# Patient Record
Sex: MALE | Race: WHITE | Employment: UNEMPLOYED | URBAN - METROPOLITAN AREA
[De-identification: names, ages, dates, MRNs, and addresses within clinical notes are randomized per-mention and may not be internally consistent; named-entity substitution may affect disease eponyms.]

---

## 2017-02-13 ENCOUNTER — HOSPITAL ENCOUNTER (EMERGENCY)
Age: 32
Discharge: STILL ADMITTED OR ADMINISTRATIVE | End: 2017-02-13
Attending: EMERGENCY MEDICINE
Payer: MEDICARE

## 2017-02-14 ENCOUNTER — HOSPITAL ENCOUNTER (EMERGENCY)
Age: 32
Discharge: HOME | End: 2017-02-14
Attending: EMERGENCY MEDICINE
Payer: MEDICARE

## 2017-02-14 ENCOUNTER — APPOINTMENT (OUTPATIENT)
Dept: GENERAL RADIOLOGY | Age: 32
End: 2017-02-14
Attending: PHYSICIAN ASSISTANT
Payer: MEDICARE

## 2017-02-14 VITALS
WEIGHT: 143 LBS | OXYGEN SATURATION: 100 % | BODY MASS INDEX: 17.78 KG/M2 | HEIGHT: 75 IN | HEART RATE: 103 BPM | TEMPERATURE: 96.3 F | SYSTOLIC BLOOD PRESSURE: 107 MMHG | RESPIRATION RATE: 16 BRPM | DIASTOLIC BLOOD PRESSURE: 76 MMHG

## 2017-02-14 DIAGNOSIS — R56.9 SEIZURE (HCC): Primary | ICD-10-CM

## 2017-02-14 LAB
ALBUMIN SERPL BCP-MCNC: 3.3 G/DL (ref 3.5–5)
ALBUMIN/GLOB SERPL: 0.7 {RATIO} (ref 1.1–2.2)
ALP SERPL-CCNC: 70 U/L (ref 45–117)
ALT SERPL-CCNC: 34 U/L (ref 12–78)
ANION GAP BLD CALC-SCNC: 12 MMOL/L (ref 5–15)
APPEARANCE UR: CLEAR
AST SERPL W P-5'-P-CCNC: 47 U/L (ref 15–37)
ATRIAL RATE: 93 BPM
BACTERIA URNS QL MICRO: NEGATIVE /HPF
BASOPHILS # BLD AUTO: 0 K/UL (ref 0–0.1)
BASOPHILS # BLD: 0 % (ref 0–1)
BILIRUB SERPL-MCNC: 0.2 MG/DL (ref 0.2–1)
BILIRUB UR QL: NEGATIVE
BUN SERPL-MCNC: 15 MG/DL (ref 6–20)
BUN/CREAT SERPL: 21 (ref 12–20)
CALCIUM SERPL-MCNC: 8.8 MG/DL (ref 8.5–10.1)
CALCULATED P AXIS, ECG09: 41 DEGREES
CALCULATED R AXIS, ECG10: 79 DEGREES
CALCULATED T AXIS, ECG11: 6 DEGREES
CHLORIDE SERPL-SCNC: 101 MMOL/L (ref 97–108)
CO2 SERPL-SCNC: 24 MMOL/L (ref 21–32)
COLOR UR: NORMAL
CREAT SERPL-MCNC: 0.7 MG/DL (ref 0.7–1.3)
DIAGNOSIS, 93000: NORMAL
EOSINOPHIL # BLD: 0.1 K/UL (ref 0–0.4)
EOSINOPHIL NFR BLD: 2 % (ref 0–7)
EPITH CASTS URNS QL MICRO: NORMAL /LPF
ERYTHROCYTE [DISTWIDTH] IN BLOOD BY AUTOMATED COUNT: 15.7 % (ref 11.5–14.5)
GLOBULIN SER CALC-MCNC: 4.9 G/DL (ref 2–4)
GLUCOSE SERPL-MCNC: 92 MG/DL (ref 65–100)
GLUCOSE UR STRIP.AUTO-MCNC: NEGATIVE MG/DL
HCT VFR BLD AUTO: 38.2 % (ref 36.6–50.3)
HGB BLD-MCNC: 12.1 G/DL (ref 12.1–17)
HGB UR QL STRIP: NEGATIVE
HYALINE CASTS URNS QL MICRO: NORMAL /LPF (ref 0–5)
KETONES UR QL STRIP.AUTO: NEGATIVE MG/DL
LEUKOCYTE ESTERASE UR QL STRIP.AUTO: NEGATIVE
LYMPHOCYTES # BLD AUTO: 25 % (ref 12–49)
LYMPHOCYTES # BLD: 1.5 K/UL (ref 0.8–3.5)
MCH RBC QN AUTO: 26.1 PG (ref 26–34)
MCHC RBC AUTO-ENTMCNC: 31.7 G/DL (ref 30–36.5)
MCV RBC AUTO: 82.5 FL (ref 80–99)
MONOCYTES # BLD: 0.6 K/UL (ref 0–1)
MONOCYTES NFR BLD AUTO: 9 % (ref 5–13)
NEUTS SEG # BLD: 3.8 K/UL (ref 1.8–8)
NEUTS SEG NFR BLD AUTO: 64 % (ref 32–75)
NITRITE UR QL STRIP.AUTO: NEGATIVE
P-R INTERVAL, ECG05: 142 MS
PH UR STRIP: 6.5 [PH] (ref 5–8)
PLATELET # BLD AUTO: 244 K/UL (ref 150–400)
POTASSIUM SERPL-SCNC: 4.4 MMOL/L (ref 3.5–5.1)
PROT SERPL-MCNC: 8.2 G/DL (ref 6.4–8.2)
PROT UR STRIP-MCNC: NEGATIVE MG/DL
Q-T INTERVAL, ECG07: 350 MS
QRS DURATION, ECG06: 102 MS
QTC CALCULATION (BEZET), ECG08: 435 MS
RBC # BLD AUTO: 4.63 M/UL (ref 4.1–5.7)
RBC #/AREA URNS HPF: NORMAL /HPF (ref 0–5)
SODIUM SERPL-SCNC: 137 MMOL/L (ref 136–145)
SP GR UR REFRACTOMETRY: 1.02 (ref 1–1.03)
UA: UC IF INDICATED,UAUC: NORMAL
UROBILINOGEN UR QL STRIP.AUTO: 0.2 EU/DL (ref 0.2–1)
VENTRICULAR RATE, ECG03: 93 BPM
WBC # BLD AUTO: 6 K/UL (ref 4.1–11.1)
WBC URNS QL MICRO: NORMAL /HPF (ref 0–4)

## 2017-02-14 PROCEDURE — 93005 ELECTROCARDIOGRAM TRACING: CPT

## 2017-02-14 PROCEDURE — 36415 COLL VENOUS BLD VENIPUNCTURE: CPT | Performed by: PHYSICIAN ASSISTANT

## 2017-02-14 PROCEDURE — 85025 COMPLETE CBC W/AUTO DIFF WBC: CPT | Performed by: PHYSICIAN ASSISTANT

## 2017-02-14 PROCEDURE — 51701 INSERT BLADDER CATHETER: CPT

## 2017-02-14 PROCEDURE — 77030011943

## 2017-02-14 PROCEDURE — 99285 EMERGENCY DEPT VISIT HI MDM: CPT

## 2017-02-14 PROCEDURE — 71010 XR CHEST PORT: CPT

## 2017-02-14 PROCEDURE — 80053 COMPREHEN METABOLIC PANEL: CPT | Performed by: PHYSICIAN ASSISTANT

## 2017-02-14 PROCEDURE — 81001 URINALYSIS AUTO W/SCOPE: CPT | Performed by: PHYSICIAN ASSISTANT

## 2017-02-14 PROCEDURE — 80339 ANTIEPILEPTICS NOS 1-3: CPT | Performed by: PHYSICIAN ASSISTANT

## 2017-02-14 RX ORDER — LORAZEPAM 2 MG/ML
1 INJECTION INTRAMUSCULAR
Status: DISCONTINUED | OUTPATIENT
Start: 2017-02-14 | End: 2017-02-14 | Stop reason: HOSPADM

## 2017-02-14 NOTE — DISCHARGE INSTRUCTIONS

## 2017-02-14 NOTE — ED NOTES
Mother states her son no longer is having the symptoms as prior to his previous seizure prior to arrival.  Provider notified. Order to not give Ativan at this time.

## 2017-02-14 NOTE — ED TRIAGE NOTES
History of TBI. History of seizures. Tonight had 4 minute seizure that is normal than typical seizure with 15 minute postictal phase. Nonverbal at baseline.

## 2017-02-14 NOTE — ED PROVIDER NOTES
HPI Comments: Patient presents by EMS after witnessed seizure. Patient with history of seizures, history of TBI and is non-verbal at baseline. Patient had normal seizure tonight that lasted approximately 4 minutes however was then post-ictal for 15 minutes which is longer then usual. Patient last seizure was 2 months ago. No change in medications. Patient is a 36 y.o. male presenting with seizures. The history is provided by the patient. Seizure    This is a recurrent problem. The problem has not changed since onset. There was 1 seizure. The most recent episode lasted 2 to 5 minutes. Pertinent negatives include no confusion, no headaches, no speech difficulty, no visual disturbance, no neck stiffness, no sore throat, no chest pain, no cough, no vomiting, no diarrhea and no muscle weakness. Characteristics include rhythmic jerking and loss of consciousness. The episode was witnessed. There was no sensation of an aura present. There was return to baseline postseizure. The seizures did not continue in the ED. The seizure(s) had no focality. Possible causes do not include medication or dosage change, sleep deprivation, missed seizure meds, recent illness, change in alcohol use, stress, head injury or missed seizure meds. There has been no fever. He reports no chest pain, no confusion, no visual disturbance, no diarrhea, no vomiting, no headaches, no sore throat, no muscle weakness, no stiff neck, no speech difficulty, and no cough. There were no medications administered prior to arrival.        Past Medical History:   Diagnosis Date    Neurological disorder      traumatic brain injury    Seizures ([address]        History reviewed. No pertinent past surgical history. History reviewed. No pertinent family history. Social History     Social History    Marital status: N/A     Spouse name: N/A    Number of children: N/A    Years of education: N/A     Occupational History    Not on file.      Social History Main Topics    Smoking status: Never Smoker    Smokeless tobacco: Not on file    Alcohol use Not on file    Drug use: No    Sexual activity: Not on file     Other Topics Concern    Not on file     Social History Narrative    No narrative on file         ALLERGIES: Review of patient's allergies indicates no known allergies. Review of Systems   Constitutional: Negative. HENT: Negative. Negative for sore throat. Eyes: Negative. Negative for visual disturbance. Respiratory: Negative. Negative for cough. Cardiovascular: Negative. Negative for chest pain. Gastrointestinal: Negative. Negative for diarrhea and vomiting. Endocrine: Negative. Genitourinary: Negative. Musculoskeletal: Negative. Skin: Negative. Allergic/Immunologic: Negative. Neurological: Positive for seizures and loss of consciousness. Negative for speech difficulty and headaches. Hematological: Negative. Psychiatric/Behavioral: Negative. Negative for confusion. All other systems reviewed and are negative. Vitals:    02/14/17 0004   BP: 113/75   Pulse: 95   Resp: 13   Temp: 96.3 °F (35.7 °C)   SpO2: 100%   Weight: 64.9 kg (143 lb)   Height: 6' 3\" (1.905 m)            Physical Exam   Constitutional: He appears well-developed and well-nourished. HENT:   Head: Normocephalic and atraumatic. Right Ear: External ear normal.   Left Ear: External ear normal.   Mouth/Throat: Oropharynx is clear and moist. No oropharyngeal exudate. Eyes: Conjunctivae and EOM are normal. Pupils are equal, round, and reactive to light. Right eye exhibits no discharge. Left eye exhibits no discharge. No scleral icterus. Neck: Normal range of motion. Neck supple. No tracheal deviation present. No thyromegaly present. Cardiovascular: Normal rate, regular rhythm, normal heart sounds and intact distal pulses. No murmur heard. Pulmonary/Chest: Effort normal and breath sounds normal. No respiratory distress. He has no wheezes. He has no rales. Abdominal: Soft. Bowel sounds are normal. He exhibits no distension. There is no tenderness. There is no rebound and no guarding. Musculoskeletal: Normal range of motion. He exhibits no edema or tenderness. Lymphadenopathy:     He has no cervical adenopathy. Neurological: He is alert. He displays atrophy. No cranial nerve deficit or sensory deficit. He exhibits abnormal muscle tone. He displays no seizure activity. Coordination normal.   Non-verbal at baseline   Skin: Skin is warm. No rash noted. No erythema. Psychiatric: He has a normal mood and affect. His behavior is normal. Judgment and thought content normal.   Nursing note and vitals reviewed. MDM  Number of Diagnoses or Management Options  Seizure [facility]):   Diagnosis management comments: Assesment/Plan- Patient returns to baseline. No acute findings in ED. Discharge with PCP follow up. Return with worsening symptoms.        Amount and/or Complexity of Data Reviewed  Tests in the radiology section of CPT®: ordered and reviewed      ED Course       Procedures
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
## 2017-02-14 NOTE — ED TRIAGE NOTES
Patient brought in after seizure tonight that was longer than normal seizures. Postictal states 15 minutes per EMS. Nonverbal at baseline but can otherwise communicate. Mother straight caths patient at night; was not incontinent of urine. Accucheck 95.

## 2017-02-15 LAB — LACOSAMIDE SERPL-MCNC: 6.8 UG/ML (ref 5–10)

## 2017-02-23 LAB
GLUCOSE BLD STRIP.AUTO-MCNC: 95 MG/DL (ref 65–100)
SERVICE CMNT-IMP: NORMAL

## 2017-02-24 LAB
GLUCOSE BLD STRIP.AUTO-MCNC: NORMAL MG/DL (ref 65–100)
SERVICE CMNT-IMP: NORMAL

## 2018-05-01 ENCOUNTER — APPOINTMENT (OUTPATIENT)
Dept: GENERAL RADIOLOGY | Age: 33
End: 2018-05-01
Attending: NURSE PRACTITIONER
Payer: MEDICARE

## 2018-05-01 ENCOUNTER — HOSPITAL ENCOUNTER (EMERGENCY)
Age: 33
Discharge: HOME | End: 2018-05-01
Attending: EMERGENCY MEDICINE
Payer: MEDICARE

## 2018-05-01 VITALS
BODY MASS INDEX: 18.03 KG/M2 | HEART RATE: 114 BPM | WEIGHT: 145 LBS | HEIGHT: 75 IN | RESPIRATION RATE: 18 BRPM | TEMPERATURE: 98.3 F | SYSTOLIC BLOOD PRESSURE: 106 MMHG | OXYGEN SATURATION: 95 % | DIASTOLIC BLOOD PRESSURE: 79 MMHG

## 2018-05-01 VITALS
OXYGEN SATURATION: 91 % | HEART RATE: 108 BPM | BODY MASS INDEX: 17.78 KG/M2 | RESPIRATION RATE: 15 BRPM | TEMPERATURE: 98.6 F | WEIGHT: 143 LBS | SYSTOLIC BLOOD PRESSURE: 117 MMHG | DIASTOLIC BLOOD PRESSURE: 66 MMHG | HEIGHT: 75 IN

## 2018-05-01 DIAGNOSIS — R56.9 SEIZURES (HCC): Primary | ICD-10-CM

## 2018-05-01 DIAGNOSIS — R56.9 SEIZURE (HCC): Primary | ICD-10-CM

## 2018-05-01 LAB
ALBUMIN SERPL-MCNC: 3.7 G/DL (ref 3.5–5)
ALBUMIN SERPL-MCNC: 3.8 G/DL (ref 3.5–5)
ALBUMIN/GLOB SERPL: 0.8 {RATIO} (ref 1.1–2.2)
ALBUMIN/GLOB SERPL: 0.8 {RATIO} (ref 1.1–2.2)
ALP SERPL-CCNC: 64 U/L (ref 45–117)
ALP SERPL-CCNC: 67 U/L (ref 45–117)
ALT SERPL-CCNC: 30 U/L (ref 12–78)
ALT SERPL-CCNC: 32 U/L (ref 12–78)
ANION GAP SERPL CALC-SCNC: 10 MMOL/L (ref 5–15)
ANION GAP SERPL CALC-SCNC: 13 MMOL/L (ref 5–15)
APPEARANCE UR: ABNORMAL
AST SERPL-CCNC: 23 U/L (ref 15–37)
AST SERPL-CCNC: 33 U/L (ref 15–37)
ATRIAL RATE: 100 BPM
BACTERIA URNS QL MICRO: NEGATIVE /HPF
BASOPHILS # BLD: 0 K/UL (ref 0–0.1)
BASOPHILS # BLD: 0 K/UL (ref 0–0.1)
BASOPHILS NFR BLD: 0 % (ref 0–1)
BASOPHILS NFR BLD: 0 % (ref 0–1)
BILIRUB SERPL-MCNC: 0.2 MG/DL (ref 0.2–1)
BILIRUB SERPL-MCNC: 0.3 MG/DL (ref 0.2–1)
BILIRUB UR QL: NEGATIVE
BUN SERPL-MCNC: 17 MG/DL (ref 6–20)
BUN SERPL-MCNC: 19 MG/DL (ref 6–20)
BUN/CREAT SERPL: 19 (ref 12–20)
BUN/CREAT SERPL: 21 (ref 12–20)
CALCIUM SERPL-MCNC: 9.2 MG/DL (ref 8.5–10.1)
CALCIUM SERPL-MCNC: 9.4 MG/DL (ref 8.5–10.1)
CALCULATED P AXIS, ECG09: 17 DEGREES
CALCULATED R AXIS, ECG10: 17 DEGREES
CALCULATED T AXIS, ECG11: 9 DEGREES
CHLORIDE SERPL-SCNC: 102 MMOL/L (ref 97–108)
CHLORIDE SERPL-SCNC: 103 MMOL/L (ref 97–108)
CO2 SERPL-SCNC: 25 MMOL/L (ref 21–32)
CO2 SERPL-SCNC: 25 MMOL/L (ref 21–32)
COLOR UR: ABNORMAL
CREAT SERPL-MCNC: 0.89 MG/DL (ref 0.7–1.3)
CREAT SERPL-MCNC: 0.9 MG/DL (ref 0.7–1.3)
DIAGNOSIS, 93000: NORMAL
DIFFERENTIAL METHOD BLD: ABNORMAL
DIFFERENTIAL METHOD BLD: ABNORMAL
EOSINOPHIL # BLD: 0.1 K/UL (ref 0–0.4)
EOSINOPHIL # BLD: 0.1 K/UL (ref 0–0.4)
EOSINOPHIL NFR BLD: 1 % (ref 0–7)
EOSINOPHIL NFR BLD: 1 % (ref 0–7)
EPITH CASTS URNS QL MICRO: ABNORMAL /LPF
ERYTHROCYTE [DISTWIDTH] IN BLOOD BY AUTOMATED COUNT: 16.9 % (ref 11.5–14.5)
ERYTHROCYTE [DISTWIDTH] IN BLOOD BY AUTOMATED COUNT: 16.9 % (ref 11.5–14.5)
GLOBULIN SER CALC-MCNC: 4.6 G/DL (ref 2–4)
GLOBULIN SER CALC-MCNC: 4.7 G/DL (ref 2–4)
GLUCOSE SERPL-MCNC: 85 MG/DL (ref 65–100)
GLUCOSE SERPL-MCNC: 97 MG/DL (ref 65–100)
GLUCOSE UR STRIP.AUTO-MCNC: NEGATIVE MG/DL
HCT VFR BLD AUTO: 42.4 % (ref 36.6–50.3)
HCT VFR BLD AUTO: 42.5 % (ref 36.6–50.3)
HGB BLD-MCNC: 13.1 G/DL (ref 12.1–17)
HGB BLD-MCNC: 13.2 G/DL (ref 12.1–17)
HGB UR QL STRIP: NEGATIVE
HYALINE CASTS URNS QL MICRO: ABNORMAL /LPF (ref 0–5)
IMM GRANULOCYTES # BLD: 0 K/UL (ref 0–0.04)
IMM GRANULOCYTES # BLD: 0 K/UL (ref 0–0.04)
IMM GRANULOCYTES NFR BLD AUTO: 0 % (ref 0–0.5)
IMM GRANULOCYTES NFR BLD AUTO: 0 % (ref 0–0.5)
KETONES UR QL STRIP.AUTO: NEGATIVE MG/DL
LACTATE SERPL-SCNC: 1.7 MMOL/L (ref 0.4–2)
LEUKOCYTE ESTERASE UR QL STRIP.AUTO: NEGATIVE
LYMPHOCYTES # BLD: 0.8 K/UL (ref 0.8–3.5)
LYMPHOCYTES # BLD: 1.3 K/UL (ref 0.8–3.5)
LYMPHOCYTES NFR BLD: 13 % (ref 12–49)
LYMPHOCYTES NFR BLD: 14 % (ref 12–49)
MCH RBC QN AUTO: 23.9 PG (ref 26–34)
MCH RBC QN AUTO: 24.1 PG (ref 26–34)
MCHC RBC AUTO-ENTMCNC: 30.9 G/DL (ref 30–36.5)
MCHC RBC AUTO-ENTMCNC: 31.1 G/DL (ref 30–36.5)
MCV RBC AUTO: 77 FL (ref 80–99)
MCV RBC AUTO: 77.9 FL (ref 80–99)
MONOCYTES # BLD: 0.6 K/UL (ref 0–1)
MONOCYTES # BLD: 1.1 K/UL (ref 0–1)
MONOCYTES NFR BLD: 10 % (ref 5–13)
MONOCYTES NFR BLD: 11 % (ref 5–13)
NEUTS SEG # BLD: 4.7 K/UL (ref 1.8–8)
NEUTS SEG # BLD: 7.1 K/UL (ref 1.8–8)
NEUTS SEG NFR BLD: 74 % (ref 32–75)
NEUTS SEG NFR BLD: 76 % (ref 32–75)
NITRITE UR QL STRIP.AUTO: NEGATIVE
NRBC # BLD: 0 K/UL (ref 0–0.01)
NRBC # BLD: 0 K/UL (ref 0–0.01)
NRBC BLD-RTO: 0 PER 100 WBC
NRBC BLD-RTO: 0 PER 100 WBC
P-R INTERVAL, ECG05: 130 MS
PH UR STRIP: 7 [PH] (ref 5–8)
PLATELET # BLD AUTO: 167 K/UL (ref 150–400)
PLATELET # BLD AUTO: ABNORMAL K/UL (ref 150–400)
PLATELET COMMENTS,PCOM: ABNORMAL
POTASSIUM SERPL-SCNC: 4.1 MMOL/L (ref 3.5–5.1)
POTASSIUM SERPL-SCNC: 4.3 MMOL/L (ref 3.5–5.1)
PROT SERPL-MCNC: 8.4 G/DL (ref 6.4–8.2)
PROT SERPL-MCNC: 8.4 G/DL (ref 6.4–8.2)
PROT UR STRIP-MCNC: NEGATIVE MG/DL
Q-T INTERVAL, ECG07: 364 MS
QRS DURATION, ECG06: 100 MS
QTC CALCULATION (BEZET), ECG08: 469 MS
RBC # BLD AUTO: 5.44 M/UL (ref 4.1–5.7)
RBC # BLD AUTO: 5.52 M/UL (ref 4.1–5.7)
RBC #/AREA URNS HPF: ABNORMAL /HPF (ref 0–5)
RBC MORPH BLD: ABNORMAL
SODIUM SERPL-SCNC: 138 MMOL/L (ref 136–145)
SODIUM SERPL-SCNC: 140 MMOL/L (ref 136–145)
SP GR UR REFRACTOMETRY: 1.02 (ref 1–1.03)
UR CULT HOLD, URHOLD: NORMAL
UROBILINOGEN UR QL STRIP.AUTO: 0.2 EU/DL (ref 0.2–1)
VENTRICULAR RATE, ECG03: 100 BPM
WBC # BLD AUTO: 6.2 K/UL (ref 4.1–11.1)
WBC # BLD AUTO: 9.6 K/UL (ref 4.1–11.1)
WBC URNS QL MICRO: ABNORMAL /HPF (ref 0–4)

## 2018-05-01 PROCEDURE — 99285 EMERGENCY DEPT VISIT HI MDM: CPT

## 2018-05-01 PROCEDURE — 51701 INSERT BLADDER CATHETER: CPT

## 2018-05-01 PROCEDURE — 81001 URINALYSIS AUTO W/SCOPE: CPT | Performed by: NURSE PRACTITIONER

## 2018-05-01 PROCEDURE — 93005 ELECTROCARDIOGRAM TRACING: CPT

## 2018-05-01 PROCEDURE — 80339 ANTIEPILEPTICS NOS 1-3: CPT | Performed by: PHYSICIAN ASSISTANT

## 2018-05-01 PROCEDURE — 36415 COLL VENOUS BLD VENIPUNCTURE: CPT | Performed by: NURSE PRACTITIONER

## 2018-05-01 PROCEDURE — 74011250637 HC RX REV CODE- 250/637: Performed by: PHYSICIAN ASSISTANT

## 2018-05-01 PROCEDURE — 77030005563 HC CATH URETH INT MMGH -A

## 2018-05-01 PROCEDURE — 74011250637 HC RX REV CODE- 250/637: Performed by: NURSE PRACTITIONER

## 2018-05-01 PROCEDURE — 80339 ANTIEPILEPTICS NOS 1-3: CPT | Performed by: NURSE PRACTITIONER

## 2018-05-01 PROCEDURE — 71045 X-RAY EXAM CHEST 1 VIEW: CPT

## 2018-05-01 PROCEDURE — 80053 COMPREHEN METABOLIC PANEL: CPT | Performed by: NURSE PRACTITIONER

## 2018-05-01 PROCEDURE — 80053 COMPREHEN METABOLIC PANEL: CPT | Performed by: PHYSICIAN ASSISTANT

## 2018-05-01 PROCEDURE — 85025 COMPLETE CBC W/AUTO DIFF WBC: CPT | Performed by: NURSE PRACTITIONER

## 2018-05-01 PROCEDURE — 83605 ASSAY OF LACTIC ACID: CPT | Performed by: PHYSICIAN ASSISTANT

## 2018-05-01 RX ORDER — DIAZEPAM 10 MG/2ML
5 GEL RECTAL AS NEEDED
Qty: 1 EACH | Refills: 0 | Status: SHIPPED | OUTPATIENT
Start: 2018-05-01 | End: 2019-07-25

## 2018-05-01 RX ORDER — IBUPROFEN 600 MG/1
600 TABLET ORAL
Status: COMPLETED | OUTPATIENT
Start: 2018-05-01 | End: 2018-05-01

## 2018-05-01 RX ORDER — LACOSAMIDE 100 MG/1
100 TABLET ORAL
Status: COMPLETED | OUTPATIENT
Start: 2018-05-01 | End: 2018-05-01

## 2018-05-01 RX ORDER — LACOSAMIDE 100 MG/1
100 TABLET ORAL 2 TIMES DAILY
Qty: 30 TAB | Refills: 0 | Status: SHIPPED | OUTPATIENT
Start: 2018-05-01 | End: 2019-08-23

## 2018-05-01 RX ORDER — LORAZEPAM 2 MG/ML
1 INJECTION INTRAMUSCULAR
Status: DISCONTINUED | OUTPATIENT
Start: 2018-05-01 | End: 2018-05-01

## 2018-05-01 RX ADMIN — LACOSAMIDE 100 MG: 100 TABLET, FILM COATED ORAL at 11:31

## 2018-05-01 RX ADMIN — IBUPROFEN 600 MG: 600 TABLET ORAL at 11:03

## 2018-05-01 RX ADMIN — LACOSAMIDE 100 MG: 100 TABLET, FILM COATED ORAL at 23:20

## 2018-05-01 NOTE — DISCHARGE INSTRUCTIONS
Seizure: Care Instructions  Your Care Instructions    Seizures are caused by abnormal patterns of electrical signals in the brain. They are different for each person. Seizures can affect movement, speech, vision, or awareness. Some people have only slight shaking of a hand and do not pass out. Other people may pass out and have violent shaking of the whole body. Some people appear to stare into space. They are awake, but they can't respond normally. Later, they may not remember what happened. You may need tests to identify the type and cause of the seizures. A seizure may occur only once, or you may have them more than one time. Taking medicines as directed and following up with your doctor may help keep you from having more seizures. The doctor has checked you carefully, but problems can develop later. If you notice any problems or new symptoms, get medical treatment right away. Follow-up care is a key part of your treatment and safety. Be sure to make and go to all appointments, and call your doctor if you are having problems. It's also a good idea to know your test results and keep a list of the medicines you take. How can you care for yourself at home? · Be safe with medicines. Take your medicines exactly as prescribed. Call your doctor if you think you are having a problem with your medicine. · Do not do any activity that could be dangerous to you or others until your doctor says it is safe to do so. For example, do not drive a car, operate machinery, swim, or climb ladders. · Be sure that anyone treating you for any health problem knows that you have had a seizure and what medicines you are taking for it. · Identify and avoid things that may make you more likely to have a seizure. These may include lack of sleep, alcohol or drug use, stress, or not eating. · Make sure you go to your follow-up appointment. When should you call for help? Call 911 anytime you think you may need emergency care.  For example, call if:  ? · You have another seizure. ? · You have more than one seizure in 24 hours. ? · You have new symptoms, such as trouble walking, speaking, or thinking clearly. ?Call your doctor now or seek immediate medical care if:  ? · You are not acting normally. ? Watch closely for changes in your health, and be sure to contact your doctor if you have any problems. Where can you learn more? Go to http://mohan-steven.info/. Enter A453 in the search box to learn more about \"Seizure: Care Instructions. \"  Current as of: October 14, 2016  Content Version: 11.4  © 4946-3572 [employer]. Care instructions adapted under license by [employer] (which disclaims liability or warranty for this information). If you have questions about a medical condition or this instruction, always ask your healthcare professional. [address] any warranty or liability for your use of this information. We hope that we have addressed all of your medical concerns. The examination and treatment you received in the Emergency Department were for an emergent problem and were not intended as complete care. It is important that you follow up with your healthcare provider(s) for ongoing care. If your symptoms worsen or do not improve as expected, and you are unable to reach your usual health care provider(s), you should return to the Emergency Department. Today's healthcare is undergoing tremendous change, and patient satisfaction surveys are one of the many tools to assess the quality of medical care. You may receive a survey from the [employer] [facility] organization regarding your experience in the Emergency Department. I hope that your experience has been completely positive, particularly the medical care that I provided. As such, please participate in the survey; anything less than excellent does not meet my expectations or intentions.         [facility], [facility] and [employer] participate in nationally recognized quality of care measures. If your blood pressure is greater than 120/80, as reported below, we urge that you seek medical care to address the potential of high blood pressure, commonly known as hypertension. Hypertension can be hereditary or can be caused by certain medical conditions, pain, stress, or \"white coat syndrome. \"       Please make an appointment with your health care provider(s) for follow up of your Emergency Department visit. VITALS:   Patient Vitals for the past 8 hrs:   Temp Pulse Resp BP SpO2   05/01/18 1100 - 91 16 (!) 79/62 97 %   05/01/18 1030 - 94 18 100/81 96 %   05/01/18 1015 - 90 17 106/79 96 %   05/01/18 1000 - 99 15 134/80 96 %   05/01/18 0949 - - - - 94 %   05/01/18 0930 - 100 17 112/86 96 %   05/01/18 0915 - (!) 104 19 113/90 93 %   05/01/18 0904 98.6 °F (37 °C) (!) 110 18 123/90 93 %          Thank you for allowing us to provide you with medical care today. We realize that you have many choices for your emergency care needs. Please choose us in the future for any continued health care needs. Manisha Andres, NP    [address] [facility].   Office: [phone]            Recent Results (from the past 24 hour(s))   CBC WITH AUTOMATED DIFF    Collection Time: 05/01/18  9:20 AM   Result Value Ref Range    WBC 6.2 4.1 - 11.1 K/uL    RBC 5.44 4.10 - 5.70 M/uL    HGB 13.1 12.1 - 17.0 g/dL    HCT 42.4 36.6 - 50.3 %    MCV 77.9 (L) 80.0 - 99.0 FL    MCH 24.1 (L) 26.0 - 34.0 PG    MCHC 30.9 30.0 - 36.5 g/dL    RDW 16.9 (H) 11.5 - 14.5 %    PLATELET  402 - 146 K/uL     UNABLE TO REPORT ACCURATE COUNT DUE TO PLATELET AGGREGATION, HOWEVER, PLATELETS APPEAR NORMAL IN NUMBER ON SMEAR. PLEASE RESUBMIT SODIUM CITRATE (BLUE) AND EDTA (LAVENDER) TUBES FOR HEMATOLOGICAL TESTING.     NRBC 0.0 0  WBC    ABSOLUTE NRBC 0.00 0.00 - 0.01 K/uL    NEUTROPHILS 76 (H) 32 - 75 %    LYMPHOCYTES 13 12 - 49 %    MONOCYTES 10 5 - 13 %    EOSINOPHILS 1 0 - 7 %    BASOPHILS 0 0 - 1 %    IMMATURE GRANULOCYTES 0 0.0 - 0.5 %    ABS. NEUTROPHILS 4.7 1.8 - 8.0 K/UL    ABS. LYMPHOCYTES 0.8 0.8 - 3.5 K/UL    ABS. MONOCYTES 0.6 0.0 - 1.0 K/UL    ABS. EOSINOPHILS 0.1 0.0 - 0.4 K/UL    ABS. BASOPHILS 0.0 0.0 - 0.1 K/UL    ABS. IMM. GRANS. 0.0 0.00 - 0.04 K/UL    DF SMEAR SCANNED      RBC COMMENTS ANISOCYTOSIS  1+        RBC COMMENTS OVALOCYTES  PRESENT        RBC COMMENTS MICROCYTOSIS  1+       METABOLIC PANEL, COMPREHENSIVE    Collection Time: 05/01/18  9:20 AM   Result Value Ref Range    Sodium 138 136 - 145 mmol/L    Potassium 4.3 3.5 - 5.1 mmol/L    Chloride 103 97 - 108 mmol/L    CO2 25 21 - 32 mmol/L    Anion gap 10 5 - 15 mmol/L    Glucose 85 65 - 100 mg/dL    BUN 19 6 - 20 MG/DL    Creatinine 0.89 0.70 - 1.30 MG/DL    BUN/Creatinine ratio 21 (H) 12 - 20      GFR est AA >60 >60 ml/min/1.73m2    GFR est non-AA >60 >60 ml/min/1.73m2    Calcium 9.4 8.5 - 10.1 MG/DL    Bilirubin, total 0.2 0.2 - 1.0 MG/DL    ALT (SGPT) 30 12 - 78 U/L    AST (SGOT) 23 15 - 37 U/L    Alk.  phosphatase 64 45 - 117 U/L    Protein, total 8.4 (H) 6.4 - 8.2 g/dL    Albumin 3.7 3.5 - 5.0 g/dL    Globulin 4.7 (H) 2.0 - 4.0 g/dL    A-G Ratio 0.8 (L) 1.1 - 2.2     URINALYSIS W/MICROSCOPIC    Collection Time: 05/01/18  9:20 AM   Result Value Ref Range    Color YELLOW/STRAW      Appearance CLOUDY (A) CLEAR      Specific gravity 1.021 1.003 - 1.030      pH (UA) 7.0 5.0 - 8.0      Protein NEGATIVE  NEG mg/dL    Glucose NEGATIVE  NEG mg/dL    Ketone NEGATIVE  NEG mg/dL    Bilirubin NEGATIVE  NEG      Blood NEGATIVE  NEG      Urobilinogen 0.2 0.2 - 1.0 EU/dL    Nitrites NEGATIVE  NEG      Leukocyte Esterase NEGATIVE  NEG      WBC 0-4 0 - 4 /hpf    RBC 0-5 0 - 5 /hpf    Epithelial cells FEW FEW /lpf    Bacteria NEGATIVE  NEG /hpf    Hyaline cast 0-2 0 - 5 /lpf   URINE CULTURE HOLD SAMPLE    Collection Time: 05/01/18  9:20 AM Result Value Ref Range    Urine culture hold        URINE ON HOLD IN MICROBIOLOGY DEPT FOR 3 DAYS. IF UNPRESERVED URINE IS SUBMITTED, IT CANNOT BE USED FOR ADDITIONAL TESTING AFTER 24 HRS, RECOLLECTION WILL BE REQUIRED. Xr Chest Port    Result Date: 5/1/2018  Indication: Chest pain, seizures this morning, follow-up pleural effusion Comparison: 2/14/2017 Portable exam of the chest obtained at 926 demonstrates normal heart size. There is no change in the moderate right pleural effusion with underlying consolidation. The osseous structures are unremarkable. Shunt tubing is unchanged in position. Impression: Persistent moderate right pleural effusion with underlying consolidation.

## 2018-05-01 NOTE — ED PROVIDER NOTES
HPI Comments: 37-year-old male patient who presents to the emergency room via [facility] fire and EMS with a chief complaint of seizures. Per the patient's mother, who arrived with him, patient was been taking Vimpat for several years now she believes that the caregivers who have been taking care of her son have been giving her son the Vimpat as needed because they have run out. The mother states that the patient had 2 seizures this morning lasting in total for approximately 18 minutes. The mother also states that last night the vomited and he may have aspirated. The patient is nonverbal at baseline and cannot contribute to his review of systems or his history. Terri Khan MD    Past Medical History:  No date: Neurological disorder      Comment: traumatic brain injury  No date: Seizures [facility])      The history is provided by the patient. No  was used. Past Medical History:   Diagnosis Date    Neurological disorder     traumatic brain injury    Seizures ([address]        History reviewed. No pertinent surgical history. History reviewed. No pertinent family history. Social History     Social History    Marital status: SINGLE     Spouse name: N/A    Number of children: N/A    Years of education: N/A     Occupational History    Not on file. Social History Main Topics    Smoking status: Never Smoker    Smokeless tobacco: Never Used    Alcohol use Not on file    Drug use: No    Sexual activity: Not on file     Other Topics Concern    Not on file     Social History Narrative         ALLERGIES: Review of patient's allergies indicates no known allergies.     Review of Systems   Unable to perform ROS: Patient nonverbal       Vitals:    05/01/18 1000 05/01/18 1015 05/01/18 1030 05/01/18 1100   BP: 134/80 106/79 100/81 (!) 79/62   Pulse: 99 90 94 91   Resp: 15 17 18 16   Temp:       SpO2: 96% 96% 96% 97%   Weight:       Height:                Physical Exam   Constitutional: He is oriented to person, place, and time. Vital signs are normal. He appears well-developed and well-nourished. Non-toxic appearance. He does not have a sickly appearance. He does not appear ill. HENT:   Head: Normocephalic and atraumatic. Eyes: Conjunctivae, EOM and lids are normal. Pupils are equal, round, and reactive to light. Neck: Trachea normal, normal range of motion and full passive range of motion without pain. Neck supple. Cardiovascular: Normal rate, regular rhythm, normal heart sounds and normal pulses. Pulmonary/Chest: Effort normal. He has rhonchi in the right lower field and the left lower field. Old trach noted   Abdominal: Soft. Normal appearance and bowel sounds are normal.   Musculoskeletal: Normal range of motion. Neurological: He is alert and oriented to person, place, and time. He has normal strength. He displays atrophy. He displays no tremor. He exhibits abnormal muscle tone. He displays no seizure activity. GCS eye subscore is 4. GCS verbal subscore is 5. GCS motor subscore is 6. Pt non verbal.    Contracted left hand   Skin: Skin is warm, dry and intact. Psychiatric: He has a normal mood and affect. His speech is normal and behavior is normal. Judgment and thought content normal. Cognition and memory are normal.   Nursing note and vitals reviewed.        MDM  Number of Diagnoses or Management Options  Seizures ([facility] [address]): new and requires workup     Amount and/or Complexity of Data Reviewed  Clinical lab tests: ordered  Tests in the radiology section of CPT®: ordered  Discuss the patient with other providers: yes Dennis Quiles    Risk of Complications, Morbidity, and/or Mortality  Presenting problems: moderate  Diagnostic procedures: moderate  Management options: low    Patient Progress  Patient progress: improved        ED Course       Procedures    LABORATORY TESTS:  Recent Results (from the past 12 hour(s))   CBC WITH AUTOMATED DIFF    Collection Time: 05/01/18  9:20 AM   Result Value Ref Range    WBC 6.2 4.1 - 11.1 K/uL    RBC 5.44 4.10 - 5.70 M/uL    HGB 13.1 12.1 - 17.0 g/dL    HCT 42.4 36.6 - 50.3 %    MCV 77.9 (L) 80.0 - 99.0 FL    MCH 24.1 (L) 26.0 - 34.0 PG    MCHC 30.9 30.0 - 36.5 g/dL    RDW 16.9 (H) 11.5 - 14.5 %    PLATELET  652 - 957 K/uL     UNABLE TO REPORT ACCURATE COUNT DUE TO PLATELET AGGREGATION, HOWEVER, PLATELETS APPEAR NORMAL IN NUMBER ON SMEAR. PLEASE RESUBMIT SODIUM CITRATE (BLUE) AND EDTA (LAVENDER) TUBES FOR HEMATOLOGICAL TESTING. NRBC 0.0 0  WBC    ABSOLUTE NRBC 0.00 0.00 - 0.01 K/uL    NEUTROPHILS 76 (H) 32 - 75 %    LYMPHOCYTES 13 12 - 49 %    MONOCYTES 10 5 - 13 %    EOSINOPHILS 1 0 - 7 %    BASOPHILS 0 0 - 1 %    IMMATURE GRANULOCYTES 0 0.0 - 0.5 %    ABS. NEUTROPHILS 4.7 1.8 - 8.0 K/UL    ABS. LYMPHOCYTES 0.8 0.8 - 3.5 K/UL    ABS. MONOCYTES 0.6 0.0 - 1.0 K/UL    ABS. EOSINOPHILS 0.1 0.0 - 0.4 K/UL    ABS. BASOPHILS 0.0 0.0 - 0.1 K/UL    ABS. IMM. GRANS. 0.0 0.00 - 0.04 K/UL    DF SMEAR SCANNED      RBC COMMENTS ANISOCYTOSIS  1+        RBC COMMENTS OVALOCYTES  PRESENT        RBC COMMENTS MICROCYTOSIS  1+       METABOLIC PANEL, COMPREHENSIVE    Collection Time: 05/01/18  9:20 AM   Result Value Ref Range    Sodium 138 136 - 145 mmol/L    Potassium 4.3 3.5 - 5.1 mmol/L    Chloride 103 97 - 108 mmol/L    CO2 25 21 - 32 mmol/L    Anion gap 10 5 - 15 mmol/L    Glucose 85 65 - 100 mg/dL    BUN 19 6 - 20 MG/DL    Creatinine 0.89 0.70 - 1.30 MG/DL    BUN/Creatinine ratio 21 (H) 12 - 20      GFR est AA >60 >60 ml/min/1.73m2    GFR est non-AA >60 >60 ml/min/1.73m2    Calcium 9.4 8.5 - 10.1 MG/DL    Bilirubin, total 0.2 0.2 - 1.0 MG/DL    ALT (SGPT) 30 12 - 78 U/L    AST (SGOT) 23 15 - 37 U/L    Alk.  phosphatase 64 45 - 117 U/L    Protein, total 8.4 (H) 6.4 - 8.2 g/dL    Albumin 3.7 3.5 - 5.0 g/dL    Globulin 4.7 (H) 2.0 - 4.0 g/dL    A-G Ratio 0.8 (L) 1.1 - 2.2     URINALYSIS W/MICROSCOPIC    Collection Time: 05/01/18  9:20 AM   Result Value Ref Range    Color YELLOW/STRAW      Appearance CLOUDY (A) CLEAR      Specific gravity 1.021 1.003 - 1.030      pH (UA) 7.0 5.0 - 8.0      Protein NEGATIVE  NEG mg/dL    Glucose NEGATIVE  NEG mg/dL    Ketone NEGATIVE  NEG mg/dL    Bilirubin NEGATIVE  NEG      Blood NEGATIVE  NEG      Urobilinogen 0.2 0.2 - 1.0 EU/dL    Nitrites NEGATIVE  NEG      Leukocyte Esterase NEGATIVE  NEG      WBC 0-4 0 - 4 /hpf    RBC 0-5 0 - 5 /hpf    Epithelial cells FEW FEW /lpf    Bacteria NEGATIVE  NEG /hpf    Hyaline cast 0-2 0 - 5 /lpf   URINE CULTURE HOLD SAMPLE    Collection Time: 05/01/18  9:20 AM   Result Value Ref Range    Urine culture hold        URINE ON HOLD IN MICROBIOLOGY DEPT FOR 3 DAYS. IF UNPRESERVED URINE IS SUBMITTED, IT CANNOT BE USED FOR ADDITIONAL TESTING AFTER 24 HRS, RECOLLECTION WILL BE REQUIRED. EKG, 12 LEAD, INITIAL    Collection Time: 05/01/18  9:30 AM   Result Value Ref Range    Ventricular Rate 100 BPM    Atrial Rate 100 BPM    P-R Interval 130 ms    QRS Duration 100 ms    Q-T Interval 364 ms    QTC Calculation (Bezet) 469 ms    Calculated P Axis 17 degrees    Calculated R Axis 17 degrees    Calculated T Axis 9 degrees    Diagnosis       Normal sinus rhythm  Incomplete right bundle branch block  Possible Inferior infarct , age undetermined  T wave abnormality, consider anterior ischemia  Abnormal ECG  When compared with ECG of 13-FEB-2017 23:53,  Questionable change in QRS axis         IMAGING RESULTS:    CT Results  (Last 48 hours)    None        PFT Results  (Last 48 hours)    None        Echo Results  (Last 48 hours)    None        CXR Results  (Last 48 hours)               05/01/18 0928  XR CHEST PORT Final result    Impression:  Impression: Persistent moderate right pleural effusion with underlying   consolidation. Narrative: Indication: Chest pain, seizures this morning, follow-up pleural effusion       Comparison: 2/14/2017       Portable exam of the chest obtained at 926 demonstrates normal heart size. There   is no change in the moderate right pleural effusion with underlying   consolidation. The osseous structures are unremarkable. Shunt tubing is   unchanged in position. VENOUS DOPPLER results  (Last 48 hours)    None            MEDICATIONS GIVEN:  Medications   ibuprofen (MOTRIN) tablet 600 mg (600 mg Oral Given 5/1/18 1103)   lacosamide (VIMPAT) tablet 100 mg (100 mg Oral Given 5/1/18 1131)       IMPRESSION:  1. Seizures ([address]        PLAN:  1. Vimpat  2. F/U with PCP. Call Neurologist re: Merlyn Miles today. Return to ED if worse    Discharge Note  11:19 AM  The patient is ready for discharge. The patient's signs, symptoms, diagnosis, and discharge instructions have been discussed and the patient has conveyed their understanding. The patient is to follow up as recommended or return to the ER should their symptoms worsen. Plan has been discussed and the patient is in agreement. Bunny Fischer Pagé FNP-BC.

## 2018-05-01 NOTE — ED NOTES
The patient is awake and alert moving his hand and verbalizing normally per mother. Tolerated PO medication with applesauce without difficulty.

## 2018-05-01 NOTE — ED NOTES
The patient was abl to minimally assist with transition to the [facility]. The family states the patient is better than baseline referring to cognitive status.

## 2018-05-01 NOTE — ED NOTES
The family was given complete discharge instructions and the patient was medicated as directed. The IV lock was removed and the patient was discharged via [facility] to [facility].

## 2018-05-01 NOTE — ED TRIAGE NOTES
The mother states the patient experienced 2 seizures this morning lasting a total of 18 minutes. The patient is not having seizure activity at this time and can nod his head in response to questions.

## 2018-05-01 NOTE — ED NOTES
ED EKG interpretation:  Rhythm: normal sinus rhythm; and regular . Rate (approx.): 100;  Axis: normal; P wave: normal; QRS interval: normal ; ST/T wave: non-specific changes; incomplete RBBB, prolonged QTc, no significant changes from EKG 2/13/2017  EKG documented by Steven Beasley MD

## 2018-05-02 LAB
ATRIAL RATE: 124 BPM
CALCULATED P AXIS, ECG09: 24 DEGREES
CALCULATED R AXIS, ECG10: 169 DEGREES
CALCULATED T AXIS, ECG11: 18 DEGREES
DIAGNOSIS, 93000: NORMAL
LACOSAMIDE SERPL-MCNC: 3 UG/ML (ref 5–10)
P-R INTERVAL, ECG05: 136 MS
Q-T INTERVAL, ECG07: 324 MS
QRS DURATION, ECG06: 102 MS
QTC CALCULATION (BEZET), ECG08: 465 MS
VENTRICULAR RATE, ECG03: 124 BPM
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
## 2018-05-02 NOTE — ED NOTES
TIFFANIE James gave and reviewed discharge instructions with the patient and caregiver. The patient and caregiver verbalized understanding. The patient and caregiver were given opportunity for questions. Patient discharged in stable condition to the waiting room via wheelchair with RN, ED tech and Mother and Father.

## 2018-05-02 NOTE — ED NOTES
Pt's mother reports pt is at his baseline. Seizure precautions in place. Pt on cardiac monitor x 3. Call bell within reach. Bed in lowest position. Bed locked. Side rails up x 2. Pt's parents at bedside with him.

## 2018-05-02 NOTE — ED PROVIDER NOTES
HPI Comments: Agustin Nelson is a 34 y.o. male  who presents by EMS to ER with c/o Patient presents with:  Seizure  Patient with history of seizures from TBI. Patient has been on vimpat and mother believes patient missed multiple doses this weekend. PAtient had 3 seizures today. Seen in ED this morning for same, tonight patient had 2 more seizures. Patients mother reports his oxygen saturation went down to the low 80s during episode. Patient is non-verbal and returned to baseline. He specifically denies any fevers, chills, nausea, vomiting, chest pain, shortness of breath, headache, rash, diarrhea, abdominal pain, urinary/bowel changes, sweating or weight loss. PCP: Idania Cartwright MD   PMHx significant for: Past Medical History:  No date: Neurological disorder      Comment: traumatic brain injury  No date: Seizures [facility])   PSHx significant for: No past surgical history on file. Social Hx: Tobacco use: Smoking status: Never Smoker                                                              Smokeless status: Never Used                      ; EtOH use: The patient states he drinks 0 per week.; Illicit Drug use: Allergies:  No Known Allergies    There are no other complaints, changes or physical findings at this time. Patient is a 34 y.o. male presenting with seizures. The history is provided by the EMS personnel and a parent. Seizure    This is a new problem. The problem has been resolved. There were 2 - 3 seizures. The most recent episode lasted more than 5 minutes. Pertinent negatives include no confusion, no headaches, no speech difficulty, no visual disturbance, no neck stiffness, no sore throat, no chest pain, no cough, no vomiting, no diarrhea and no muscle weakness. Characteristics include rhythmic jerking, loss of consciousness and apnea. The episode was witnessed. There was return to baseline postseizure. The seizures did not continue in the ED. The seizure(s) had no focality.  Possible causes include missed seizure meds and missed seizure meds. There has been no fever. He reports no chest pain, no confusion, no visual disturbance, no diarrhea, no vomiting, no headaches, no sore throat, no muscle weakness, no stiff neck, no speech difficulty, and no cough. There were no medications administered prior to arrival. Home seizure meds: vimpat. Past Medical History:   Diagnosis Date    Neurological disorder     traumatic brain injury    Seizures ([facility] [address]        No past surgical history on file. No family history on file. Social History     Social History    Marital status: SINGLE     Spouse name: N/A    Number of children: N/A    Years of education: N/A     Occupational History    Not on file. Social History Main Topics    Smoking status: Never Smoker    Smokeless tobacco: Never Used    Alcohol use Not on file    Drug use: No    Sexual activity: Not on file     Other Topics Concern    Not on file     Social History Narrative         ALLERGIES: Review of patient's allergies indicates no known allergies. Review of Systems   Constitutional: Negative. HENT: Negative. Negative for sore throat. Eyes: Negative. Negative for visual disturbance. Respiratory: Positive for apnea. Negative for cough. Cardiovascular: Negative. Negative for chest pain. Gastrointestinal: Negative. Negative for diarrhea and vomiting. Endocrine: Negative. Genitourinary: Negative. Musculoskeletal: Negative. Skin: Negative. Allergic/Immunologic: Negative. Neurological: Positive for seizures and loss of consciousness. Negative for speech difficulty and headaches. Hematological: Negative. Psychiatric/Behavioral: Negative. Negative for confusion. All other systems reviewed and are negative.       Vitals:    05/01/18 2057 05/01/18 2100 05/01/18 2105   BP: 114/84 115/82    Pulse: (!) 126 (!) 123 (!) 121   Resp: 17 19 18   Temp: 98.3 °F (36.8 °C)     SpO2: 93% 93% 92% Weight: 65.8 kg (145 lb)     Height: 6' 3\" (1.905 m)              Physical Exam   Constitutional: He appears well-developed and well-nourished. Non-toxic appearance. He does not have a sickly appearance. He does not appear ill. No distress. HENT:   Head: Normocephalic and atraumatic. Right Ear: External ear normal.   Left Ear: External ear normal.   Mouth/Throat: Oropharynx is clear and moist. No oropharyngeal exudate. Eyes: Conjunctivae and EOM are normal. Pupils are equal, round, and reactive to light. Right eye exhibits no discharge. Left eye exhibits no discharge. No scleral icterus. Neck: Normal range of motion. Neck supple. No tracheal deviation present. No thyromegaly present. Cardiovascular: Regular rhythm, normal heart sounds and intact distal pulses. Tachycardia present. No murmur heard. Pulmonary/Chest: Effort normal and breath sounds normal. No respiratory distress. He has no wheezes. He has no rales. Abdominal: Soft. Bowel sounds are normal. He exhibits no distension. There is no tenderness. There is no rebound and no guarding. Musculoskeletal: Normal range of motion. He exhibits no edema or tenderness. Lymphadenopathy:     He has no cervical adenopathy. Neurological: He is alert. He has normal strength. He displays atrophy. No cranial nerve deficit or sensory deficit. Coordination normal. GCS eye subscore is 4. GCS verbal subscore is 1. GCS motor subscore is 6. Skin: Skin is warm. No rash noted. No erythema. Psychiatric: He has a normal mood and affect. His behavior is normal. Judgment and thought content normal.   Nursing note and vitals reviewed. MDM  Number of Diagnoses or Management Options  Seizure [facility]):   Diagnosis management comments: Assesment/Plan- 34 y.o. Patient presents with:  Seizure  differential includes: seizure, infection, medication problem. Labs and imaging reviewed with no acute findings. vimpat level pending.  Patient well appearing, returned to baseline. Patient monitored in ED with no seizure like activity. Will discharge home with distat. Recommend neurology follow up. Patient educated on reasons to return to the ED.          Amount and/or Complexity of Data Reviewed  Clinical lab tests: reviewed and ordered  Tests in the medicine section of CPT®: ordered and reviewed  Discuss the patient with other providers: yes (Attending- Dr. Gardner Doctor who agrees with plan)          ED Course       Procedures

## 2018-05-02 NOTE — ED NOTES
MD at pt's bedside. Hourly rounds completed. Concerns and questions addressed at this time. Pt in no acute distress at this time. Call bell within reach. Side rails x 2. Cardiac Monitor x 3. Stretcher locked in lowest position.

## 2018-05-02 NOTE — DISCHARGE INSTRUCTIONS
Seizure: Care Instructions  Your Care Instructions    Seizures are caused by abnormal patterns of electrical signals in the brain. They are different for each person. Seizures can affect movement, speech, vision, or awareness. Some people have only slight shaking of a hand and do not pass out. Other people may pass out and have violent shaking of the whole body. Some people appear to stare into space. They are awake, but they can't respond normally. Later, they may not remember what happened. You may need tests to identify the type and cause of the seizures. A seizure may occur only once, or you may have them more than one time. Taking medicines as directed and following up with your doctor may help keep you from having more seizures. The doctor has checked you carefully, but problems can develop later. If you notice any problems or new symptoms, get medical treatment right away. Follow-up care is a key part of your treatment and safety. Be sure to make and go to all appointments, and call your doctor if you are having problems. It's also a good idea to know your test results and keep a list of the medicines you take. How can you care for yourself at home? · Be safe with medicines. Take your medicines exactly as prescribed. Call your doctor if you think you are having a problem with your medicine. · Do not do any activity that could be dangerous to you or others until your doctor says it is safe to do so. For example, do not drive a car, operate machinery, swim, or climb ladders. · Be sure that anyone treating you for any health problem knows that you have had a seizure and what medicines you are taking for it. · Identify and avoid things that may make you more likely to have a seizure. These may include lack of sleep, alcohol or drug use, stress, or not eating. · Make sure you go to your follow-up appointment. When should you call for help? Call 911 anytime you think you may need emergency care. For example, call if:  ? · You have another seizure. ? · You have more than one seizure in 24 hours. ? · You have new symptoms, such as trouble walking, speaking, or thinking clearly. ?Call your doctor now or seek immediate medical care if:  ? · You are not acting normally. ? Watch closely for changes in your health, and be sure to contact your doctor if you have any problems. Where can you learn more? Go to http://mohan-steven.info/. Enter Q597 in the search box to learn more about \"Seizure: Care Instructions. \"  Current as of: October 14, 2016  Content Version: 11.4  © 9652-1134 [id number]. Care instructions adapted under license by [employer] (which disclaims liability or warranty for this information). If you have questions about a medical condition or this instruction, always ask your healthcare professional. [place] [address] any warranty or liability for your use of this information. We hope that we have addressed all of your medical concerns. The examination and treatment you received in the Emergency Department were for an emergent problem and were not intended as complete care. It is important that you follow up with your healthcare provider(s) for ongoing care. If your symptoms worsen or do not improve as expected, and you are unable to reach your usual health care provider(s), you should return to the Emergency Department. Today's healthcare is undergoing tremendous change, and patient satisfaction surveys are one of the many tools to assess the quality of medical care. You may receive a survey from the [employer] organization regarding your experience in the Emergency Department. I hope that your experience has been completely positive, particularly the medical care that I provided. As such, please participate in the survey; anything less than excellent does not meet my expectations or intentions.         [facility] and [facility]orton participate in nationally recognized quality of care measures. If your blood pressure is greater than 120/80, as reported below, we urge that you seek medical care to address the potential of high blood pressure, commonly known as hypertension. Hypertension can be hereditary or can be caused by certain medical conditions, pain, stress, or \"white coat syndrome. \"       Please make an appointment with your health care provider(s) for follow up of your Emergency Department visit. VITALS:   Patient Vitals for the past 8 hrs:   Temp Pulse Resp BP SpO2   05/01/18 2315 - (!) 114 18 106/79 95 %   05/01/18 2300 - (!) 114 17 103/79 94 %   05/01/18 2259 - (!) 115 18 - 95 %   05/01/18 2258 - (!) 118 17 - 94 %   05/01/18 2245 - (!) 115 16 105/76 95 %   05/01/18 2236 - (!) 119 14 107/79 97 %   05/01/18 2215 - (!) 121 19 110/83 98 %   05/01/18 2201 - (!) 118 19 116/84 96 %   05/01/18 2146 - (!) 119 19 - 95 %   05/01/18 2145 - - - 115/79 96 %   05/01/18 2134 - (!) 116 19 - 95 %   05/01/18 2130 - - - 112/85 96 %   05/01/18 2129 - (!) 116 18 - 95 %   05/01/18 2115 - (!) 119 18 115/77 93 %   05/01/18 2105 - (!) 121 18 - 92 %   05/01/18 2100 - (!) 123 19 115/82 93 %   05/01/18 2057 98.3 °F (36.8 °C) (!) 126 17 114/84 93 %          Thank you for allowing us to provide you with medical care today. We realize that you have many choices for your emergency care needs. Please choose us in the future for any continued health care needs. Sahara James, [address] [facility].   Office: [phone]            Recent Results (from the past 24 hour(s))   CBC WITH AUTOMATED DIFF    Collection Time: 05/01/18  9:20 AM   Result Value Ref Range    WBC 6.2 4.1 - 11.1 K/uL    RBC 5.44 4.10 - 5.70 M/uL    HGB 13.1 12.1 - 17.0 g/dL    HCT 42.4 36.6 - 50.3 %    MCV 77.9 (L) 80.0 - 99.0 FL    MCH 24.1 (L) 26.0 - 34.0 PG    MCHC 30.9 30.0 - 36.5 g/dL RDW 16.9 (H) 11.5 - 14.5 %    PLATELET  195 - 236 K/uL     UNABLE TO REPORT ACCURATE COUNT DUE TO PLATELET AGGREGATION, HOWEVER, PLATELETS APPEAR NORMAL IN NUMBER ON SMEAR. PLEASE RESUBMIT SODIUM CITRATE (BLUE) AND EDTA (LAVENDER) TUBES FOR HEMATOLOGICAL TESTING. NRBC 0.0 0  WBC    ABSOLUTE NRBC 0.00 0.00 - 0.01 K/uL    NEUTROPHILS 76 (H) 32 - 75 %    LYMPHOCYTES 13 12 - 49 %    MONOCYTES 10 5 - 13 %    EOSINOPHILS 1 0 - 7 %    BASOPHILS 0 0 - 1 %    IMMATURE GRANULOCYTES 0 0.0 - 0.5 %    ABS. NEUTROPHILS 4.7 1.8 - 8.0 K/UL    ABS. LYMPHOCYTES 0.8 0.8 - 3.5 K/UL    ABS. MONOCYTES 0.6 0.0 - 1.0 K/UL    ABS. EOSINOPHILS 0.1 0.0 - 0.4 K/UL    ABS. BASOPHILS 0.0 0.0 - 0.1 K/UL    ABS. IMM. GRANS. 0.0 0.00 - 0.04 K/UL    DF SMEAR SCANNED      RBC COMMENTS ANISOCYTOSIS  1+        RBC COMMENTS OVALOCYTES  PRESENT        RBC COMMENTS MICROCYTOSIS  1+       METABOLIC PANEL, COMPREHENSIVE    Collection Time: 05/01/18  9:20 AM   Result Value Ref Range    Sodium 138 136 - 145 mmol/L    Potassium 4.3 3.5 - 5.1 mmol/L    Chloride 103 97 - 108 mmol/L    CO2 25 21 - 32 mmol/L    Anion gap 10 5 - 15 mmol/L    Glucose 85 65 - 100 mg/dL    BUN 19 6 - 20 MG/DL    Creatinine 0.89 0.70 - 1.30 MG/DL    BUN/Creatinine ratio 21 (H) 12 - 20      GFR est AA >60 >60 ml/min/1.73m2    GFR est non-AA >60 >60 ml/min/1.73m2    Calcium 9.4 8.5 - 10.1 MG/DL    Bilirubin, total 0.2 0.2 - 1.0 MG/DL    ALT (SGPT) 30 12 - 78 U/L    AST (SGOT) 23 15 - 37 U/L    Alk.  phosphatase 64 45 - 117 U/L    Protein, total 8.4 (H) 6.4 - 8.2 g/dL    Albumin 3.7 3.5 - 5.0 g/dL    Globulin 4.7 (H) 2.0 - 4.0 g/dL    A-G Ratio 0.8 (L) 1.1 - 2.2     URINALYSIS W/MICROSCOPIC    Collection Time: 05/01/18  9:20 AM   Result Value Ref Range    Color YELLOW/STRAW      Appearance CLOUDY (A) CLEAR      Specific gravity 1.021 1.003 - 1.030      pH (UA) 7.0 5.0 - 8.0      Protein NEGATIVE  NEG mg/dL    Glucose NEGATIVE  NEG mg/dL    Ketone NEGATIVE  NEG mg/dL Bilirubin NEGATIVE  NEG      Blood NEGATIVE  NEG      Urobilinogen 0.2 0.2 - 1.0 EU/dL    Nitrites NEGATIVE  NEG      Leukocyte Esterase NEGATIVE  NEG      WBC 0-4 0 - 4 /hpf    RBC 0-5 0 - 5 /hpf    Epithelial cells FEW FEW /lpf    Bacteria NEGATIVE  NEG /hpf    Hyaline cast 0-2 0 - 5 /lpf   URINE CULTURE HOLD SAMPLE    Collection Time: 05/01/18  9:20 AM   Result Value Ref Range    Urine culture hold        URINE ON HOLD IN MICROBIOLOGY DEPT FOR 3 DAYS. IF UNPRESERVED URINE IS SUBMITTED, IT CANNOT BE USED FOR ADDITIONAL TESTING AFTER 24 HRS, RECOLLECTION WILL BE REQUIRED. EKG, 12 LEAD, INITIAL    Collection Time: 05/01/18  9:30 AM   Result Value Ref Range    Ventricular Rate 100 BPM    Atrial Rate 100 BPM    P-R Interval 130 ms    QRS Duration 100 ms    Q-T Interval 364 ms    QTC Calculation (Bezet) 469 ms    Calculated P Axis 17 degrees    Calculated R Axis 17 degrees    Calculated T Axis 9 degrees    Diagnosis       Normal sinus rhythm  Incomplete right bundle branch block  Possible Inferior infarct , age undetermined  T wave abnormality, consider anterior ischemia  Abnormal ECG  When compared with ECG of 13-FEB-2017 23:53,  No significant change    Confirmed by Kristen ORTIZ, [place] ([id number]) on 5/1/2018 8:04:47 PM     CBC WITH AUTOMATED DIFF    Collection Time: 05/01/18  9:16 PM   Result Value Ref Range    WBC 9.6 4.1 - 11.1 K/uL    RBC 5.52 4.10 - 5.70 M/uL    HGB 13.2 12.1 - 17.0 g/dL    HCT 42.5 36.6 - 50.3 %    MCV 77.0 (L) 80.0 - 99.0 FL    MCH 23.9 (L) 26.0 - 34.0 PG    MCHC 31.1 30.0 - 36.5 g/dL    RDW 16.9 (H) 11.5 - 14.5 %    PLATELET 731 938 - 265 K/uL    NRBC 0.0 0  WBC    ABSOLUTE NRBC 0.00 0.00 - 0.01 K/uL    NEUTROPHILS 74 32 - 75 %    LYMPHOCYTES 14 12 - 49 %    MONOCYTES 11 5 - 13 %    EOSINOPHILS 1 0 - 7 %    BASOPHILS 0 0 - 1 %    IMMATURE GRANULOCYTES 0 0.0 - 0.5 %    ABS. NEUTROPHILS 7.1 1.8 - 8.0 K/UL    ABS. LYMPHOCYTES 1.3 0.8 - 3.5 K/UL    ABS. MONOCYTES 1.1 (H) 0.0 - 1.0 K/UL    ABS. EOSINOPHILS 0.1 0.0 - 0.4 K/UL    ABS. BASOPHILS 0.0 0.0 - 0.1 K/UL    ABS. IMM. GRANS. 0.0 0.00 - 0.04 K/UL    DF SMEAR SCANNED      PLATELET COMMENTS CLUMPED PLATELETS      RBC COMMENTS NORMOCYTIC, NORMOCHROMIC     METABOLIC PANEL, COMPREHENSIVE    Collection Time: 05/01/18  9:16 PM   Result Value Ref Range    Sodium 140 136 - 145 mmol/L    Potassium 4.1 3.5 - 5.1 mmol/L    Chloride 102 97 - 108 mmol/L    CO2 25 21 - 32 mmol/L    Anion gap 13 5 - 15 mmol/L    Glucose 97 65 - 100 mg/dL    BUN 17 6 - 20 MG/DL    Creatinine 0.90 0.70 - 1.30 MG/DL    BUN/Creatinine ratio 19 12 - 20      GFR est AA >60 >60 ml/min/1.73m2    GFR est non-AA >60 >60 ml/min/1.73m2    Calcium 9.2 8.5 - 10.1 MG/DL    Bilirubin, total 0.3 0.2 - 1.0 MG/DL    ALT (SGPT) 32 12 - 78 U/L    AST (SGOT) 33 15 - 37 U/L    Alk. phosphatase 67 45 - 117 U/L    Protein, total 8.4 (H) 6.4 - 8.2 g/dL    Albumin 3.8 3.5 - 5.0 g/dL    Globulin 4.6 (H) 2.0 - 4.0 g/dL    A-G Ratio 0.8 (L) 1.1 - 2.2     LACTIC ACID    Collection Time: 05/01/18  9:16 PM   Result Value Ref Range    Lactic acid 1.7 0.4 - 2.0 MMOL/L       Xr Chest Port    Result Date: 5/1/2018  Indication: Chest pain, seizures this morning, follow-up pleural effusion Comparison: 2/14/2017 Portable exam of the chest obtained at 926 demonstrates normal heart size. There is no change in the moderate right pleural effusion with underlying consolidation. The osseous structures are unremarkable. Shunt tubing is unchanged in position. Impression: Persistent moderate right pleural effusion with underlying consolidation.

## 2018-05-03 LAB — LACOSAMIDE SERPL-MCNC: 4.1 UG/ML (ref 5–10)

## 2018-08-07 ENCOUNTER — HOSPITAL ENCOUNTER (EMERGENCY)
Age: 33
Discharge: HOME | End: 2018-08-07
Attending: EMERGENCY MEDICINE
Payer: MEDICARE

## 2018-08-07 VITALS
SYSTOLIC BLOOD PRESSURE: 107 MMHG | OXYGEN SATURATION: 99 % | DIASTOLIC BLOOD PRESSURE: 69 MMHG | RESPIRATION RATE: 26 BRPM | BODY MASS INDEX: 18.75 KG/M2 | WEIGHT: 150 LBS | HEART RATE: 105 BPM

## 2018-08-07 DIAGNOSIS — R56.9 SEIZURE (HCC): Primary | ICD-10-CM

## 2018-08-07 LAB
AMPHET UR QL SCN: NEGATIVE
ANION GAP SERPL CALC-SCNC: 19 MMOL/L (ref 5–15)
APPEARANCE UR: ABNORMAL
BACTERIA URNS QL MICRO: NEGATIVE /HPF
BARBITURATES UR QL SCN: NEGATIVE
BASOPHILS # BLD: 0.1 K/UL (ref 0–0.1)
BASOPHILS NFR BLD: 1 % (ref 0–1)
BENZODIAZ UR QL: NEGATIVE
BILIRUB UR QL: NEGATIVE
BUN SERPL-MCNC: 18 MG/DL (ref 6–20)
BUN/CREAT SERPL: 17 (ref 12–20)
CALCIUM SERPL-MCNC: 8.7 MG/DL (ref 8.5–10.1)
CANNABINOIDS UR QL SCN: NEGATIVE
CHLORIDE SERPL-SCNC: 102 MMOL/L (ref 97–108)
CO2 SERPL-SCNC: 19 MMOL/L (ref 21–32)
COCAINE UR QL SCN: NEGATIVE
COLOR UR: ABNORMAL
CREAT SERPL-MCNC: 1.07 MG/DL (ref 0.7–1.3)
DIFFERENTIAL METHOD BLD: ABNORMAL
DRUG SCRN COMMENT,DRGCM: NORMAL
EOSINOPHIL # BLD: 0.1 K/UL (ref 0–0.4)
EOSINOPHIL NFR BLD: 1 % (ref 0–7)
EPITH CASTS URNS QL MICRO: ABNORMAL /LPF
ERYTHROCYTE [DISTWIDTH] IN BLOOD BY AUTOMATED COUNT: 15.9 % (ref 11.5–14.5)
GLUCOSE SERPL-MCNC: 113 MG/DL (ref 65–100)
GLUCOSE UR STRIP.AUTO-MCNC: NEGATIVE MG/DL
HCT VFR BLD AUTO: 44.9 % (ref 36.6–50.3)
HGB BLD-MCNC: 13.4 G/DL (ref 12.1–17)
HGB UR QL STRIP: NEGATIVE
HYALINE CASTS URNS QL MICRO: ABNORMAL /LPF (ref 0–5)
IMM GRANULOCYTES # BLD: 0 K/UL (ref 0–0.04)
IMM GRANULOCYTES NFR BLD AUTO: 0 % (ref 0–0.5)
KETONES UR QL STRIP.AUTO: NEGATIVE MG/DL
LEUKOCYTE ESTERASE UR QL STRIP.AUTO: NEGATIVE
LYMPHOCYTES # BLD: 3.1 K/UL (ref 0.8–3.5)
LYMPHOCYTES NFR BLD: 36 % (ref 12–49)
MCH RBC QN AUTO: 24 PG (ref 26–34)
MCHC RBC AUTO-ENTMCNC: 29.8 G/DL (ref 30–36.5)
MCV RBC AUTO: 80.5 FL (ref 80–99)
METHADONE UR QL: NEGATIVE
MONOCYTES # BLD: 1 K/UL (ref 0–1)
MONOCYTES NFR BLD: 12 % (ref 5–13)
MUCOUS THREADS URNS QL MICRO: ABNORMAL /LPF
NEUTS SEG # BLD: 4.3 K/UL (ref 1.8–8)
NEUTS SEG NFR BLD: 50 % (ref 32–75)
NITRITE UR QL STRIP.AUTO: NEGATIVE
NRBC # BLD: 0 K/UL (ref 0–0.01)
NRBC BLD-RTO: 0 PER 100 WBC
OPIATES UR QL: NEGATIVE
PCP UR QL: NEGATIVE
PH UR STRIP: 5.5 [PH] (ref 5–8)
PLATELET # BLD AUTO: 163 K/UL (ref 150–400)
PMV BLD AUTO: 10.8 FL (ref 8.9–12.9)
POTASSIUM SERPL-SCNC: 3.6 MMOL/L (ref 3.5–5.1)
PROT UR STRIP-MCNC: 30 MG/DL
RBC # BLD AUTO: 5.58 M/UL (ref 4.1–5.7)
RBC #/AREA URNS HPF: ABNORMAL /HPF (ref 0–5)
SODIUM SERPL-SCNC: 140 MMOL/L (ref 136–145)
SP GR UR REFRACTOMETRY: 1.03 (ref 1–1.03)
UR CULT HOLD, URHOLD: NORMAL
UROBILINOGEN UR QL STRIP.AUTO: 0.2 EU/DL (ref 0.2–1)
WBC # BLD AUTO: 8.5 K/UL (ref 4.1–11.1)
WBC URNS QL MICRO: ABNORMAL /HPF (ref 0–4)

## 2018-08-07 PROCEDURE — 85025 COMPLETE CBC W/AUTO DIFF WBC: CPT | Performed by: EMERGENCY MEDICINE

## 2018-08-07 PROCEDURE — 36415 COLL VENOUS BLD VENIPUNCTURE: CPT | Performed by: EMERGENCY MEDICINE

## 2018-08-07 PROCEDURE — 96360 HYDRATION IV INFUSION INIT: CPT

## 2018-08-07 PROCEDURE — 80048 BASIC METABOLIC PNL TOTAL CA: CPT | Performed by: EMERGENCY MEDICINE

## 2018-08-07 PROCEDURE — 74011250636 HC RX REV CODE- 250/636: Performed by: EMERGENCY MEDICINE

## 2018-08-07 PROCEDURE — 80307 DRUG TEST PRSMV CHEM ANLYZR: CPT | Performed by: EMERGENCY MEDICINE

## 2018-08-07 PROCEDURE — 81001 URINALYSIS AUTO W/SCOPE: CPT | Performed by: EMERGENCY MEDICINE

## 2018-08-07 PROCEDURE — 99283 EMERGENCY DEPT VISIT LOW MDM: CPT

## 2018-08-07 RX ORDER — SODIUM CHLORIDE 0.9 % (FLUSH) 0.9 %
5-10 SYRINGE (ML) INJECTION AS NEEDED
Status: DISCONTINUED | OUTPATIENT
Start: 2018-08-07 | End: 2018-08-07 | Stop reason: HOSPADM

## 2018-08-07 RX ORDER — SODIUM CHLORIDE 0.9 % (FLUSH) 0.9 %
5-10 SYRINGE (ML) INJECTION EVERY 8 HOURS
Status: DISCONTINUED | OUTPATIENT
Start: 2018-08-07 | End: 2018-08-07 | Stop reason: HOSPADM

## 2018-08-07 RX ADMIN — Medication 10 ML: at 14:46

## 2018-08-07 RX ADMIN — SODIUM CHLORIDE 1000 ML: 900 INJECTION, SOLUTION INTRAVENOUS at 14:46

## 2018-08-07 NOTE — DISCHARGE INSTRUCTIONS
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
## 2018-08-07 NOTE — ED PROVIDER NOTES
Patient is a 34 y.o. male presenting with seizures. The history is provided by a relative and a caregiver. Seizure    This is a recurrent problem. The current episode started less than 1 hour ago. The problem has been resolved. There was 1 seizure. The most recent episode lasted 2 to 5 minutes. Pertinent negatives include no cough. Characteristics include rhythmic jerking and loss of consciousness. Characteristics do not include apnea. The episode was witnessed. There was no sensation of an aura present. The seizures did not continue in the ED. The seizure(s) had no focality. Possible causes do not include medication or dosage change, missed seizure meds or missed seizure meds. There has been no fever. He reports no cough. Associated symptoms comments: Family noticed that urine was a darker color and had a strong odor. There were no medications administered prior to arrival. Home seizure meds: Vimpat. Past Medical History:   Diagnosis Date    Neurological disorder     traumatic brain injury    Seizures ([facility] [address]        No past surgical history on file. No family history on file. Social History     Social History    Marital status: SINGLE     Spouse name: N/A    Number of children: N/A    Years of education: N/A     Occupational History    Not on file. Social History Main Topics    Smoking status: Never Smoker    Smokeless tobacco: Never Used    Alcohol use Not on file    Drug use: No    Sexual activity: Not on file     Other Topics Concern    Not on file     Social History Narrative         ALLERGIES: Review of patient's allergies indicates no known allergies. Review of Systems   Respiratory: Negative for apnea and cough. Neurological: Positive for loss of consciousness. All other systems reviewed and are negative.       Vitals:    08/07/18 1400   BP: 126/85   Pulse: (!) 130   Resp: 26   SpO2: 92%   Weight: 68 kg (150 lb)            Physical Exam   Constitutional: He appears well-developed and well-nourished. No distress. HENT:   Shunt in place without evidence of infection, blockage, or trauma   Eyes: Pupils are equal, round, and reactive to light. No scleral icterus. Neck: Normal range of motion. Neck supple. Cardiovascular: Regular rhythm. Tachycardia present. Pulmonary/Chest: Effort normal and breath sounds normal.   Abdominal: Soft. He exhibits no distension. There is no tenderness. There is no rebound and no guarding. Musculoskeletal: Normal range of motion. Neurological:   Eyes open spontaneously, patient making effort to follow commands but not yet making coordinated movements, patient non-verbal at baseline, spasticity secondary to TBI   Skin: Skin is warm and dry. He is not diaphoretic. Psychiatric: His behavior is normal.   Nursing note and vitals reviewed. MDM  Number of Diagnoses or Management Options  Diagnosis management comments: 3:31 PM   Pt w hx of seizure presents with same. Patient returned to baseline; no fevers, UTI, or electrolyte disorders. Patient will follow-up with his PCP for additional management and continue on his Vimpat. ED Course       Procedures    The patient's results have been reviewed with them and/or available family. Patient and/or family verbally conveyed their understanding and agreement of the patient's signs, symptoms, diagnosis, treatment and prognosis and additionally agree to follow up as recommended in the discharge instructions or to return to the Emergency Room should their condition change prior to their follow-up appointment. The patient/family verbally agrees with the care-plan and verbally conveys that all of their questions have been answered.  The discharge instructions have also been provided to the patient and/or family with some educational information regarding the patient's diagnosis as well a list of reasons why the patient would want to return to the ER prior to their follow-up appointment, should their condition change.

## 2018-08-07 NOTE — ED NOTES
Patient discharged by MD. family given the opportunity to ask questions, verbalized understanding of teaching. Lift team in ED to transfer patient with [facility] lift back to wheelchair for discharge.

## 2019-07-23 ENCOUNTER — APPOINTMENT (OUTPATIENT)
Dept: CT IMAGING | Age: 34
DRG: 698 | End: 2019-07-23
Attending: EMERGENCY MEDICINE
Payer: MEDICARE

## 2019-07-23 ENCOUNTER — APPOINTMENT (OUTPATIENT)
Dept: GENERAL RADIOLOGY | Age: 34
DRG: 698 | End: 2019-07-23
Attending: EMERGENCY MEDICINE
Payer: MEDICARE

## 2019-07-23 ENCOUNTER — HOSPITAL ENCOUNTER (EMERGENCY)
Age: 34
Discharge: HOME | DRG: 698 | End: 2019-07-23
Attending: EMERGENCY MEDICINE
Payer: MEDICARE

## 2019-07-23 VITALS
SYSTOLIC BLOOD PRESSURE: 117 MMHG | WEIGHT: 150 LBS | TEMPERATURE: 98.6 F | OXYGEN SATURATION: 100 % | BODY MASS INDEX: 19.25 KG/M2 | HEART RATE: 86 BPM | RESPIRATION RATE: 20 BRPM | DIASTOLIC BLOOD PRESSURE: 92 MMHG | HEIGHT: 74 IN

## 2019-07-23 DIAGNOSIS — R56.9 SEIZURE (HCC): Primary | ICD-10-CM

## 2019-07-23 LAB
ALBUMIN SERPL-MCNC: 3.2 G/DL (ref 3.5–5)
ALBUMIN/GLOB SERPL: 0.7 {RATIO} (ref 1.1–2.2)
ALP SERPL-CCNC: 64 U/L (ref 45–117)
ALT SERPL-CCNC: 20 U/L (ref 12–78)
ANION GAP SERPL CALC-SCNC: 4 MMOL/L (ref 5–15)
APPEARANCE UR: CLEAR
AST SERPL-CCNC: 14 U/L (ref 15–37)
BACTERIA URNS QL MICRO: NEGATIVE /HPF
BASOPHILS # BLD: 0.1 K/UL (ref 0–0.1)
BASOPHILS NFR BLD: 1 % (ref 0–1)
BILIRUB SERPL-MCNC: 0.2 MG/DL (ref 0.2–1)
BILIRUB UR QL: NEGATIVE
BUN SERPL-MCNC: 17 MG/DL (ref 6–20)
BUN/CREAT SERPL: 25 (ref 12–20)
CALCIUM SERPL-MCNC: 8.8 MG/DL (ref 8.5–10.1)
CHLORIDE SERPL-SCNC: 107 MMOL/L (ref 97–108)
CO2 SERPL-SCNC: 29 MMOL/L (ref 21–32)
COLOR UR: ABNORMAL
COMMENT, HOLDF: NORMAL
CREAT SERPL-MCNC: 0.68 MG/DL (ref 0.7–1.3)
DIFFERENTIAL METHOD BLD: ABNORMAL
EOSINOPHIL # BLD: 0.1 K/UL (ref 0–0.4)
EOSINOPHIL NFR BLD: 1 % (ref 0–7)
EPITH CASTS URNS QL MICRO: ABNORMAL /LPF
ERYTHROCYTE [DISTWIDTH] IN BLOOD BY AUTOMATED COUNT: 15.1 % (ref 11.5–14.5)
GLOBULIN SER CALC-MCNC: 4.5 G/DL (ref 2–4)
GLUCOSE BLD STRIP.AUTO-MCNC: 83 MG/DL (ref 65–100)
GLUCOSE SERPL-MCNC: 95 MG/DL (ref 65–100)
GLUCOSE UR STRIP.AUTO-MCNC: NEGATIVE MG/DL
HCT VFR BLD AUTO: 35.2 % (ref 36.6–50.3)
HGB BLD-MCNC: 10.5 G/DL (ref 12.1–17)
HGB UR QL STRIP: NEGATIVE
HYALINE CASTS URNS QL MICRO: ABNORMAL /LPF (ref 0–5)
IMM GRANULOCYTES # BLD AUTO: 0 K/UL (ref 0–0.04)
IMM GRANULOCYTES NFR BLD AUTO: 0 % (ref 0–0.5)
KETONES UR QL STRIP.AUTO: NEGATIVE MG/DL
LEUKOCYTE ESTERASE UR QL STRIP.AUTO: ABNORMAL
LYMPHOCYTES # BLD: 0.9 K/UL (ref 0.8–3.5)
LYMPHOCYTES NFR BLD: 10 % (ref 12–49)
MCH RBC QN AUTO: 23.1 PG (ref 26–34)
MCHC RBC AUTO-ENTMCNC: 29.8 G/DL (ref 30–36.5)
MCV RBC AUTO: 77.5 FL (ref 80–99)
MONOCYTES # BLD: 0.6 K/UL (ref 0–1)
MONOCYTES NFR BLD: 6 % (ref 5–13)
NEUTS SEG # BLD: 8 K/UL (ref 1.8–8)
NEUTS SEG NFR BLD: 82 % (ref 32–75)
NITRITE UR QL STRIP.AUTO: NEGATIVE
NRBC # BLD: 0 K/UL (ref 0–0.01)
NRBC BLD-RTO: 0 PER 100 WBC
PH UR STRIP: 6 [PH] (ref 5–8)
PLATELET # BLD AUTO: 234 K/UL (ref 150–400)
PMV BLD AUTO: 9 FL (ref 8.9–12.9)
POTASSIUM SERPL-SCNC: 4.1 MMOL/L (ref 3.5–5.1)
PROT SERPL-MCNC: 7.7 G/DL (ref 6.4–8.2)
PROT UR STRIP-MCNC: NEGATIVE MG/DL
RBC # BLD AUTO: 4.54 M/UL (ref 4.1–5.7)
RBC #/AREA URNS HPF: ABNORMAL /HPF (ref 0–5)
SAMPLES BEING HELD,HOLD: NORMAL
SERVICE CMNT-IMP: NORMAL
SODIUM SERPL-SCNC: 140 MMOL/L (ref 136–145)
SP GR UR REFRACTOMETRY: 1.02 (ref 1–1.03)
UA: UC IF INDICATED,UAUC: ABNORMAL
UROBILINOGEN UR QL STRIP.AUTO: 0.2 EU/DL (ref 0.2–1)
WBC # BLD AUTO: 9.7 K/UL (ref 4.1–11.1)
WBC URNS QL MICRO: ABNORMAL /HPF (ref 0–4)

## 2019-07-23 PROCEDURE — 70250 X-RAY EXAM OF SKULL: CPT

## 2019-07-23 PROCEDURE — 51701 INSERT BLADDER CATHETER: CPT

## 2019-07-23 PROCEDURE — 80339 ANTIEPILEPTICS NOS 1-3: CPT

## 2019-07-23 PROCEDURE — 82962 GLUCOSE BLOOD TEST: CPT

## 2019-07-23 PROCEDURE — 96360 HYDRATION IV INFUSION INIT: CPT

## 2019-07-23 PROCEDURE — 81001 URINALYSIS AUTO W/SCOPE: CPT

## 2019-07-23 PROCEDURE — 99285 EMERGENCY DEPT VISIT HI MDM: CPT

## 2019-07-23 PROCEDURE — 80053 COMPREHEN METABOLIC PANEL: CPT

## 2019-07-23 PROCEDURE — 74011250636 HC RX REV CODE- 250/636: Performed by: EMERGENCY MEDICINE

## 2019-07-23 PROCEDURE — 77030005563 HC CATH URETH INT MMGH -A

## 2019-07-23 PROCEDURE — 85025 COMPLETE CBC W/AUTO DIFF WBC: CPT

## 2019-07-23 PROCEDURE — 70450 CT HEAD/BRAIN W/O DYE: CPT

## 2019-07-23 PROCEDURE — 87086 URINE CULTURE/COLONY COUNT: CPT

## 2019-07-23 RX ADMIN — SODIUM CHLORIDE 500 ML: 900 INJECTION, SOLUTION INTRAVENOUS at 16:03
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
## 2019-07-23 NOTE — ED NOTES
Bladder scan performed prior to straight cath with approximately 180mL visualized in bladder. Pt straight catheterized by this RN with assistance from [facility]. Yellow/straw urine obtained and sent to lab per orders. Pt tolerated procedure well.
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
## 2019-07-23 NOTE — DISCHARGE INSTRUCTIONS
Patient Education        Seizure: Care Instructions  Your Care Instructions    Seizures are caused by abnormal patterns of electrical signals in the brain. They are different for each person. Seizures can affect movement, speech, vision, or awareness. Some people have only slight shaking of a hand and do not pass out. Other people may pass out and have violent shaking of the whole body. Some people appear to stare into space. They are awake, but they can't respond normally. Later, they may not remember what happened. You may need tests to identify the type and cause of the seizures. A seizure may occur only once, or you may have them more than one time. Taking medicines as directed and following up with your doctor may help keep you from having more seizures. The doctor has checked you carefully, but problems can develop later. If you notice any problems or new symptoms, get medical treatment right away. Follow-up care is a key part of your treatment and safety. Be sure to make and go to all appointments, and call your doctor if you are having problems. It's also a good idea to know your test results and keep a list of the medicines you take. How can you care for yourself at home? · Be safe with medicines. Take your medicines exactly as prescribed. Call your doctor if you think you are having a problem with your medicine. · Do not do any activity that could be dangerous to you or others until your doctor says it is safe to do so. For example, do not drive a car, operate machinery, swim, or climb ladders. · Be sure that anyone treating you for any health problem knows that you have had a seizure and what medicines you are taking for it. · Identify and avoid things that may make you more likely to have a seizure. These may include lack of sleep, alcohol or drug use, stress, or not eating. · Make sure you go to your follow-up appointment. When should you call for help?   Call 911 anytime you think you may need emergency care. For example, call if:    · You have another seizure.     · You have more than one seizure in 24 hours.     · You have new symptoms, such as trouble walking, speaking, or thinking clearly.    Call your doctor now or seek immediate medical care if:    · You are not acting normally.    Watch closely for changes in your health, and be sure to contact your doctor if you have any problems. Where can you learn more? Go to http://mohan-steven.info/. Enter B978 in the search box to learn more about \"Seizure: Care Instructions. \"  Current as of: March 28, 2019  Content Version: 12.1  © 4952-4370 [employer]. Care instructions adapted under license by [employer] (which disclaims liability or warranty for this information). If you have questions about a medical condition or this instruction, always ask your healthcare professional. [address] any warranty or liability for your use of this information. We hope that we have addressed all of your medical concerns. The examination and treatment you received in the Emergency Department were for an emergent problem and were not intended as complete care. It is important that you follow up with your healthcare provider(s) for ongoing care. If your symptoms worsen or do not improve as expected, and you are unable to reach your usual health care provider(s), you should return to the Emergency Department. Today's healthcare is undergoing tremendous change, and patient satisfaction surveys are one of the many tools to assess the quality of medical care. You may receive a survey from the [employer] organization regarding your experience in the Emergency Department. I hope that your experience has been completely positive, particularly the medical care that I provided.   As such, please participate in the survey; anything less than excellent does not meet my expectations or intentions. [facility] and [facility] participate in nationally recognized quality of care measures. If your blood pressure is greater than 120/80, as reported below, we urge that you seek medical care to address the potential of high blood pressure, commonly known as hypertension. Hypertension can be hereditary or can be caused by certain medical conditions, pain, stress, or \"white coat syndrome. \"       Please make an appointment with your health care provider(s) for follow up of your Emergency Department visit. VITALS:   Patient Vitals for the past 8 hrs:   Temp Pulse Resp BP SpO2   07/23/19 1544 98.6 °F (37 °C) -- -- -- --   07/23/19 1542 -- 80 17 97/70 100 %   07/23/19 1517 97.4 °F (36.3 °C) 94 16 90/60 98 %          Thank you for allowing us to provide you with medical care today. We realize that you have many choices for your emergency care needs. Please choose us in the future for any continued health care needs. [address]   Office: [phone]            Recent Results (from the past 24 hour(s))   GLUCOSE, POC    Collection Time: 07/23/19  3:47 PM   Result Value Ref Range    Glucose (POC) 83 65 - 100 mg/dL    Performed by Tez Downey (tech)    CBC WITH AUTOMATED DIFF    Collection Time: 07/23/19  3:54 PM   Result Value Ref Range    WBC 9.7 4.1 - 11.1 K/uL    RBC 4.54 4.10 - 5.70 M/uL    HGB 10.5 (L) 12.1 - 17.0 g/dL    HCT 35.2 (L) 36.6 - 50.3 %    MCV 77.5 (L) 80.0 - 99.0 FL    MCH 23.1 (L) 26.0 - 34.0 PG    MCHC 29.8 (L) 30.0 - 36.5 g/dL    RDW 15.1 (H) 11.5 - 14.5 %    PLATELET 571 465 - 137 K/uL    MPV 9.0 8.9 - 12.9 FL    NRBC 0.0 0  WBC    ABSOLUTE NRBC 0.00 0.00 - 0.01 K/uL    NEUTROPHILS 82 (H) 32 - 75 %    LYMPHOCYTES 10 (L) 12 - 49 %    MONOCYTES 6 5 - 13 %    EOSINOPHILS 1 0 - 7 %    BASOPHILS 1 0 - 1 %    IMMATURE GRANULOCYTES 0 0.0 - 0.5 %    ABS. NEUTROPHILS 8.0 1.8 - 8.0 K/UL    ABS. LYMPHOCYTES 0.9 0.8 - 3.5 K/UL    ABS. MONOCYTES 0.6 0.0 - 1.0 K/UL    ABS. EOSINOPHILS 0.1 0.0 - 0.4 K/UL    ABS. BASOPHILS 0.1 0.0 - 0.1 K/UL    ABS. IMM. GRANS. 0.0 0.00 - 0.04 K/UL    DF AUTOMATED     METABOLIC PANEL, COMPREHENSIVE    Collection Time: 07/23/19  3:54 PM   Result Value Ref Range    Sodium 140 136 - 145 mmol/L    Potassium 4.1 3.5 - 5.1 mmol/L    Chloride 107 97 - 108 mmol/L    CO2 29 21 - 32 mmol/L    Anion gap 4 (L) 5 - 15 mmol/L    Glucose 95 65 - 100 mg/dL    BUN 17 6 - 20 MG/DL    Creatinine 0.68 (L) 0.70 - 1.30 MG/DL    BUN/Creatinine ratio 25 (H) 12 - 20      GFR est AA >60 >60 ml/min/1.73m2    GFR est non-AA >60 >60 ml/min/1.73m2    Calcium 8.8 8.5 - 10.1 MG/DL    Bilirubin, total 0.2 0.2 - 1.0 MG/DL    ALT (SGPT) 20 12 - 78 U/L    AST (SGOT) 14 (L) 15 - 37 U/L    Alk. phosphatase 64 45 - 117 U/L    Protein, total 7.7 6.4 - 8.2 g/dL    Albumin 3.2 (L) 3.5 - 5.0 g/dL    Globulin 4.5 (H) 2.0 - 4.0 g/dL    A-G Ratio 0.7 (L) 1.1 - 2.2     SAMPLES BEING HELD    Collection Time: 07/23/19  3:54 PM   Result Value Ref Range    SAMPLES BEING HELD GOLD     COMMENT        Add-on orders for these samples will be processed based on acceptable specimen integrity and analyte stability, which may vary by analyte.    URINALYSIS W/ REFLEX CULTURE    Collection Time: 07/23/19  5:35 PM   Result Value Ref Range    Color YELLOW/STRAW      Appearance CLEAR CLEAR      Specific gravity 1.022 1.003 - 1.030      pH (UA) 6.0 5.0 - 8.0      Protein NEGATIVE  NEG mg/dL    Glucose NEGATIVE  NEG mg/dL    Ketone NEGATIVE  NEG mg/dL    Bilirubin NEGATIVE  NEG      Blood NEGATIVE  NEG      Urobilinogen 0.2 0.2 - 1.0 EU/dL    Nitrites NEGATIVE  NEG      Leukocyte Esterase TRACE (A) NEG      WBC 5-10 0 - 4 /hpf    RBC 20-50 0 - 5 /hpf    Epithelial cells FEW FEW /lpf    Bacteria NEGATIVE  NEG /hpf    UA:UC IF INDICATED URINE CULTURE ORDERED (A) CNI      Hyaline cast 0-2 0 - 5 /lpf       Xr Shunt Series    Result Date: 7/23/2019  EXAMINATION: XR SHUNT SERIES INDICATION: Seizure. COMPARISON: None. TECHNIQUE:  AP and lateral skull, AP neck, AP chest, and AP abdomen are obtained to evaluate a right ventriculoperitoneal shunt. FINDINGS:  Skull: There is a right ventriculoperitoneal shunt catheter. The catheter is intact. No other bony abnormalities are seen. Chest:  The shunt catheter is intact and passes over the mid right chest. There is a small right pleural effusion with underlying atelectasis. Abdomen: The shunt catheter is intact and terminates in the right lower quadrant. Normal bowel gas pattern. IMPRESSION:  Intact right sided ventriculoperitoneal shunt catheter with the tip projecting over the right lower quadrant. Small right effusion with underlying atelectasis. Ct Head Wo Cont    Result Date: 7/23/2019  EXAM: CT HEAD WO CONT INDICATION: seizure, shunt revision 2 weeks ago COMPARISON: None. CONTRAST: None. TECHNIQUE: Unenhanced CT of the head was performed using 5 mm images. Brain and bone windows were generated. CT dose reduction was achieved through use of a standardized protocol tailored for this examination and automatic exposure control for dose modulation. FINDINGS: There are chronic bland bilateral frontal infarcts, left greater than right. There is craniectomy change in the right parietal lobe. Ventricular shunt is present. There is no acute intracranial hemorrhage. IMPRESSION: No acute abnormality. Chronic bilateral frontal infarcts.

## 2019-07-25 ENCOUNTER — APPOINTMENT (OUTPATIENT)
Dept: CT IMAGING | Age: 34
DRG: 698 | End: 2019-07-25
Attending: EMERGENCY MEDICINE
Payer: MEDICARE

## 2019-07-25 ENCOUNTER — HOSPITAL ENCOUNTER (INPATIENT)
Age: 34
LOS: 6 days | Discharge: HOME HEALTH | DRG: 698 | End: 2019-07-31
Attending: EMERGENCY MEDICINE | Admitting: INTERNAL MEDICINE
Payer: MEDICARE

## 2019-07-25 DIAGNOSIS — R11.10 VOMITING, INTRACTABILITY OF VOMITING NOT SPECIFIED, PRESENCE OF NAUSEA NOT SPECIFIED, UNSPECIFIED VOMITING TYPE: ICD-10-CM

## 2019-07-25 DIAGNOSIS — Z71.89 GOALS OF CARE, COUNSELING/DISCUSSION: ICD-10-CM

## 2019-07-25 DIAGNOSIS — N39.0 COMPLICATED UTI (URINARY TRACT INFECTION): ICD-10-CM

## 2019-07-25 DIAGNOSIS — S06.9X9D TRAUMATIC BRAIN INJURY WITH LOSS OF CONSCIOUSNESS, SUBSEQUENT ENCOUNTER: Chronic | ICD-10-CM

## 2019-07-25 DIAGNOSIS — N12 PYELONEPHRITIS: Primary | ICD-10-CM

## 2019-07-25 DIAGNOSIS — Z71.89 DNR (DO NOT RESUSCITATE) DISCUSSION: ICD-10-CM

## 2019-07-25 DIAGNOSIS — Z71.89 ADVANCED CARE PLANNING/COUNSELING DISCUSSION: ICD-10-CM

## 2019-07-25 PROBLEM — A41.9 SEPSIS (HCC): Status: ACTIVE | Noted: 2019-07-25

## 2019-07-25 PROBLEM — S06.9XAA TBI (TRAUMATIC BRAIN INJURY): Chronic | Status: ACTIVE | Noted: 2019-07-25

## 2019-07-25 LAB
ALBUMIN SERPL-MCNC: 3.4 G/DL (ref 3.5–5)
ALBUMIN/GLOB SERPL: 0.7 {RATIO} (ref 1.1–2.2)
ALP SERPL-CCNC: 72 U/L (ref 45–117)
ALT SERPL-CCNC: 20 U/L (ref 12–78)
ANION GAP SERPL CALC-SCNC: 6 MMOL/L (ref 5–15)
APPEARANCE UR: ABNORMAL
AST SERPL-CCNC: 16 U/L (ref 15–37)
BACTERIA SPEC CULT: NORMAL
BACTERIA URNS QL MICRO: NEGATIVE /HPF
BASOPHILS # BLD: 0.1 K/UL (ref 0–0.1)
BASOPHILS NFR BLD: 0 % (ref 0–1)
BILIRUB SERPL-MCNC: 0.2 MG/DL (ref 0.2–1)
BILIRUB UR QL: NEGATIVE
BUN SERPL-MCNC: 17 MG/DL (ref 6–20)
BUN/CREAT SERPL: 24 (ref 12–20)
CALCIUM SERPL-MCNC: 9.6 MG/DL (ref 8.5–10.1)
CC UR VC: NORMAL
CHLORIDE SERPL-SCNC: 105 MMOL/L (ref 97–108)
CO2 SERPL-SCNC: 27 MMOL/L (ref 21–32)
COLOR UR: ABNORMAL
CREAT SERPL-MCNC: 0.72 MG/DL (ref 0.7–1.3)
DIFFERENTIAL METHOD BLD: ABNORMAL
EOSINOPHIL # BLD: 0 K/UL (ref 0–0.4)
EOSINOPHIL NFR BLD: 0 % (ref 0–7)
EPITH CASTS URNS QL MICRO: ABNORMAL /LPF
ERYTHROCYTE [DISTWIDTH] IN BLOOD BY AUTOMATED COUNT: 15.6 % (ref 11.5–14.5)
GLOBULIN SER CALC-MCNC: 5 G/DL (ref 2–4)
GLUCOSE SERPL-MCNC: 94 MG/DL (ref 65–100)
GLUCOSE UR STRIP.AUTO-MCNC: NEGATIVE MG/DL
HCT VFR BLD AUTO: 36.4 % (ref 36.6–50.3)
HGB BLD-MCNC: 11 G/DL (ref 12.1–17)
HGB UR QL STRIP: ABNORMAL
HYALINE CASTS URNS QL MICRO: ABNORMAL /LPF (ref 0–5)
IMM GRANULOCYTES # BLD AUTO: 0.1 K/UL (ref 0–0.04)
IMM GRANULOCYTES NFR BLD AUTO: 1 % (ref 0–0.5)
KETONES UR QL STRIP.AUTO: NEGATIVE MG/DL
LACOSAMIDE SERPL-MCNC: 9.7 UG/ML (ref 5–10)
LACTATE SERPL-SCNC: 1.3 MMOL/L (ref 0.4–2)
LEUKOCYTE ESTERASE UR QL STRIP.AUTO: ABNORMAL
LIPASE SERPL-CCNC: 183 U/L (ref 73–393)
LYMPHOCYTES # BLD: 1 K/UL (ref 0.8–3.5)
LYMPHOCYTES NFR BLD: 6 % (ref 12–49)
MCH RBC QN AUTO: 23.7 PG (ref 26–34)
MCHC RBC AUTO-ENTMCNC: 30.2 G/DL (ref 30–36.5)
MCV RBC AUTO: 78.4 FL (ref 80–99)
MONOCYTES # BLD: 1.3 K/UL (ref 0–1)
MONOCYTES NFR BLD: 8 % (ref 5–13)
NEUTS SEG # BLD: 14.2 K/UL (ref 1.8–8)
NEUTS SEG NFR BLD: 85 % (ref 32–75)
NITRITE UR QL STRIP.AUTO: NEGATIVE
NRBC # BLD: 0 K/UL (ref 0–0.01)
NRBC BLD-RTO: 0 PER 100 WBC
PH UR STRIP: 8 [PH] (ref 5–8)
PLATELET # BLD AUTO: 192 K/UL (ref 150–400)
PMV BLD AUTO: 10.7 FL (ref 8.9–12.9)
POTASSIUM SERPL-SCNC: 4.2 MMOL/L (ref 3.5–5.1)
PROT SERPL-MCNC: 8.4 G/DL (ref 6.4–8.2)
PROT UR STRIP-MCNC: ABNORMAL MG/DL
RBC # BLD AUTO: 4.64 M/UL (ref 4.1–5.7)
RBC #/AREA URNS HPF: ABNORMAL /HPF (ref 0–5)
SERVICE CMNT-IMP: NORMAL
SODIUM SERPL-SCNC: 138 MMOL/L (ref 136–145)
SP GR UR REFRACTOMETRY: 1.02 (ref 1–1.03)
UR CULT HOLD, URHOLD: NORMAL
UROBILINOGEN UR QL STRIP.AUTO: 0.2 EU/DL (ref 0.2–1)
WBC # BLD AUTO: 16.7 K/UL (ref 4.1–11.1)
WBC URNS QL MICRO: ABNORMAL /HPF (ref 0–4)

## 2019-07-25 PROCEDURE — 74011000258 HC RX REV CODE- 258: Performed by: INTERNAL MEDICINE

## 2019-07-25 PROCEDURE — 87086 URINE CULTURE/COLONY COUNT: CPT

## 2019-07-25 PROCEDURE — 74011250636 HC RX REV CODE- 250/636: Performed by: INTERNAL MEDICINE

## 2019-07-25 PROCEDURE — 99285 EMERGENCY DEPT VISIT HI MDM: CPT

## 2019-07-25 PROCEDURE — 74011250637 HC RX REV CODE- 250/637: Performed by: INTERNAL MEDICINE

## 2019-07-25 PROCEDURE — 87040 BLOOD CULTURE FOR BACTERIA: CPT

## 2019-07-25 PROCEDURE — 96374 THER/PROPH/DIAG INJ IV PUSH: CPT

## 2019-07-25 PROCEDURE — 74011250636 HC RX REV CODE- 250/636: Performed by: EMERGENCY MEDICINE

## 2019-07-25 PROCEDURE — 83690 ASSAY OF LIPASE: CPT

## 2019-07-25 PROCEDURE — 74011250636 HC RX REV CODE- 250/636: Performed by: STUDENT IN AN ORGANIZED HEALTH CARE EDUCATION/TRAINING PROGRAM

## 2019-07-25 PROCEDURE — 74011636320 HC RX REV CODE- 636/320: Performed by: RADIOLOGY

## 2019-07-25 PROCEDURE — 96361 HYDRATE IV INFUSION ADD-ON: CPT

## 2019-07-25 PROCEDURE — 74011000250 HC RX REV CODE- 250: Performed by: INTERNAL MEDICINE

## 2019-07-25 PROCEDURE — 85025 COMPLETE CBC W/AUTO DIFF WBC: CPT

## 2019-07-25 PROCEDURE — 74011250637 HC RX REV CODE- 250/637: Performed by: EMERGENCY MEDICINE

## 2019-07-25 PROCEDURE — 65270000029 HC RM PRIVATE

## 2019-07-25 PROCEDURE — 83605 ASSAY OF LACTIC ACID: CPT

## 2019-07-25 PROCEDURE — 81001 URINALYSIS AUTO W/SCOPE: CPT

## 2019-07-25 PROCEDURE — 74177 CT ABD & PELVIS W/CONTRAST: CPT

## 2019-07-25 PROCEDURE — 87077 CULTURE AEROBIC IDENTIFY: CPT

## 2019-07-25 PROCEDURE — 80053 COMPREHEN METABOLIC PANEL: CPT

## 2019-07-25 PROCEDURE — 36415 COLL VENOUS BLD VENIPUNCTURE: CPT

## 2019-07-25 PROCEDURE — 87186 SC STD MICRODIL/AGAR DIL: CPT

## 2019-07-25 RX ORDER — ACETAMINOPHEN 325 MG/1
TABLET ORAL
Status: DISPENSED
Start: 2019-07-25 | End: 2019-07-26

## 2019-07-25 RX ORDER — DEXTROSE MONOHYDRATE AND SODIUM CHLORIDE 5; .45 G/100ML; G/100ML
125 INJECTION, SOLUTION INTRAVENOUS CONTINUOUS
Status: DISCONTINUED | OUTPATIENT
Start: 2019-07-25 | End: 2019-07-27

## 2019-07-25 RX ORDER — DOCUSATE SODIUM 100 MG/1
100 CAPSULE, LIQUID FILLED ORAL 2 TIMES DAILY
Status: DISCONTINUED | OUTPATIENT
Start: 2019-07-26 | End: 2019-07-26 | Stop reason: SDUPTHER

## 2019-07-25 RX ORDER — LACOSAMIDE 100 MG/1
100 TABLET ORAL 2 TIMES DAILY
Status: DISCONTINUED | OUTPATIENT
Start: 2019-07-25 | End: 2019-07-28

## 2019-07-25 RX ORDER — SODIUM CHLORIDE 0.9 % (FLUSH) 0.9 %
5-40 SYRINGE (ML) INJECTION EVERY 8 HOURS
Status: DISCONTINUED | OUTPATIENT
Start: 2019-07-25 | End: 2019-07-31 | Stop reason: HOSPADM

## 2019-07-25 RX ORDER — ACETAMINOPHEN 325 MG/1
650 TABLET ORAL
Status: COMPLETED | OUTPATIENT
Start: 2019-07-25 | End: 2019-07-25

## 2019-07-25 RX ORDER — IPRATROPIUM BROMIDE AND ALBUTEROL SULFATE 2.5; .5 MG/3ML; MG/3ML
3 SOLUTION RESPIRATORY (INHALATION)
Status: DISCONTINUED | OUTPATIENT
Start: 2019-07-25 | End: 2019-07-31 | Stop reason: HOSPADM

## 2019-07-25 RX ORDER — LORAZEPAM 2 MG/ML
1 INJECTION INTRAMUSCULAR
Status: DISCONTINUED | OUTPATIENT
Start: 2019-07-25 | End: 2019-07-31 | Stop reason: HOSPADM

## 2019-07-25 RX ORDER — ACETAMINOPHEN 325 MG/1
650 TABLET ORAL
Status: DISCONTINUED | OUTPATIENT
Start: 2019-07-25 | End: 2019-07-31 | Stop reason: HOSPADM

## 2019-07-25 RX ORDER — DIAZEPAM 5 MG/1
2.5 TABLET ORAL
Status: ON HOLD | COMMUNITY
End: 2019-12-26 | Stop reason: SDUPTHER

## 2019-07-25 RX ORDER — ACETAMINOPHEN 325 MG/1
325 TABLET ORAL
Status: DISCONTINUED | OUTPATIENT
Start: 2019-07-25 | End: 2019-07-25

## 2019-07-25 RX ORDER — ONDANSETRON 2 MG/ML
4 INJECTION INTRAMUSCULAR; INTRAVENOUS
Status: COMPLETED | OUTPATIENT
Start: 2019-07-25 | End: 2019-07-25

## 2019-07-25 RX ORDER — ONDANSETRON 2 MG/ML
4 INJECTION INTRAMUSCULAR; INTRAVENOUS
Status: DISCONTINUED | OUTPATIENT
Start: 2019-07-25 | End: 2019-07-31 | Stop reason: HOSPADM

## 2019-07-25 RX ORDER — ENOXAPARIN SODIUM 100 MG/ML
40 INJECTION SUBCUTANEOUS EVERY 24 HOURS
Status: DISCONTINUED | OUTPATIENT
Start: 2019-07-25 | End: 2019-07-31 | Stop reason: HOSPADM

## 2019-07-25 RX ORDER — NALOXONE HYDROCHLORIDE 0.4 MG/ML
0.4 INJECTION, SOLUTION INTRAMUSCULAR; INTRAVENOUS; SUBCUTANEOUS AS NEEDED
Status: DISCONTINUED | OUTPATIENT
Start: 2019-07-25 | End: 2019-07-31 | Stop reason: HOSPADM

## 2019-07-25 RX ORDER — DIPHENHYDRAMINE HYDROCHLORIDE 50 MG/ML
12.5 INJECTION, SOLUTION INTRAMUSCULAR; INTRAVENOUS
Status: DISCONTINUED | OUTPATIENT
Start: 2019-07-25 | End: 2019-07-31 | Stop reason: HOSPADM

## 2019-07-25 RX ORDER — DIAZEPAM 10 MG/2ML
5 GEL RECTAL
Status: CANCELLED | OUTPATIENT
Start: 2019-07-25

## 2019-07-25 RX ORDER — SODIUM CHLORIDE 0.9 % (FLUSH) 0.9 %
5-40 SYRINGE (ML) INJECTION AS NEEDED
Status: DISCONTINUED | OUTPATIENT
Start: 2019-07-25 | End: 2019-07-31 | Stop reason: HOSPADM

## 2019-07-25 RX ADMIN — SODIUM CHLORIDE 1000 ML: 900 INJECTION, SOLUTION INTRAVENOUS at 19:40

## 2019-07-25 RX ADMIN — LACOSAMIDE 100 MG: 100 TABLET, FILM COATED ORAL at 23:14

## 2019-07-25 RX ADMIN — ACETAMINOPHEN 650 MG: 325 TABLET ORAL at 20:45

## 2019-07-25 RX ADMIN — ONDANSETRON 4 MG: 2 INJECTION INTRAMUSCULAR; INTRAVENOUS at 19:44

## 2019-07-25 RX ADMIN — ENOXAPARIN SODIUM 40 MG: 40 INJECTION SUBCUTANEOUS at 23:18

## 2019-07-25 RX ADMIN — IOPAMIDOL 100 ML: 755 INJECTION, SOLUTION INTRAVENOUS at 20:36

## 2019-07-25 RX ADMIN — WATER 2 G: 1 INJECTION INTRAMUSCULAR; INTRAVENOUS; SUBCUTANEOUS at 23:14

## 2019-07-25 RX ADMIN — DEXTROSE MONOHYDRATE AND SODIUM CHLORIDE 75 ML/HR: 5; .45 INJECTION, SOLUTION INTRAVENOUS at 23:14

## 2019-07-25 RX ADMIN — Medication 10 ML: at 23:15

## 2019-07-25 NOTE — ED TRIAGE NOTES
Pt's caregiver reported she noticed blood in urine starting today. Also reports vomiting 4 times today.

## 2019-07-25 NOTE — ED PROVIDER NOTES
I have evaluated the patient as the Provider in Triage. I have reviewed His vital signs and the triage nurse assessment. I have talked with the patient and any available family and advised that I am the provider in triage and have ordered the appropriate study to initiate their work up based on the clinical presentation during my assessment. I have advised that the patient will be accommodated in the Main ED as soon as possible. I have also requested to contact the triage nurse or myself immediately if the patient experiences any changes in their condition during this brief waiting period. Social hx: Never Smoker. Denies EtOH use. PCP: Michael Gray MD    Note written by Lars Hogan, as dictated by Chaim Diana MD 6:48 PM  ------------------------------------------------------------------------------------------------------------------------------------------      Sarah Chago Chong is a 30 y.o. male with past medical history significant for TBI, and seizures who presents from home with chief complaint of hematuria. Per caregiver, pt has had three episode of hematuria today with associated dysuria, nausea and vomiting x 4. Caregiver notes he has been eating and drinking normal, but had reflux today. Pt denies fever, or chills. There are no other acute medical concerns at this time. PCP: Michael Gray MD  PMHx significant for: TBI, Seizure Disorder  PSHx significant for: none reported  Social Hx: Tobacco: none EtOH: none Illicit drug use: none    There are no further complaints or symptoms at this time. Historian: Pt's family and caregiver of 5 years    Pt is a 29 yo M w/ Hx of TBI, seizures and recent ventriculoperitoneal shunt catheter to abdomen revision, presenting for hematuria. Per his caregiver, she noticed his urine was cloudy and foul smelling yesterday (7/24/19 @ 7am). Then this afternoon she noticed small red blood clots in his urine. Later this evening, he vomited 4x.  His meals today were oatmeal and [place] food. 2 days ago, he presented to the ED for a seizure episode. His parents endorse kidney stone found on abdominal CT about 4 months ago. He was only given his valium today. No denies fever/chills/ill-contacts/CP/palpitations/SOB. Clari Etienne MD    The history is provided by the patient, a caregiver, a parent and medical records. No  was used. Past Medical History:   Diagnosis Date    Neurological disorder     traumatic brain injury    Seizures ([facility] [address]        No past surgical history on file. No family history on file.     Social History     Socioeconomic History    Marital status: SINGLE     Spouse name: Not on file    Number of children: Not on file    Years of education: Not on file    Highest education level: Not on file   Occupational History    Not on file   Social Needs    Financial resource strain: Not on file    Food insecurity:     Worry: Not on file     Inability: Not on file    Transportation needs:     Medical: Not on file     Non-medical: Not on file   Tobacco Use    Smoking status: Never Smoker    Smokeless tobacco: Never Used   Substance and Sexual Activity    Alcohol use: No    Drug use: No    Sexual activity: Not on file   Lifestyle    Physical activity:     Days per week: Not on file     Minutes per session: Not on file    Stress: Not on file   Relationships    Social connections:     Talks on phone: Not on file     Gets together: Not on file     Attends [employer] service: Not on file     Active member of club or organization: Not on file     Attends meetings of clubs or organizations: Not on file     Relationship status: Not on file    Intimate partner violence:     Fear of current or ex partner: Not on file     Emotionally abused: Not on file     Physically abused: Not on file     Forced sexual activity: Not on file   Other Topics Concern    Not on file   Social History Narrative    Not on file ALLERGIES: Patient has no known allergies. Review of Systems   Constitutional: Negative for chills and fever. Respiratory: Negative for shortness of breath. Cardiovascular: Negative for chest pain. Gastrointestinal: Positive for nausea and vomiting. Negative for abdominal pain and diarrhea. Genitourinary: Positive for dysuria and hematuria. Neurological: Negative for numbness and headaches. All other systems reviewed and are negative. Vitals:    07/25/19 1853 07/25/19 2008   BP: 104/70    Pulse: (!) 125    Resp: 16    Temp: 99.7 °F (37.6 °C) (!) 100.8 °F (38.2 °C)   SpO2: 100%    Weight: 68 kg (150 lb)             Physical Exam   Constitutional:   History of TBI, baseline nonverbal, drooling with R arm and hand contracted to midline. HENT:   R sided defect from TBI. Neck: Neck supple. Cardiovascular: Regular rhythm, normal heart sounds and intact distal pulses. tachycardia   Pulmonary/Chest: Effort normal and breath sounds normal.   Abdominal: Soft. Bowel sounds are normal. He exhibits no distension. There is no tenderness. There is no guarding. Genitourinary: Penis normal.   Genitourinary Comments: Condom cath in place    Neurological: He is alert. Non verbal   Skin:   Well healing scar at posterior head from surgery.       Psychiatric: His behavior is normal.          CBC WITH AUTOMATED DIFF    Collection Time: 07/25/19  7:28 PM   Result Value Ref Range    WBC 16.7 (H) 4.1 - 11.1 K/uL    RBC 4.64 4.10 - 5.70 M/uL    HGB 11.0 (L) 12.1 - 17.0 g/dL    HCT 36.4 (L) 36.6 - 50.3 %    MCV 78.4 (L) 80.0 - 99.0 FL    MCH 23.7 (L) 26.0 - 34.0 PG    MCHC 30.2 30.0 - 36.5 g/dL    RDW 15.6 (H) 11.5 - 14.5 %    PLATELET 927 298 - 056 K/uL    MPV 10.7 8.9 - 12.9 FL    NRBC 0.0 0  WBC    ABSOLUTE NRBC 0.00 0.00 - 0.01 K/uL    NEUTROPHILS 85 (H) 32 - 75 %    LYMPHOCYTES 6 (L) 12 - 49 %    MONOCYTES 8 5 - 13 %    EOSINOPHILS 0 0 - 7 %    BASOPHILS 0 0 - 1 %    IMMATURE GRANULOCYTES 1 (H) 0.0 - 0.5 %    ABS. NEUTROPHILS 14.2 (H) 1.8 - 8.0 K/UL    ABS. LYMPHOCYTES 1.0 0.8 - 3.5 K/UL    ABS. MONOCYTES 1.3 (H) 0.0 - 1.0 K/UL    ABS. EOSINOPHILS 0.0 0.0 - 0.4 K/UL    ABS. BASOPHILS 0.1 0.0 - 0.1 K/UL    ABS. IMM. GRANS. 0.1 (H) 0.00 - 0.04 K/UL    DF AUTOMATED           METABOLIC PANEL, COMPREHENSIVE    Collection Time: 07/25/19  7:28 PM   Result Value Ref Range    Sodium 138 136 - 145 mmol/L    Potassium 4.2 3.5 - 5.1 mmol/L    Chloride 105 97 - 108 mmol/L    CO2 27 21 - 32 mmol/L    Anion gap 6 5 - 15 mmol/L    Glucose 94 65 - 100 mg/dL    BUN 17 6 - 20 MG/DL    Creatinine 0.72 0.70 - 1.30 MG/DL    BUN/Creatinine ratio 24 (H) 12 - 20      GFR est AA >60 >60 ml/min/1.73m2    GFR est non-AA >60 >60 ml/min/1.73m2    Calcium 9.6 8.5 - 10.1 MG/DL    Bilirubin, total 0.2 0.2 - 1.0 MG/DL    ALT (SGPT) 20 12 - 78 U/L    AST (SGOT) 16 15 - 37 U/L    Alk. phosphatase 72 45 - 117 U/L    Protein, total 8.4 (H) 6.4 - 8.2 g/dL    Albumin 3.4 (L) 3.5 - 5.0 g/dL    Globulin 5.0 (H) 2.0 - 4.0 g/dL    A-G Ratio 0.7 (L) 1.1 - 2.2         Lactate: nl 1.3    Lipase: nl 183    UA: cloudy, moderate blood,  WBC, small Leuk est    U cx: no growth    Blood cx: pending     CT abdomen and pelvis:     IMPRESSION:   1. Nonobstructing left renal calculi. 2. 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 Large collaterals  around the rectum, with hemorrhoids. 3.  shunt terminates in the right lower quadrant. No pelvic ascites, however. 4. Bladder wall thickening. 5. Moderate right pleural effusion, likely chronic, with round atelectasis in  the right middle and lower lobes. A/P: 29 yo M w/ Hx of TBI, seizures and recent ventriculoperitoneal shunt catheter to abdomen revision, presenting for hematuria, cloudy and foul smelling urine.        Kidney Stone- non-obstructive L renal calculi     Pyelonephritis vs UTI- UA dirty, WBC 16.7, rectal temp high    - ceftriaxone 1g q24 H        ED course: IVF bolus NS, Tylenol, ceftriaxone 1g q24 H            Dispo- Admitted for tx of UTI for IV Abx (ceftriaxone 1g q24H) and IV hydration. Pt's family understand and agree with plan.     ED Attending: Dr. Zahraa Montelongo MD            MDM  Number of Diagnoses or Management Options  Pyelonephritis:   Vomiting, intractability of vomiting not specified, presence of nausea not specified, unspecified vomiting type:      Amount and/or Complexity of Data Reviewed  Clinical lab tests: ordered and reviewed  Tests in the medicine section of CPT®: ordered and reviewed  Obtain history from someone other than the patient: yes  Discuss the patient with other providers: yes    Risk of Complications, Morbidity, and/or Mortality  Presenting problems: high  Diagnostic procedures: moderate  Management options: moderate           Procedures

## 2019-07-26 ENCOUNTER — APPOINTMENT (OUTPATIENT)
Dept: ULTRASOUND IMAGING | Age: 34
DRG: 698 | End: 2019-07-26
Attending: INTERNAL MEDICINE
Payer: MEDICARE

## 2019-07-26 LAB
ALBUMIN SERPL-MCNC: 2.9 G/DL (ref 3.5–5)
ALBUMIN/GLOB SERPL: 0.7 {RATIO} (ref 1.1–2.2)
ALP SERPL-CCNC: 63 U/L (ref 45–117)
ALT SERPL-CCNC: 17 U/L (ref 12–78)
ANION GAP SERPL CALC-SCNC: 3 MMOL/L (ref 5–15)
AST SERPL-CCNC: 12 U/L (ref 15–37)
BILIRUB DIRECT SERPL-MCNC: <0.1 MG/DL (ref 0–0.2)
BILIRUB SERPL-MCNC: 0.2 MG/DL (ref 0.2–1)
BUN SERPL-MCNC: 15 MG/DL (ref 6–20)
BUN/CREAT SERPL: 20 (ref 12–20)
CALCIUM SERPL-MCNC: 8.5 MG/DL (ref 8.5–10.1)
CHLORIDE SERPL-SCNC: 107 MMOL/L (ref 97–108)
CO2 SERPL-SCNC: 29 MMOL/L (ref 21–32)
CREAT SERPL-MCNC: 0.75 MG/DL (ref 0.7–1.3)
ERYTHROCYTE [DISTWIDTH] IN BLOOD BY AUTOMATED COUNT: 15.5 % (ref 11.5–14.5)
GLOBULIN SER CALC-MCNC: 4.2 G/DL (ref 2–4)
GLUCOSE SERPL-MCNC: 119 MG/DL (ref 65–100)
HCT VFR BLD AUTO: 30.9 % (ref 36.6–50.3)
HGB BLD-MCNC: 9.4 G/DL (ref 12.1–17)
MAGNESIUM SERPL-MCNC: 2 MG/DL (ref 1.6–2.4)
MCH RBC QN AUTO: 23.6 PG (ref 26–34)
MCHC RBC AUTO-ENTMCNC: 30.4 G/DL (ref 30–36.5)
MCV RBC AUTO: 77.6 FL (ref 80–99)
NRBC # BLD: 0 K/UL (ref 0–0.01)
NRBC BLD-RTO: 0 PER 100 WBC
PHOSPHATE SERPL-MCNC: 3.3 MG/DL (ref 2.6–4.7)
PLATELET # BLD AUTO: 148 K/UL (ref 150–400)
PMV BLD AUTO: 10.5 FL (ref 8.9–12.9)
POTASSIUM SERPL-SCNC: 3.6 MMOL/L (ref 3.5–5.1)
PROT SERPL-MCNC: 7.1 G/DL (ref 6.4–8.2)
RBC # BLD AUTO: 3.98 M/UL (ref 4.1–5.7)
SODIUM SERPL-SCNC: 139 MMOL/L (ref 136–145)
WBC # BLD AUTO: 14.1 K/UL (ref 4.1–11.1)

## 2019-07-26 PROCEDURE — 74011000258 HC RX REV CODE- 258: Performed by: INTERNAL MEDICINE

## 2019-07-26 PROCEDURE — 36415 COLL VENOUS BLD VENIPUNCTURE: CPT

## 2019-07-26 PROCEDURE — 74011250637 HC RX REV CODE- 250/637: Performed by: INTERNAL MEDICINE

## 2019-07-26 PROCEDURE — 74011250636 HC RX REV CODE- 250/636: Performed by: INTERNAL MEDICINE

## 2019-07-26 PROCEDURE — 65660000000 HC RM CCU STEPDOWN

## 2019-07-26 PROCEDURE — 97530 THERAPEUTIC ACTIVITIES: CPT

## 2019-07-26 PROCEDURE — 83735 ASSAY OF MAGNESIUM: CPT

## 2019-07-26 PROCEDURE — 85027 COMPLETE CBC AUTOMATED: CPT

## 2019-07-26 PROCEDURE — 80048 BASIC METABOLIC PNL TOTAL CA: CPT

## 2019-07-26 PROCEDURE — 94760 N-INVAS EAR/PLS OXIMETRY 1: CPT

## 2019-07-26 PROCEDURE — 76770 US EXAM ABDO BACK WALL COMP: CPT

## 2019-07-26 PROCEDURE — 97530 THERAPEUTIC ACTIVITIES: CPT | Performed by: OCCUPATIONAL THERAPIST

## 2019-07-26 PROCEDURE — 92610 EVALUATE SWALLOWING FUNCTION: CPT

## 2019-07-26 PROCEDURE — 84100 ASSAY OF PHOSPHORUS: CPT

## 2019-07-26 PROCEDURE — 80076 HEPATIC FUNCTION PANEL: CPT

## 2019-07-26 PROCEDURE — 97162 PT EVAL MOD COMPLEX 30 MIN: CPT

## 2019-07-26 PROCEDURE — 97165 OT EVAL LOW COMPLEX 30 MIN: CPT | Performed by: OCCUPATIONAL THERAPIST

## 2019-07-26 RX ORDER — DOCUSATE SODIUM 50 MG/5ML
100 LIQUID ORAL 2 TIMES DAILY
Status: DISCONTINUED | OUTPATIENT
Start: 2019-07-26 | End: 2019-07-31 | Stop reason: HOSPADM

## 2019-07-26 RX ORDER — IBUPROFEN 200 MG
600 TABLET ORAL
Status: DISCONTINUED | OUTPATIENT
Start: 2019-07-26 | End: 2019-07-31 | Stop reason: HOSPADM

## 2019-07-26 RX ORDER — IBUPROFEN 400 MG/1
800 TABLET ORAL ONCE
Status: COMPLETED | OUTPATIENT
Start: 2019-07-26 | End: 2019-07-26

## 2019-07-26 RX ADMIN — PHENYLEPHRINE HYDROCHLORIDE 30 MCG/MIN: 10 INJECTION INTRAVENOUS at 22:30

## 2019-07-26 RX ADMIN — ACETAMINOPHEN 650 MG: 325 TABLET ORAL at 05:49

## 2019-07-26 RX ADMIN — DOCUSATE SODIUM 100 MG: 100 CAPSULE, LIQUID FILLED ORAL at 09:02

## 2019-07-26 RX ADMIN — SODIUM CHLORIDE 1000 ML: 900 INJECTION, SOLUTION INTRAVENOUS at 22:04

## 2019-07-26 RX ADMIN — IBUPROFEN 800 MG: 400 TABLET ORAL at 15:00

## 2019-07-26 RX ADMIN — ACETAMINOPHEN 650 MG: 325 TABLET ORAL at 13:33

## 2019-07-26 RX ADMIN — DEXTROSE MONOHYDRATE AND SODIUM CHLORIDE 75 ML/HR: 5; .45 INJECTION, SOLUTION INTRAVENOUS at 12:38

## 2019-07-26 RX ADMIN — ENOXAPARIN SODIUM 40 MG: 40 INJECTION SUBCUTANEOUS at 20:19

## 2019-07-26 RX ADMIN — DOCUSATE SODIUM 100 MG: 50 LIQUID ORAL at 18:34

## 2019-07-26 RX ADMIN — SODIUM CHLORIDE 1000 ML: 900 INJECTION, SOLUTION INTRAVENOUS at 20:08

## 2019-07-26 RX ADMIN — Medication 10 ML: at 13:44

## 2019-07-26 RX ADMIN — LACOSAMIDE 100 MG: 100 TABLET, FILM COATED ORAL at 09:02

## 2019-07-26 RX ADMIN — LACOSAMIDE 100 MG: 100 TABLET, FILM COATED ORAL at 20:19

## 2019-07-26 RX ADMIN — CEFEPIME HYDROCHLORIDE 2 G: 2 INJECTION, POWDER, FOR SOLUTION INTRAVENOUS at 23:14

## 2019-07-26 NOTE — PROGRESS NOTES
Caregiver reports to RN that he was on thick liquids at home after some recent surgery. Diet changed to dysphagia 1, nectars.
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
## 2019-07-26 NOTE — ROUTINE PROCESS
TRANSFER - OUT REPORT:    Verbal report given to Inpatient RN(name) on Melvin Arias  being transferred to 5th floor(unit) for routine progression of care       Report consisted of patients Situation, Background, Assessment and   Recommendations(SBAR). Information from the following report(s) SBAR, ED Summary and MAR was reviewed with the receiving nurse. Lines:   Peripheral IV 07/25/19 Right Other(comment) (Active)   Site Assessment Clean, dry, & intact 7/25/2019  7:27 PM   Phlebitis Assessment 0 7/25/2019  7:27 PM   Infiltration Assessment 0 7/25/2019  7:27 PM   Dressing Status Clean, dry, & intact 7/25/2019  7:27 PM   Dressing Type Transparent;Tape 7/25/2019  7:27 PM   Hub Color/Line Status Pink;Flushed;Patent 7/25/2019  7:27 PM   Action Taken Blood drawn 7/25/2019  7:27 PM        Opportunity for questions and clarification was provided.       Patient transported with:   [employer]

## 2019-07-26 NOTE — PROGRESS NOTES
Interdisciplinary team rounds were held 7/26/2019 with the following team members:Care Management, Nursing, Nutrition and Physician.   Plan: awaiting culture results

## 2019-07-26 NOTE — ED NOTES
Hospitalist TigerText for Admission-Do  9:32 PM    ED Room Number: ER08/08  Patient Name and age:  Janelle Bailon 30 y.o.  male  Working Diagnosis:   1. Pyelonephritis    2.  Vomiting, intractability of vomiting not specified, presence of nausea not specified, unspecified vomiting type      Readmission: no  Isolation Requirements:  no  Recommended Level of Care:  med/surg  Code Status:  Full Code  Department:[facility] ED - [phone]

## 2019-07-26 NOTE — PROGRESS NOTES
Report given by Abida RN veified about the blood cx and stat due meds to be given, Rn is to verifiy with MD about the Blood cx, other report include dx and history.

## 2019-07-26 NOTE — PROGRESS NOTES
[facility]  [address] [facility], [place], [address] [place] [address]  [phone]      Medical Progress Note      NAME: Lesa Palencia   :  1985  MRM:  [id number]    Date/Time: 2019  8:11 AM       Assessment and Plan:   1. Sepsis/ RT pyelonephritis/ complicated UTI from catheter: sepsis POA, no severe sepsis. Infection likely due to chronic leonardo, renal stone (seen on CT). Check blood Cx, urine Cx, renal U/S. On IV CTX. Continue IVF     2. Hematuria: likely from renal stone. Consult Urology     3. N/V: Likely from UTI. Cont IVF, IV antiemetics     4. TBI: non-verbal, has  shunt. Lives with parents. Supportive care     5.  Seizure d/o: cont Vimpat     6. R pleural effusion: chronic per father. Will cont incentive spirometry, nebs prn               Subjective:     Chief Complaint:  Follow up of pt who was admitted with sepsis/ UTI. Pt pulled his IV. Nonverbal           Objective:     Last 24hrs VS reviewed since prior progress note.  Most recent are:    Visit Vitals  /58 (BP 1 Location: Right arm, BP Patient Position: At rest)   Pulse 90   Temp 98.2 °F (36.8 °C)   Resp 16   Wt 68 kg (150 lb)   SpO2 91%   BMI 19.26 kg/m²     SpO2 Readings from Last 6 Encounters:   19 91%   19 100%   18 99%   18 95%   18 91%   17 100%            Intake/Output Summary (Last 24 hours) at 2019 0811  Last data filed at 2019 0749  Gross per 24 hour   Intake 1391.25 ml   Output 750 ml   Net 641.25 ml        Physical Exam:    Gen:  Well-developed, well-nourished, in no acute distress  HEENT:  Pink conjunctivae, PERRL, hearing intact to voice, moist mucous membranes  Neck:  Supple, without masses, thyroid non-tender  Resp:  No accessory muscle use, clear breath sounds without wheezes rales or rhonchi  Card:  No murmurs, normal S1, S2 without thrills, bruits or peripheral edema  Abd:  Soft, non-tender, non-distended, normoactive bowel sounds are present, no palpable organomegaly and no detectable hernias  Lymph:  No cervical or inguinal adenopathy  Musc:  No cyanosis or clubbing  Skin:  No rashes or ulcers, skin turgor is good  Neuro:   Doesn't follow commands    Psych:  Nonverbal  __________________________________________________________________  Medications Reviewed: (see below)  Medications:     Current Facility-Administered Medications   Medication Dose Route Frequency    albuterol-ipratropium (DUO-NEB) 2.5 MG-0.5 MG/3 ML  3 mL Nebulization Q4H PRN    cefTRIAXone (ROCEPHIN) 2 g in 0.9% sodium chloride (MBP/ADV) 50 mL  2 g IntraVENous Q24H    lacosamide (VIMPAT) tablet 100 mg  100 mg Oral BID    dextrose 5 % - 0.45% NaCl infusion  75 mL/hr IntraVENous CONTINUOUS    sodium chloride (NS) flush 5-40 mL  5-40 mL IntraVENous Q8H    sodium chloride (NS) flush 5-40 mL  5-40 mL IntraVENous PRN    acetaminophen (TYLENOL) tablet 650 mg  650 mg Oral Q4H PRN    naloxone (NARCAN) injection 0.4 mg  0.4 mg IntraVENous PRN    diphenhydrAMINE (BENADRYL) injection 12.5 mg  12.5 mg IntraVENous Q4H PRN    ondansetron (ZOFRAN) injection 4 mg  4 mg IntraVENous Q6H PRN    docusate sodium (COLACE) capsule 100 mg  100 mg Oral BID    enoxaparin (LOVENOX) injection 40 mg  40 mg SubCUTAneous Q24H    LORazepam (ATIVAN) injection 1 mg  1 mg IntraVENous Q6H PRN        Lab Data Reviewed: (see below)  Lab Review:     Recent Labs     07/26/19 0216 07/25/19 1928 07/23/19  1554   WBC 14.1* 16.7* 9.7   HGB 9.4* 11.0* 10.5*   HCT 30.9* 36.4* 35.2*   * 192 234     Recent Labs     07/26/19 0216 07/25/19 1928 07/23/19  1554    138 140   K 3.6 4.2 4.1    105 107   CO2 29 27 29   * 94 95   BUN 15 17 17   CREA 0.75 0.72 0.68*   CA 8.5 9.6 8.8   MG 2.0  --   --    PHOS 3.3  --   --    ALB 2.9* 3.4* 3.2*   TBILI 0.2 0.2 0.2   SGOT 12* 16 14*   ALT 17 20 20     Lab Results   Component Value Date/Time    Glucose (POC) 83 07/23/2019 03:47 PM    Glucose (POC) PLEASE DISREGARD RESULTS 02/13/2017 11:58 PM    Glucose (POC) 95 02/13/2017 11:58 PM     No results for input(s): PH, PCO2, PO2, HCO3, FIO2 in the last 72 hours. No results for input(s): INR in the last 72 hours. No lab exists for component: INREXT  All Micro Results     Procedure Component Value Units Date/Time    CULTURE, BLOOD [[id number]] Collected:  07/25/19 2103    Order Status:  Completed Specimen:  Blood Updated:  07/26/19 0523     Special Requests: NO SPECIAL REQUESTS        Culture result: NO GROWTH AFTER 6 HOURS       CULTURE, URINE [[id number]] Collected:  07/25/19 1930    Order Status:  Completed Specimen:  Cath Urine Updated:  07/25/19 2158    CULTURE, BLOOD, PAIRED [[id number]]     Order Status:  Canceled Specimen:  Blood     URINE CULTURE HOLD SAMPLE [[id number]] Collected:  07/25/19 1900    Order Status:  Completed Specimen:  Urine from Serum Updated:  07/25/19 1952     Urine culture hold       URINE ON HOLD IN MICROBIOLOGY DEPT FOR 3 DAYS. IF UNPRESERVED URINE IS SUBMITTED, IT CANNOT BE USED FOR ADDITIONAL TESTING AFTER 24 HRS, RECOLLECTION WILL BE REQUIRED. I have reviewed notes of prior 24hr. Other pertinent lab:       Total time spent with patient: [place] [address] discussed with: Nursing Staff and >50% of time spent in counseling and coordination of care    Discussed:  Care Plan    Prophylaxis:  Lovenox    Disposition:  Home w/Family           ___________________________________________________    Attending Physician: Polo Graves MD
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
## 2019-07-26 NOTE — PROGRESS NOTES
Spiritual Care Partner Volunteer visited patient on the [facility]. Surgical  unit on 7/26/19. Documented by:  [employer] Stefano Haywood.      Paging Service: [phone])

## 2019-07-26 NOTE — PROGRESS NOTES
Current temperature of 102.7  Tylenol administered. Will re-assess and check temperature, and continue to monitor patient closely.   [address] recheck: 103. 6  Notified Dr. Lucy Caro. Ordered to administered Motrin 800mg PO ONCE.     1616  Temperature has improved: 99.3  Family and caregiver mentioned that Motrin works better for patient. However, due to his current diagnosis, Tylenol is the preferred anti-pyretic. If Tylenol doesn't work again, then [facility] can be given. Will pass it on to the night RN, and continue to monitor patient.
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
## 2019-07-26 NOTE — H&P
Elfego Jansen [facility] [address] [facility]  [address] [facility], [place], [address] [place] [address]  [phone]    Admission History and Physical      NAME:  Janelle Bailon   :   1985   MRN:  [id number]     PCP:  Jovita Lopez MD     Date/Time:  2019         Subjective:     CHIEF COMPLAINT: blood in urine, fevers     HISTORY OF PRESENT ILLNESS:     The patient is a 31 yo hx of TBI, non-verbal, -shunt, seizure d/o, presented w/ fevers, N/V, complicated UTI. The patient cannot give a history. His father stated that the patient vomited x4 today, associated with small blood in urine (patient uses condom cath), and fevers. Denied chest pain, SOB, syncope, diarrhea, or other neuro deficits. In the ED, WBC was 16.7. Abd CT showed non-obstruction L renal stone. No Known Allergies    Prior to Admission medications    Medication Sig Start Date End Date Taking? Authorizing Provider   lacosamide (VIMPAT) 100 mg tab tablet Take 1 Tab by mouth two (2) times a day. Max Daily Amount: 200 mg. 18   Hayley Andres, ALPHONSO   diazePAM (DIASTAT ACUDIAL) 5-7.5-10 mg kit Insert 5 mg into rectum as needed. Max Daily Amount: 480 mg. 18   Apolinar James PA       Past Medical History:   Diagnosis Date    Neurological disorder     traumatic brain injury    Seizures ([address]     TBI (traumatic brain injury) ([facility] [address]         No past surgical history on file. Social History     Tobacco Use    Smoking status: Never Smoker    Smokeless tobacco: Never Used   Substance Use Topics    Alcohol use: No        Family History   Problem Relation Age of Onset    No Known Problems Other         reviewed.   Patient does not know        Review of Systems: (unable to obtain due to non-verbal)  (bold if positive, if negative)    Gen:  Eyes:  ENT:  CVS:  Pulm:  GI:    :    MS:  Skin:  Psych:  Endo:    Hem:  Renal:    Neuro:          Objective:      VITALS:    Vital signs reviewed; most recent are:    Visit Vitals  /86   Pulse (!) 125 Temp (!) 100.8 °F (38.2 °C)   Resp 16   Wt 68 kg (150 lb)   SpO2 100%   BMI 19.26 kg/m²     SpO2 Readings from Last 6 Encounters:   07/25/19 100%   07/23/19 100%   08/07/18 99%   05/01/18 95%   05/01/18 91%   02/14/17 100%        No intake or output data in the 24 hours ending 07/25/19 2210     Exam:     Physical Exam:    Gen:  Disheveled, ill-appearing, NAD, non-verbal  HEENT:  Pink conjunctivae, PERRL, hearing intact to voice, temporal wasting  Neck:  Supple, without masses, thyroid non-tender  Resp:  No accessory muscle use, clear breath sounds without wheezes rales or rhonchi  Card:  No murmurs, normal S1, S2 without thrills, bruits or peripheral edema  Abd:  Soft, non-tender, non-distended, normoactive bowel sounds are present  Lymph:  No cervical adenopathy  Musc:  No cyanosis or clubbing  Skin:  No rashes   Neuro:  Unable to follow commands  Psych:  No insight    Labs:    Recent Labs     07/25/19 1928   WBC 16.7*   HGB 11.0*   HCT 36.4*        Recent Labs     07/25/19 1928      K 4.2      CO2 27   GLU 94   BUN 17   CREA 0.72   CA 9.6   ALB 3.4*   TBILI 0.2   SGOT 16   ALT 20     Lab Results   Component Value Date/Time    Glucose (POC) 83 07/23/2019 03:47 PM    Glucose (POC) PLEASE DISREGARD RESULTS 02/13/2017 11:58 PM    Glucose (POC) 95 02/13/2017 11:58 PM     No results for input(s): PH, PCO2, PO2, HCO3, FIO2 in the last 72 hours. No results for input(s): INR in the last 72 hours. No lab exists for component: INREXT    Radiology and EKG reviewed:   abd CT reviewed    **Old Records reviewed in [facility]**       Assessment/Plan:       Principal Problem:    29 yo hx of TBI, non-verbal, -shunt, seizure d/o, presented w/ fevers, N/V, complicated UTI    1) Sepsis/Pyelonephritis/complicated UTI from catheter: sepsis POA, no severe sepsis. Infection likely due to chronic leonardo, renal stone (seen on CT). Will check blood Cx, urine Cx, renal U/S.   Start IV CTX    2) Hematuria: likely from renal stone. Will consult Urology    3) N/V: now resolved. Likely from UTI. Cont IVF, IV antiemetics    4) TBI: non-verbal, has  shunt. Lives with parents. Cont diazepam prn    5) Seizure d/o: cont Vimpat    6) R pleural effusion: chronic per father. Will cont incentive spirometry, nebs prn    Code: Full.   Mother is POA    Risk of deterioration: high      Total time spent with patient: [phone] discussed with: Patient, family, nursing    Discussed:  Care Plan    Prophylaxis:  Lovenox    Probable Disposition:   PT, OT, RN           ___________________________________________________    Attending Physician: Tristan Salazar MD

## 2019-07-26 NOTE — ACP (ADVANCE CARE PLANNING)
Advance Care Planning (ACP) Provider Note - Comprehensive     Date of ACP Conversation: 07/25/19   Diagnosis: sepsis  Persons included in Conversation:  patient and family  Length of ACP Conversation in minutes:  16 minutes    Authorized Decision Maker (if patient is incapable of making informed decisions): This person is:  father            General ACP for ALL Patients with Decision Making Capacity:   Exploration of values, goals, and preferences if recovery is not expected, even with continued medical treatment in the event of: Imminent death    Review of Existing Advance Directive:  has advanced directives. Mother is POA. family wants full code. goal is to return home    For Serious or Chronic Illness:  Understanding of CPR, goals and expected outcomes, benefits and burdens discussed.     Interventions Provided:  Recommended communicating the plan and making copies for the healthcare agent, personal physician, and others as appropriate (e.g., health system)

## 2019-07-26 NOTE — PROGRESS NOTES
Problem: Dysphagia (Adult)  Goal: *Acute Goals and Plan of Care (Insert Text)  Description  Swallowing goals initiated 7-26-19:  1) tolerate dysphagia 1, thins without s/s aspiration by 7-29-19  2) re-eval for solids once 24 hour caregivers present   Outcome: Progressing Towards Goal   SPEECH LANGUAGE PATHOLOGY BEDSIDE SWALLOW EVALUATION  Patient: May Alexis (38 y.o. male)  Date: 7/26/2019  Primary Diagnosis: Pyelonephritis [N12]        Precautions:   Fall, Seizure, Skin    ASSESSMENT :  Based on the objective data described below, the patient presents with moderate oral dysphagia and a degree of pharyngeal dysphagia. Moderate aspiration risk factors; see below. Admitted with sepsis UTI    PMH: nonverbal s/p TBI with  shunt, seizures. recent shunt revision. .    Patient will benefit from skilled intervention to address the above impairments. Patients rehabilitation potential is considered to be Fair  Factors which may influence rehabilitation potential include:   ? None noted  ? Mental ability/status  ? Medical condition  ? Home/family situation and support systems  ? Safety awareness  ? Pain tolerance/management  ? Other:      PLAN :  Recommendations and Planned Interventions:  Downgrade to dysphagia 1, thins. Feed only when awake and alert. Oral care after PO  Frequency/Duration: Patient will be followed by speech-language pathology 2 times a week to address goals. Discharge Recommendations: To Be Determined     SUBJECTIVE:   Patient's  present. He is not alllowed to feed pateint . He usually has caregivers or family feed him. Uses built-up fork to feed self occasionally. He tends to eat impulsively per .     OBJECTIVE:     Past Medical History:   Diagnosis Date    Neurological disorder     traumatic brain injury    Seizures ([facility] [address]     TBI (traumatic brain injury) ([facility] [address]    No past surgical history on file.  Prior Level of Function/Home Situation:   Home Situation  Home Environment: Private residence  # Steps to Enter: 0  One/Two Story Residence: Two story, live on 1st floor  Living Alone: No  Support Systems: Family member(s), Parent, Home care staff(has aides 14 hours day, 7 days/week)  Patient Expects to be Discharged to[de-identified] Private residence  Current DME Used/Available at Home: Wheelchair, Hospital bed, Shower chair, Grab bars, Commode, bedside  Tub or Shower Type: Shower  Diet prior to admission: regular, thins  Current Diet:  dysphagia 2, thins-famly chopps up food for him. Cognitive and Communication Status:  Neurologic State: Alert  Orientation Level: Unable to verbalize  Cognition: Follows commands  Perception: Cues to maintain midline in sitting, Cues to maintain midline in standing, Tactile, Verbal, Visual  Perseveration: No perseveration noted  Safety/Judgement: Not assessed(due to impaired communication)  Oral Assessment:     P.O. Trials:  Patient Position: upright in chair  Vocal quality prior to P.O.: (impaired)  Consistency Presented: Solid; Thin liquid  How Presented: SLP-fed/presented;Straw      ORAL PHASE:       HEAD tilted to L from TMI-premorbid  Max oral leakage and residue with solids  Reduced chew with vertical chew instead of rotary      Difficulty using straw consistently, but eventually took large and multiple sips    PHARYNGEAL PHASE:   Fair to good strength of swallow. Timing WFL. No immediate overt s/s, but several minutes after cracker, he had some coughing. Uncertain if he was coughing on residual cracker particles from mouth or throat.  (not caregiver) reports that he has bad reflux at times. This patient has moderate aspiration risks due to   1) position -kyphosis  2)  oral dysphagia with reduced clearance of oral residue and cough  3) feeder status   4) dependence for oral  care   5) reflux.        He may be at baseline function for his swallowing, but it not in his chair, his bed or his eating situation with his special fork and caregivers. No CXR  in this admission. NOMS:   The NOMS functional outcome measure was used to quantify this patient's level of swallowing impairment. Based on the NOMS, the patient was determined to be at level 4 for swallow function       NOMS Swallowing Levels:  Level 1 (CN): NPO  Level 2 (CM): NPO but takes consistency in therapy  Level 3 (CL): Takes less than 50% of nutrition p.o. and continues with nonoral feedings; and/or safe with mod cues; and/or max diet restriction  Level 4 (CK): Safe swallow but needs mod cues; and/or mod diet restriction; and/or still requires some nonoral feeding/supplements  Level 5 (CJ): Safe swallow with min diet restriction; and/or needs min cues  Level 6 (CI): Independent with p.o.; rare cues; usually self cues; may need to avoid some foods or needs extra time  Level 7 ([address] [place] [address]): Independent for all p.o.  CARINA. (2003). National Outcomes Measurement System (NOMS): Adult Speech-Language Pathology User's Guide. Pain:  Pain Scale 1: Numeric (0 - 10)  Pain Intensity 1: 0     After treatment:   ?            Patient left in no apparent distress sitting up in chair  ? Patient left in no apparent distress in bed  ? Call bell left within reach  ? Nursing notified  ? Caregiver present  ? Bed alarm activated    COMMUNICATION/EDUCATION:   The patients plan of care including recommendations, planned interventions, and recommended diet changes were discussed with: Registered Nurse and llife , MD .    Patient was educated regarding His deficit(s) of dysphagia  as this relates to His diagnosis of sepsis. He demonstrated Guarded understanding as evidenced by TBI. [facility] understood. .  ?            Posted safety precautions in patient's room. ?             Patient/family have participated as able in goal setting and plan of care.  ?            Patient/family agree to work toward stated goals and plan of care. ?            Patient understands intent and goals of therapy, but is neutral about his/her participation. ? Patient is unable to participate in goal setting and plan of care.     Thank you for this referral.  CATHLEEN Will  Time Calculation: 15 mins
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
## 2019-07-26 NOTE — PROGRESS NOTES
Bedside and Verbal shift change report given to [address] [place] [address]  (oncoming nurse) by Marylin Brandon RN  (offgoing nurse). Report included the following information SBAR, Kardex, Intake/Output, MAR and Recent Results.

## 2019-07-26 NOTE — PROGRESS NOTES
[facility]: MED RECONCILIATION    Comments/Recommendations:   Medication reconciliation completed by father. Patient only had AM dose of Vimpat this morning. Confirmed NKDA and preferred pharmacy as [facility]. Medications added:     Diazepam     Medications removed:    Diastat rectal diazepam    Medications adjusted:    none    Information obtained from: father, Rx query, [place]     Allergies: Patient has no known allergies. Prior to Admission Medications:     Prior to Admission Medications   Prescriptions Last Dose Informant Patient Reported? Taking?   diazePAM (VALIUM) 5 mg tablet  Parent Yes Yes   Sig: Take 5 mg by mouth two (2) times daily as needed. lacosamide (VIMPAT) 100 mg tab tablet 7/25/2019 at AM Parent No Yes   Sig: Take 1 Tab by mouth two (2) times a day. Max Daily Amount: 200 mg.       Facility-Administered Medications: None             Thank You,   Mavis Maldonado, PharmD, BCPS   Contact: [phone]

## 2019-07-27 ENCOUNTER — APPOINTMENT (OUTPATIENT)
Dept: GENERAL RADIOLOGY | Age: 34
DRG: 698 | End: 2019-07-27
Attending: INTERNAL MEDICINE
Payer: MEDICARE

## 2019-07-27 PROBLEM — A41.9 SEPSIS (HCC): Status: RESOLVED | Noted: 2019-07-25 | Resolved: 2019-07-27

## 2019-07-27 PROBLEM — R65.21 SEPTIC SHOCK (HCC): Status: ACTIVE | Noted: 2019-07-27

## 2019-07-27 PROBLEM — A41.9 SEPTIC SHOCK (HCC): Status: ACTIVE | Noted: 2019-07-27

## 2019-07-27 LAB
ANION GAP SERPL CALC-SCNC: 6 MMOL/L (ref 5–15)
BASOPHILS # BLD: 0 K/UL (ref 0–0.1)
BASOPHILS NFR BLD: 0 % (ref 0–1)
BUN SERPL-MCNC: 15 MG/DL (ref 6–20)
BUN/CREAT SERPL: 26 (ref 12–20)
CALCIUM SERPL-MCNC: 7.4 MG/DL (ref 8.5–10.1)
CHLORIDE SERPL-SCNC: 110 MMOL/L (ref 97–108)
CO2 SERPL-SCNC: 23 MMOL/L (ref 21–32)
CREAT SERPL-MCNC: 0.57 MG/DL (ref 0.7–1.3)
DIFFERENTIAL METHOD BLD: ABNORMAL
EOSINOPHIL # BLD: 0.1 K/UL (ref 0–0.4)
EOSINOPHIL NFR BLD: 0 % (ref 0–7)
ERYTHROCYTE [DISTWIDTH] IN BLOOD BY AUTOMATED COUNT: 15.6 % (ref 11.5–14.5)
GLUCOSE SERPL-MCNC: 139 MG/DL (ref 65–100)
HCT VFR BLD AUTO: 31.6 % (ref 36.6–50.3)
HGB BLD-MCNC: 9.5 G/DL (ref 12.1–17)
IMM GRANULOCYTES # BLD AUTO: 0.2 K/UL (ref 0–0.04)
IMM GRANULOCYTES NFR BLD AUTO: 1 % (ref 0–0.5)
LACTATE SERPL-SCNC: 1.6 MMOL/L (ref 0.4–2)
LYMPHOCYTES # BLD: 1 K/UL (ref 0.8–3.5)
LYMPHOCYTES NFR BLD: 6 % (ref 12–49)
MCH RBC QN AUTO: 24.1 PG (ref 26–34)
MCHC RBC AUTO-ENTMCNC: 30.1 G/DL (ref 30–36.5)
MCV RBC AUTO: 80 FL (ref 80–99)
MONOCYTES # BLD: 1.4 K/UL (ref 0–1)
MONOCYTES NFR BLD: 10 % (ref 5–13)
NEUTS SEG # BLD: 12.1 K/UL (ref 1.8–8)
NEUTS SEG NFR BLD: 82 % (ref 32–75)
NRBC # BLD: 0 K/UL (ref 0–0.01)
NRBC BLD-RTO: 0 PER 100 WBC
PLATELET # BLD AUTO: 123 K/UL (ref 150–400)
PMV BLD AUTO: 9.9 FL (ref 8.9–12.9)
POTASSIUM SERPL-SCNC: 3.6 MMOL/L (ref 3.5–5.1)
RBC # BLD AUTO: 3.95 M/UL (ref 4.1–5.7)
SODIUM SERPL-SCNC: 139 MMOL/L (ref 136–145)
WBC # BLD AUTO: 14.8 K/UL (ref 4.1–11.1)

## 2019-07-27 PROCEDURE — 74011000250 HC RX REV CODE- 250

## 2019-07-27 PROCEDURE — 74011250637 HC RX REV CODE- 250/637: Performed by: INTERNAL MEDICINE

## 2019-07-27 PROCEDURE — 74011250636 HC RX REV CODE- 250/636: Performed by: INTERNAL MEDICINE

## 2019-07-27 PROCEDURE — 74018 RADEX ABDOMEN 1 VIEW: CPT

## 2019-07-27 PROCEDURE — 93005 ELECTROCARDIOGRAM TRACING: CPT

## 2019-07-27 PROCEDURE — 80048 BASIC METABOLIC PNL TOTAL CA: CPT

## 2019-07-27 PROCEDURE — 77030019940 HC BLNKT HYPOTHRM STRY -B

## 2019-07-27 PROCEDURE — 85025 COMPLETE CBC W/AUTO DIFF WBC: CPT

## 2019-07-27 PROCEDURE — 36415 COLL VENOUS BLD VENIPUNCTURE: CPT

## 2019-07-27 PROCEDURE — C9113 INJ PANTOPRAZOLE SODIUM, VIA: HCPCS | Performed by: INTERNAL MEDICINE

## 2019-07-27 PROCEDURE — 74011000258 HC RX REV CODE- 258: Performed by: INTERNAL MEDICINE

## 2019-07-27 PROCEDURE — 65660000000 HC RM CCU STEPDOWN

## 2019-07-27 PROCEDURE — 83605 ASSAY OF LACTIC ACID: CPT

## 2019-07-27 PROCEDURE — 74011000250 HC RX REV CODE- 250: Performed by: INTERNAL MEDICINE

## 2019-07-27 RX ORDER — METOPROLOL TARTRATE 5 MG/5ML
INJECTION INTRAVENOUS
Status: COMPLETED
Start: 2019-07-27 | End: 2019-07-27

## 2019-07-27 RX ORDER — METOPROLOL TARTRATE 5 MG/5ML
5 INJECTION INTRAVENOUS ONCE
Status: COMPLETED | OUTPATIENT
Start: 2019-07-27 | End: 2019-07-27

## 2019-07-27 RX ORDER — FACIAL-BODY WIPES
10 EACH TOPICAL EVERY OTHER DAY
Status: DISCONTINUED | OUTPATIENT
Start: 2019-07-27 | End: 2019-07-31 | Stop reason: HOSPADM

## 2019-07-27 RX ORDER — PROCHLORPERAZINE EDISYLATE 5 MG/ML
10 INJECTION INTRAMUSCULAR; INTRAVENOUS
Status: DISCONTINUED | OUTPATIENT
Start: 2019-07-27 | End: 2019-07-31 | Stop reason: HOSPADM

## 2019-07-27 RX ORDER — SODIUM CHLORIDE 9 MG/ML
150 INJECTION, SOLUTION INTRAVENOUS CONTINUOUS
Status: DISPENSED | OUTPATIENT
Start: 2019-07-27 | End: 2019-07-28

## 2019-07-27 RX ADMIN — METOPROLOL TARTRATE 5 MG: 5 INJECTION INTRAVENOUS at 13:31

## 2019-07-27 RX ADMIN — SODIUM CHLORIDE 125 ML/HR: 900 INJECTION, SOLUTION INTRAVENOUS at 03:13

## 2019-07-27 RX ADMIN — Medication 20 ML: at 22:30

## 2019-07-27 RX ADMIN — SODIUM CHLORIDE 150 ML/HR: 900 INJECTION, SOLUTION INTRAVENOUS at 17:12

## 2019-07-27 RX ADMIN — ONDANSETRON 4 MG: 2 INJECTION INTRAMUSCULAR; INTRAVENOUS at 17:46

## 2019-07-27 RX ADMIN — DOCUSATE SODIUM 100 MG: 50 LIQUID ORAL at 08:09

## 2019-07-27 RX ADMIN — Medication 10 ML: at 14:38

## 2019-07-27 RX ADMIN — PANTOPRAZOLE SODIUM 40 MG: 40 INJECTION, POWDER, FOR SOLUTION INTRAVENOUS at 22:44

## 2019-07-27 RX ADMIN — BISACODYL 10 MG: 10 SUPPOSITORY RECTAL at 12:09

## 2019-07-27 RX ADMIN — LACOSAMIDE 100 MG: 100 TABLET, FILM COATED ORAL at 08:09

## 2019-07-27 RX ADMIN — CEFEPIME HYDROCHLORIDE 2 G: 2 INJECTION, POWDER, FOR SOLUTION INTRAVENOUS at 14:38

## 2019-07-27 RX ADMIN — IBUPROFEN 600 MG: 600 TABLET ORAL at 17:46

## 2019-07-27 RX ADMIN — CEFEPIME HYDROCHLORIDE 2 G: 2 INJECTION, POWDER, FOR SOLUTION INTRAVENOUS at 07:58

## 2019-07-27 RX ADMIN — SODIUM CHLORIDE 125 ML/HR: 900 INJECTION, SOLUTION INTRAVENOUS at 09:57

## 2019-07-27 RX ADMIN — METOPROLOL TARTRATE 5 MG: 5 INJECTION INTRAVENOUS at 19:05

## 2019-07-27 RX ADMIN — ACETAMINOPHEN 650 MG: 325 TABLET ORAL at 12:19

## 2019-07-27 RX ADMIN — LACOSAMIDE 100 MG: 100 TABLET, FILM COATED ORAL at 22:41

## 2019-07-27 RX ADMIN — DOCUSATE SODIUM 100 MG: 50 LIQUID ORAL at 17:46

## 2019-07-27 RX ADMIN — CEFEPIME HYDROCHLORIDE 2 G: 2 INJECTION, POWDER, FOR SOLUTION INTRAVENOUS at 22:28

## 2019-07-27 NOTE — PROGRESS NOTES
Patient requires the use of non-violent medical restraints due to:    -X- A medical device was removed or removal was attempted   -- Is confused, disoriented, agitated, fearful or hallucinating as a result of a medical or post-surgical condition, where alternative methods have failed to protect the patient from injury or harm.    -- Has the inability to comprehend or follow through with safety instructions as a result of a medical or post-surgical condition and where alternatives have failed to protect from injury or harm   -- Is incapacitated due to drugs or alcohol

## 2019-07-27 NOTE — PROGRESS NOTES
1955: Contacted Dr. Tristian Valente regarding patient's BP; order for 1,000 ml bolus received.     Patient Vitals for the past 4 hrs:   Temp Pulse Resp BP SpO2   07/26/19 1949  77  (!) 73/48    07/26/19 1938 97.9 °F (36.6 °C) 99 16 (!) 79/44 93 %

## 2019-07-27 NOTE — PROGRESS NOTES
2222 Patient arrived to SD from 5th floor. Patient transferring to get Felton drip for hypotension. Pt also in the process of getting a 1000 bolus of NS from 5th floor nurse. Report given at bedside. Patient Felton started. Patient is a difficult stick will send labs when able to get draw. Patient incontinent. Placed condom cath on arrival. Patient afebrile. Patient contracted. Per report this is his baseline. 0130 Able to place 2 IV's patient is an extremely hard stick and attempts to pull out IV. Labs sent. Primary Nurse Deana Sunshine, MELVIN and MELVIN Sheehan, RN performed a dual skin assessment on this patient No impairment noted  Bhupinder score is 13

## 2019-07-27 NOTE — PROGRESS NOTES
Elfego Jansen [facility]  [address] [facility], [place], [address] [place] [address]  [phone]      Medical Progress Note      NAME: Yani Rangel   :  1985  MRM:  [id number]    Date/Time: 2019  8:11 AM       Assessment and Plan:   1. Sepsis/ RT pyelonephritis/ complicated UTI from catheter: sepsis POA. Infection likely due to chronic leonardo, renal stone (seen on CT). Check blood Cx, urine Cx, renal U/S. On IV cefepime. Continue IVF    2. Septic shock. Likely due to above. Started on taniya( weaning). ABx changed to cefepime.      3.  Hematuria: likely from renal stone. Consult Urology     4.  N/V: Likely from UTI. Cont IVF, IV antiemetics     5. TBI: non-verbal, has  shunt. Lives with parents. Supportive care     6.  Seizure d/o: cont Vimpat     7. R pleural effusion: chronic per father. nebs prn               Subjective:     Chief Complaint:  Follow up of pt who was admitted with sepsis/ UTI. Nonverbal. Became hypotensive and transferred to ICU          Objective:     Last 24hrs VS reviewed since prior progress note.  Most recent are:    Visit Vitals  /88   Pulse (!) 112   Temp 97.8 °F (36.6 °C)   Resp 23   Wt 68 kg (150 lb)   SpO2 98%   BMI 19.26 kg/m²     SpO2 Readings from Last 6 Encounters:   19 98%   19 100%   18 99%   18 95%   18 91%   17 100%            Intake/Output Summary (Last 24 hours) at 2019 1016  Last data filed at 2019 0900  Gross per 24 hour   Intake 3159.96 ml   Output 900 ml   Net 2259.96 ml        Physical Exam:    Gen:  Well-developed, well-nourished, in no acute distress  HEENT:  Pink conjunctivae, PERRL, hearing intact to voice, moist mucous membranes  Neck:  Supple, without masses, thyroid non-tender  Resp:  No accessory muscle use, clear breath sounds without wheezes rales or rhonchi  Card:  No murmurs, normal S1, S2 without thrills, bruits or peripheral edema  Abd:  Soft, non-tender, non-distended, normoactive bowel sounds are present, no palpable organomegaly and no detectable hernias  Lymph:  No cervical or inguinal adenopathy  Musc:  No cyanosis or clubbing  Skin:  No rashes or ulcers, skin turgor is good  Neuro:   Doesn't follow commands    Psych:  Nonverbal  __________________________________________________________________  Medications Reviewed: (see below)  Medications:     Current Facility-Administered Medications   Medication Dose Route Frequency    0.9% sodium chloride infusion  125 mL/hr IntraVENous CONTINUOUS    docusate (COLACE) 50 mg/5 mL oral liquid 100 mg  100 mg Oral BID    ibuprofen (MOTRIN) tablet 600 mg  600 mg Oral Q6H PRN    PHENYLephrine (JAMIE-SYNEPHRINE) 30 mg in 0.9% sodium chloride 250 mL infusion   mcg/min IntraVENous TITRATE    cefepime (MAXIPIME) 2 g in 0.9% sodium chloride (MBP/ADV) 100 mL  2 g IntraVENous Q8H    albuterol-ipratropium (DUO-NEB) 2.5 MG-0.5 MG/3 ML  3 mL Nebulization Q4H PRN    lacosamide (VIMPAT) tablet 100 mg  100 mg Oral BID    sodium chloride (NS) flush 5-40 mL  5-40 mL IntraVENous Q8H    sodium chloride (NS) flush 5-40 mL  5-40 mL IntraVENous PRN    acetaminophen (TYLENOL) tablet 650 mg  650 mg Oral Q4H PRN    naloxone (NARCAN) injection 0.4 mg  0.4 mg IntraVENous PRN    diphenhydrAMINE (BENADRYL) injection 12.5 mg  12.5 mg IntraVENous Q4H PRN    ondansetron (ZOFRAN) injection 4 mg  4 mg IntraVENous Q6H PRN    enoxaparin (LOVENOX) injection 40 mg  40 mg SubCUTAneous Q24H    LORazepam (ATIVAN) injection 1 mg  1 mg IntraVENous Q6H PRN        Lab Data Reviewed: (see below)  Lab Review:     Recent Labs     07/27/19  0015 07/26/19  0216 07/25/19  1928   WBC 14.8* 14.1* 16.7*   HGB 9.5* 9.4* 11.0*   HCT 31.6* 30.9* 36.4*   * 148* 192     Recent Labs     07/27/19  0015 07/26/19  0216 07/25/19 1928    139 138   K 3.6 3.6 4.2   * 107 105   CO2 23 29 27   * 119* 94   BUN 15 15 17   CREA 0.57* 0.75 0.72   CA 7.4* 8.5 9.6   MG  --  2.0 --    PHOS  --  3.3  --    ALB  --  2.9* 3.4*   TBILI  --  0.2 0.2   SGOT  --  12* 16   ALT  --  17 20     Lab Results   Component Value Date/Time    Glucose (POC) 83 07/23/2019 03:47 PM    Glucose (POC) PLEASE DISREGARD RESULTS 02/13/2017 11:58 PM    Glucose (POC) 95 02/13/2017 11:58 PM     No results for input(s): PH, PCO2, PO2, HCO3, FIO2 in the last 72 hours. No results for input(s): INR in the last 72 hours. No lab exists for component: INREXT, INREXT  All Micro Results     Procedure Component Value Units Date/Time    CULTURE, URINE [[id number]]  (Abnormal) Collected:  07/25/19 1930    Order Status:  Completed Specimen:  Cath Urine Updated:  07/27/19 0942     Special Requests: NO SPECIAL REQUESTS        [place] Count --        [id number]  COLONIES/mL       Culture result: PROBABLE PROTEUS SPECIES       CULTURE, BLOOD [[id number]] Collected:  07/25/19 2103    Order Status:  Completed Specimen:  Blood Updated:  07/27/19 0649     Special Requests: NO SPECIAL REQUESTS        Culture result: NO GROWTH 2 DAYS       CULTURE, BLOOD, PAIRED [[id number]]     Order Status:  Canceled Specimen:  Blood     URINE CULTURE HOLD SAMPLE [[id number]] Collected:  07/25/19 1900    Order Status:  Completed Specimen:  Urine from Serum Updated:  07/25/19 1952     Urine culture hold       URINE ON HOLD IN MICROBIOLOGY DEPT FOR 3 DAYS. IF UNPRESERVED URINE IS SUBMITTED, IT CANNOT BE USED FOR ADDITIONAL TESTING AFTER 24 HRS, RECOLLECTION WILL BE REQUIRED. I have reviewed notes of prior 24hr. Other pertinent lab:       Total time spent with patient: [place] [address] discussed with: Nursing Staff and >50% of time spent in counseling and coordination of care    Discussed:  Care Plan    Prophylaxis:  Lovenox    Disposition:  Home w/Family           ___________________________________________________    Attending Physician: Deneen Yu MD

## 2019-07-27 NOTE — PROGRESS NOTES
Patient pulling at restraints hard enough to bruise wrist.  MD made aware and restraints discontinued.

## 2019-07-27 NOTE — PROGRESS NOTES
1933 Received report. 2244 Patient vomiting brown liquid , informed  ,lovenox held, protonix 40 mg iv started. 0300 Patient resting, no vomiting. 0710 Bedside shift change report given to MELVIN Grayson .  Report included the following information SBAR, Kardex, ED Summary, Intake/Output, MAR, Accordion, Recent Results, Med Rec Status and Cardiac Rhythm ST.

## 2019-07-27 NOTE — PROGRESS NOTES
Assisting primary RN after multiple IV insertion and lab draw attempts, PIVs inserted left thumb 3nd attempt and right index finder 1st attempt and labs drawn.

## 2019-07-27 NOTE — ROUTINE PROCESS
0700 Shift change report received from Luis Campoverde [place]. SBAR, Kardex, Procedure Summary, Intake/Output, MAR, Accordion, Recent Results and Cardiac Rhythm SR reviewed. Primary Nurse Kristel Stauffer and Luis Campoverde RN performed a dual skin assessment on this patient No impairment noted  Bhupinder score is 13    1900  Bedside report given to MELVIN Rhodes. SBAR, Kardex, Procedure Summary, Intake/Output, MAR, Accordion, Recent Results and Cardiac Rhythm SR/ST reviewed. Patient is stable at this time. Call light within reach, bed alarm on, bed in low position.

## 2019-07-27 NOTE — PROGRESS NOTES
Shift Summary    0720: Bedside report done. Patient resting in bed. He has a mitt to his left hand to keep him from pulling out IVs.  No mitt to right hand. Felton at 35. NS at 125. No verbal response from patient. No signs of distress noted. Condom cath in place and draining to leonardo bag.    0745:  Charge nurse at the bedside to get another IV. Patient pulled IV in right finger. 0800:  Patient assessed and medications given. Spoke with his mother on the phone. Update given. 0900:  Assisted to turn and clean patient. Patient bladder scanned by tech. He has an indwelling catheter at home due to neurogenic bladder. Patient scanned for 250. He voided during scan. Post void bladder scan 0.    1000:  Patient in bed, restless. He keeps trying to pull out IVs.  Education done. No evidence of learning. 1100:  Family at the bedside updated. 1200:  Patient assessed. Dulcolax suppository given as ordered. 1227:  Dr. Boris Sahni called. Patient's temp 102.8 rectal.  Per Dr. Treasure Carmona worked better for his fever than tylenol. Tylenol already given. Will wait a few hours and do motrin if needed. Order obtained for restraint due to patient continuing to try and removed IVs.    1229:  Wrist restraint applied to left wrist to keep patient from pulling IVs.    1314:  HR up in the 160s-170s. Dr. Boris Sahni called. Order give for one time IV metoprolol. Increase IV fluids to 150.  12 lead EKG. 1332: Attempted to do 12 lead. Patient will not stay still and will not follow directions. Second nurse at the bedside to assist as patient trying to pull off 12 lead wires    1354:  HR down after IV metoprolol. Cooling blanket applied to assist with fever. Will continue to monitor. 1409: Palliative order noted from Dr. Neoma Habermann. Called Dr. Bhavya Hutchinson to confirm this was on the correct patient as Dr. Boris Sahni is covering today. Per Dr. Neoma Habermann, order is correct.   He will be covering tomorrow. 1445:  Dr. Marc Mauro called for an update. No new orders at this time. Continue with IV fluids and antibiotics and monitor. 1600:  Patient assessed. No changes noted. Patient still in [address]  HR 110s to 120s. Will continue to monitor. 1800:  Bruising and redness noted to wrist under restraint. Patient repositioned and cleaned up after vomiting. 1827:  Patient has vomited multiple times in the past hour. HR back up to 140. New bruising noted to left wrist where restraint is located. Dr. Marc Mauro called and notified. .  Order for sitter. D/C restraint. Compazine PRN ordered. Patient now NPO. 1830:  Restraint discontinued due to redness/bruising at site. Charge nurse notified of sitter need. 1845:  Patient vomited another three times. Dr. Marc Mauro called. NGT requested. MD does not want to place NGT unless needed per xray. KUB ordered. Patient now NPO.

## 2019-07-28 PROBLEM — D64.9 ANEMIA: Chronic | Status: ACTIVE | Noted: 2019-07-28

## 2019-07-28 LAB
ANION GAP SERPL CALC-SCNC: 6 MMOL/L (ref 5–15)
ATRIAL RATE: 160 BPM
BASOPHILS # BLD: 0 K/UL (ref 0–0.1)
BASOPHILS NFR BLD: 0 % (ref 0–1)
BUN SERPL-MCNC: 10 MG/DL (ref 6–20)
BUN/CREAT SERPL: 24 (ref 12–20)
CALCIUM SERPL-MCNC: 7.8 MG/DL (ref 8.5–10.1)
CALCULATED P AXIS, ECG09: 56 DEGREES
CALCULATED R AXIS, ECG10: -154 DEGREES
CALCULATED T AXIS, ECG11: 26 DEGREES
CHLORIDE SERPL-SCNC: 108 MMOL/L (ref 97–108)
CO2 SERPL-SCNC: 25 MMOL/L (ref 21–32)
CREAT SERPL-MCNC: 0.41 MG/DL (ref 0.7–1.3)
DIAGNOSIS, 93000: NORMAL
DIFFERENTIAL METHOD BLD: ABNORMAL
EOSINOPHIL # BLD: 0 K/UL (ref 0–0.4)
EOSINOPHIL NFR BLD: 0 % (ref 0–7)
ERYTHROCYTE [DISTWIDTH] IN BLOOD BY AUTOMATED COUNT: 15 % (ref 11.5–14.5)
GLUCOSE SERPL-MCNC: 95 MG/DL (ref 65–100)
HCT VFR BLD AUTO: 29.3 % (ref 36.6–50.3)
HGB BLD-MCNC: 9 G/DL (ref 12.1–17)
IMM GRANULOCYTES # BLD AUTO: 0 K/UL
IMM GRANULOCYTES NFR BLD AUTO: 0 %
LYMPHOCYTES # BLD: 0.3 K/UL (ref 0.8–3.5)
LYMPHOCYTES NFR BLD: 4 % (ref 12–49)
MCH RBC QN AUTO: 23.5 PG (ref 26–34)
MCHC RBC AUTO-ENTMCNC: 30.7 G/DL (ref 30–36.5)
MCV RBC AUTO: 76.5 FL (ref 80–99)
MONOCYTES # BLD: 0.8 K/UL (ref 0–1)
MONOCYTES NFR BLD: 12 % (ref 5–13)
NEUTS SEG # BLD: 5.4 K/UL (ref 1.8–8)
NEUTS SEG NFR BLD: 84 % (ref 32–75)
NRBC # BLD: 0 K/UL (ref 0–0.01)
NRBC BLD-RTO: 0 PER 100 WBC
P-R INTERVAL, ECG05: 112 MS
PLATELET # BLD AUTO: 143 K/UL (ref 150–400)
PLATELET COMMENTS,PCOM: ABNORMAL
PMV BLD AUTO: 9.8 FL (ref 8.9–12.9)
POTASSIUM SERPL-SCNC: 3.1 MMOL/L (ref 3.5–5.1)
Q-T INTERVAL, ECG07: 280 MS
QRS DURATION, ECG06: 94 MS
QTC CALCULATION (BEZET), ECG08: 456 MS
RBC # BLD AUTO: 3.83 M/UL (ref 4.1–5.7)
RBC MORPH BLD: ABNORMAL
SODIUM SERPL-SCNC: 139 MMOL/L (ref 136–145)
VENTRICULAR RATE, ECG03: 160 BPM
WBC # BLD AUTO: 6.5 K/UL (ref 4.1–11.1)

## 2019-07-28 PROCEDURE — 80048 BASIC METABOLIC PNL TOTAL CA: CPT

## 2019-07-28 PROCEDURE — 74011250636 HC RX REV CODE- 250/636: Performed by: INTERNAL MEDICINE

## 2019-07-28 PROCEDURE — C9254 INJECTION, LACOSAMIDE: HCPCS | Performed by: INTERNAL MEDICINE

## 2019-07-28 PROCEDURE — 65660000000 HC RM CCU STEPDOWN

## 2019-07-28 PROCEDURE — 74011000258 HC RX REV CODE- 258: Performed by: INTERNAL MEDICINE

## 2019-07-28 PROCEDURE — C9113 INJ PANTOPRAZOLE SODIUM, VIA: HCPCS | Performed by: INTERNAL MEDICINE

## 2019-07-28 PROCEDURE — 85025 COMPLETE CBC W/AUTO DIFF WBC: CPT

## 2019-07-28 PROCEDURE — 36415 COLL VENOUS BLD VENIPUNCTURE: CPT

## 2019-07-28 RX ORDER — HYDROMORPHONE HYDROCHLORIDE 2 MG/ML
0.5 INJECTION, SOLUTION INTRAMUSCULAR; INTRAVENOUS; SUBCUTANEOUS
Status: DISCONTINUED | OUTPATIENT
Start: 2019-07-28 | End: 2019-07-31 | Stop reason: HOSPADM

## 2019-07-28 RX ORDER — POTASSIUM CHLORIDE 7.45 MG/ML
10 INJECTION INTRAVENOUS
Status: COMPLETED | OUTPATIENT
Start: 2019-07-28 | End: 2019-07-28

## 2019-07-28 RX ORDER — SODIUM CHLORIDE 9 MG/ML
100 INJECTION, SOLUTION INTRAVENOUS CONTINUOUS
Status: DISCONTINUED | OUTPATIENT
Start: 2019-07-28 | End: 2019-07-29

## 2019-07-28 RX ADMIN — Medication 10 ML: at 20:49

## 2019-07-28 RX ADMIN — CEFEPIME HYDROCHLORIDE 2 G: 2 INJECTION, POWDER, FOR SOLUTION INTRAVENOUS at 23:08

## 2019-07-28 RX ADMIN — POTASSIUM CHLORIDE 10 MEQ: 10 INJECTION, SOLUTION INTRAVENOUS at 10:11

## 2019-07-28 RX ADMIN — Medication 10 ML: at 15:20

## 2019-07-28 RX ADMIN — SODIUM CHLORIDE 100 MG: 900 INJECTION, SOLUTION INTRAVENOUS at 20:48

## 2019-07-28 RX ADMIN — PANTOPRAZOLE SODIUM 40 MG: 40 INJECTION, POWDER, FOR SOLUTION INTRAVENOUS at 20:48

## 2019-07-28 RX ADMIN — SODIUM CHLORIDE 100 ML/HR: 900 INJECTION, SOLUTION INTRAVENOUS at 08:35

## 2019-07-28 RX ADMIN — CEFEPIME HYDROCHLORIDE 2 G: 2 INJECTION, POWDER, FOR SOLUTION INTRAVENOUS at 08:33

## 2019-07-28 RX ADMIN — ENOXAPARIN SODIUM 40 MG: 40 INJECTION SUBCUTANEOUS at 20:48

## 2019-07-28 RX ADMIN — CEFEPIME HYDROCHLORIDE 2 G: 2 INJECTION, POWDER, FOR SOLUTION INTRAVENOUS at 15:10

## 2019-07-28 RX ADMIN — PANTOPRAZOLE SODIUM 40 MG: 40 INJECTION, POWDER, FOR SOLUTION INTRAVENOUS at 08:33

## 2019-07-28 RX ADMIN — SODIUM CHLORIDE 100 ML/HR: 900 INJECTION, SOLUTION INTRAVENOUS at 19:32

## 2019-07-28 RX ADMIN — HYDROMORPHONE HYDROCHLORIDE 0.5 MG: 2 INJECTION INTRAMUSCULAR; INTRAVENOUS; SUBCUTANEOUS at 15:08

## 2019-07-28 RX ADMIN — SODIUM CHLORIDE 100 MG: 900 INJECTION, SOLUTION INTRAVENOUS at 11:08

## 2019-07-28 RX ADMIN — POTASSIUM CHLORIDE 10 MEQ: 10 INJECTION, SOLUTION INTRAVENOUS at 08:33

## 2019-07-28 RX ADMIN — Medication 10 ML: at 05:42

## 2019-07-28 RX ADMIN — HYDROMORPHONE HYDROCHLORIDE 0.5 MG: 2 INJECTION INTRAMUSCULAR; INTRAVENOUS; SUBCUTANEOUS at 19:29

## 2019-07-28 NOTE — PROGRESS NOTES
Elfego Jansen eliza Homer 81  [address] [facility], [address] [place] [address]  [phone]      Medical Progress Note      NAME: Sha Camacho   :  1985  MRM:  [id number]    Date/Time: 2019  8:08 AM         Subjective:     Chief Complaint:  Non-verbal: unable to obtain. Per Nursing, had dark emesis overnight, concerned for GI bleeding    ROS:  (bold if positive, if negative)    Unable to obtain       Objective:       Vitals:          Last 24hrs VS reviewed since prior progress note.  Most recent are:    Visit Vitals  /66   Pulse 96   Temp 98 °F (36.7 °C)   Resp 24   Wt 68 kg (150 lb)   SpO2 95%   BMI 19.26 kg/m²     SpO2 Readings from Last 6 Encounters:   19 95%   19 100%   18 99%   18 95%   18 91%   17 100%            Intake/Output Summary (Last 24 hours) at 2019 4354  Last data filed at 2019 0545  Gross per 24 hour   Intake 2987.68 ml   Output 1675 ml   Net 1312.68 ml          Exam:     Physical Exam:    Gen:  Chronically ill-appearing, contracted, in no acute distress  HEENT:  Pink conjunctivae, PERRL, hearing intact to voice, moist mucous membranes  Neck:  Supple, without masses, thyroid non-tender  Resp:  No accessory muscle use, clear breath sounds without wheezes rales or rhonchi  Card:  No murmurs, normal S1, S2 without thrills, bruits or peripheral edema  Abd:  Soft, non-tender, non-distended, normoactive bowel sounds are present, no palpable organomegaly and no detectable hernias  Lymph:  No cervical or inguinal adenopathy  Musc:  No cyanosis or clubbing  Skin:  No rashes or ulcers, skin turgor is good  Neuro:  Cranial nerves are grossly intact, moves all extremities equally but has severe contractures in all 4 limbs, does not follow commands appropriately  Psych:  No insight, not oriented to person, place and time, alert       Telemetry reviewed:   sinus tach    Medications Reviewed: (see below)    Lab Data Reviewed: (see below)    ______________________________________________________________________    Medications:     Current Facility-Administered Medications   Medication Dose Route Frequency    potassium chloride 10 mEq in 100 ml IVPB  10 mEq IntraVENous Q1H    bisacodyl (DULCOLAX) suppository 10 mg  10 mg Rectal EVERY OTHER DAY    prochlorperazine (COMPAZINE) injection 10 mg  10 mg IntraVENous Q6H PRN    pantoprazole (PROTONIX) 40 mg in sodium chloride 0.9% 10 mL injection  40 mg IntraVENous Q12H    docusate (COLACE) 50 mg/5 mL oral liquid 100 mg  100 mg Oral BID    ibuprofen (MOTRIN) tablet 600 mg  600 mg Oral Q6H PRN    PHENYLephrine (JAMIE-SYNEPHRINE) 30 mg in 0.9% sodium chloride 250 mL infusion   mcg/min IntraVENous TITRATE    cefepime (MAXIPIME) 2 g in 0.9% sodium chloride (MBP/ADV) 100 mL  2 g IntraVENous Q8H    albuterol-ipratropium (DUO-NEB) 2.5 MG-0.5 MG/3 ML  3 mL Nebulization Q4H PRN    lacosamide (VIMPAT) tablet 100 mg  100 mg Oral BID    sodium chloride (NS) flush 5-40 mL  5-40 mL IntraVENous Q8H    sodium chloride (NS) flush 5-40 mL  5-40 mL IntraVENous PRN    acetaminophen (TYLENOL) tablet 650 mg  650 mg Oral Q4H PRN    naloxone (NARCAN) injection 0.4 mg  0.4 mg IntraVENous PRN    diphenhydrAMINE (BENADRYL) injection 12.5 mg  12.5 mg IntraVENous Q4H PRN    ondansetron (ZOFRAN) injection 4 mg  4 mg IntraVENous Q6H PRN    enoxaparin (LOVENOX) injection 40 mg  40 mg SubCUTAneous Q24H    LORazepam (ATIVAN) injection 1 mg  1 mg IntraVENous Q6H PRN            Lab Review:     Recent Labs     07/28/19  0321 07/27/19  0015 07/26/19  0216   WBC 6.5 14.8* 14.1*   HGB 9.0* 9.5* 9.4*   HCT 29.3* 31.6* 30.9*   * 123* 148*     Recent Labs     07/28/19  0321 07/27/19  0015 07/26/19  0216 07/25/19 1928    139 139 138   K 3.1* 3.6 3.6 4.2    110* 107 105   CO2 25 23 29 27   GLU 95 139* 119* 94   BUN 10 15 15 17   CREA 0.41* 0.57* 0.75 0.72   CA 7.8* 7.4* 8.5 9.6   MG  --   --  2.0  -- PHOS  --   --  3.3  --    ALB  --   --  2.9* 3.4*   TBILI  --   --  0.2 0.2   SGOT  --   --  12* 16   ALT  --   --  17 20     Lab Results   Component Value Date/Time    Glucose (POC) 83 07/23/2019 03:47 PM    Glucose (POC) PLEASE DISREGARD RESULTS 02/13/2017 11:58 PM    Glucose (POC) 95 02/13/2017 11:58 PM     No results for input(s): PH, PCO2, PO2, HCO3, FIO2 in the last 72 hours. No results for input(s): INR in the last 72 hours.     No lab exists for component: INREXT, INREXT  No results found for: SDES  Lab Results   Component Value Date/Time    Culture result: NO GROWTH 3 DAYS 07/25/2019 09:03 PM    Culture result: PROBABLE PROTEUS SPECIES (A) 07/25/2019 07:30 PM    Culture result: NO SIGNIFICANT GROWTH 07/23/2019 05:35 PM            Assessment:     Principal Problem:    Pyelonephritis (7/25/2019)    Active Problems:    Complicated UTI (urinary tract infection) (7/25/2019)      TBI (traumatic brain injury) ([facility] (7/25/2019)      Septic shock ([facility] (7/27/2019)      Anemia (7/28/2019)           Plan:     Principal Problem:    Pyelonephritis (7/25/2019)/Complicated UTI (urinary tract infection) (7/25/2019) due to chronic indwelling Grigsby catheter POA   - continue antibiotics as above   - urine culture with 60K Proteus, awaiting final    Active Problems:    TBI (traumatic brain injury) ([facility] (7/25/2019)   - baseline mental status is non-verbal   - continue Vimpat for seizure d/o   - Palliative Care consult tomorrow      Septic shock ([address] (7/27/2019)   - resolved, off pressors      Anemia (7/28/2019)   - Hgb down slightly overnight, NPO now, adding IV fluids   - continue PPI and monitor for evidence of ongoing GI blood loss   - consult GI      Total time spent in patient care: 35 minutes                  Care Plan discussed with: Patient, Nursing Staff and Dr. Nhi Snowden    Discussed:  Code Status, Care Plan and D/C Planning    Prophylaxis:  Lovenox    Disposition:   PT, OT, RN           ___________________________________________________    Attending Physician: Jolene Gutierrez MD

## 2019-07-28 NOTE — CONSULTS
[facility] [place]. Claribel Devi M.D.  [phone]                    GASTROENTEROLOGY CONSULTATION NOTE              NAME:  Dev Zavala   :   1985   MRN:   [id number]       Referring Physician:    Dr. Latoya Rojas Date:   2019 1:43 PM    Chief Complaint:    Coffee ground emesis     History of Present Illness:    Patient is a 30 y.o. who was admitted on  for acute pyelonephritis after he had few episodes of vomiting of undigested food and secretions and had hematuria. He was started on antibiotics and was reported by his father yesterday to have coffee ground on a washcloth next to his mouth. He is non-verbal, per nursing this has not recurred. He has underlying constipation and has not had a bowel movement. His mother reports history of reflux and previous history of vomiting blood where he has been to the ER at the [place] twice. They are unsure if he had an EGD in the past.   He has not had any recurrence of coffee ground from his mouth today. PMH:  Past Medical History:   Diagnosis Date    Anemia 2019    Neurological disorder     traumatic brain injury    Seizures ([facility]     TBI (traumatic brain injury) ([facility]        PSH:  No past surgical history on file. Allergies:  No Known Allergies    Home Medications:  Prior to Admission Medications   Prescriptions Last Dose Informant Patient Reported? Taking?   diazePAM (VALIUM) 5 mg tablet  Parent Yes Yes   Sig: Take 5 mg by mouth two (2) times daily as needed. lacosamide (VIMPAT) 100 mg tab tablet 2019 at AM Parent No Yes   Sig: Take 1 Tab by mouth two (2) times a day. Max Daily Amount: 200 mg.       Facility-Administered Medications: None       Hospital Medications:  Current Facility-Administered Medications   Medication Dose Route Frequency    0.9% sodium chloride infusion  100 mL/hr IntraVENous CONTINUOUS    lacosamide (VIMPAT) 100 mg in 0.9% sodium chloride 100 mL IVPB  100 mg IntraVENous Q12H    bisacodyl (DULCOLAX) suppository 10 mg  10 mg Rectal EVERY OTHER DAY    prochlorperazine (COMPAZINE) injection 10 mg  10 mg IntraVENous Q6H PRN    pantoprazole (PROTONIX) 40 mg in sodium chloride 0.9% 10 mL injection  40 mg IntraVENous Q12H    docusate (COLACE) 50 mg/5 mL oral liquid 100 mg  100 mg Oral BID    ibuprofen (MOTRIN) tablet 600 mg  600 mg Oral Q6H PRN    PHENYLephrine (JAMIE-SYNEPHRINE) 30 mg in 0.9% sodium chloride 250 mL infusion   mcg/min IntraVENous TITRATE    cefepime (MAXIPIME) 2 g in 0.9% sodium chloride (MBP/ADV) 100 mL  2 g IntraVENous Q8H    albuterol-ipratropium (DUO-NEB) 2.5 MG-0.5 MG/3 ML  3 mL Nebulization Q4H PRN    sodium chloride (NS) flush 5-40 mL  5-40 mL IntraVENous Q8H    sodium chloride (NS) flush 5-40 mL  5-40 mL IntraVENous PRN    acetaminophen (TYLENOL) tablet 650 mg  650 mg Oral Q4H PRN    naloxone (NARCAN) injection 0.4 mg  0.4 mg IntraVENous PRN    diphenhydrAMINE (BENADRYL) injection 12.5 mg  12.5 mg IntraVENous Q4H PRN    ondansetron (ZOFRAN) injection 4 mg  4 mg IntraVENous Q6H PRN    enoxaparin (LOVENOX) injection 40 mg  40 mg SubCUTAneous Q24H    LORazepam (ATIVAN) injection 1 mg  1 mg IntraVENous Q6H PRN       Social History:  Social History     Tobacco Use    Smoking status: Never Smoker    Smokeless tobacco: Never Used   Substance Use Topics    Alcohol use: No       Family History:  Family History   Problem Relation Age of Onset    No Known Problems Other         reviewed. Patient does not know       Review of Systems:  Could not obtain    Objective:     Patient Vitals for the past 8 hrs:   BP Temp Pulse Resp SpO2   07/28/19 1215 131/75  (!) 108 26 98 %   07/28/19 0900 134/79       07/28/19 0859   (!) 109 28 98 %   07/28/19 0800 127/81  (!) 114 28 93 %   07/28/19 0700 115/66 98.3 °F (36.8 °C) 96 24 95 %   07/28/19 0646   (!) 114     07/28/19 0600 109/65  95 19 97 %     No intake/output data recorded.   07/26 1901 - 07/28 0700  In: 5764.9 [P.O.:360; I.V.:5404.9]  Out: 5897 [Urine:2575]    EXAM:     Elease Finger, non verbal   HEENT-no evidence of recent bleeding   LUNGS-clear to auscultation bilaterally    COR-regular rate and rhythym     ABD- soft, non-tender. Bowel sounds normal. No masses,  no organomegaly     EXT-no edema    Skin - No rash     Data Review     Recent Labs     07/28/19 0321 07/27/19 0015   WBC 6.5 14.8*   HGB 9.0* 9.5*   HCT 29.3* 31.6*   * 123*     Recent Labs     07/28/19 0321 07/27/19 0015 07/26/19 0216    139 139   K 3.1* 3.6 3.6    110* 107   CO2 25 23 29   BUN 10 15 15   CREA 0.41* 0.57* 0.75   GLU 95 139* 119*   PHOS  --   --  3.3   CA 7.8* 7.4* 8.5     Recent Labs     07/26/19 0216 07/25/19  1928   SGOT 12* 16   AP 63 72   TP 7.1 8.4*   ALB 2.9* 3.4*   GLOB 4.2* 5.0*   LPSE  --  183     No results for input(s): INR, PTP, APTT in the last 72 hours. No lab exists for component: INREXT    Patient Active Problem List   Diagnosis Code    Pyelonephritis E43    Complicated UTI (urinary tract infection) N39.0    [facility] S06. 9X9A    Septic shock (HCC) A41.9, R65.21    Anemia D64.9       Assessment and Plan:  Episode of coffee ground emesis reported by his father yesterday and has not recurred today. Possible underlying esophagitis or gastritis, with recent vomiting prior to his admission. Would keep on PPI and monitor at this point, endoscopy will be indicated if recurrent coffee ground emesis. Will also need to remain on daily bowel regime to avoid constipation, this can include senokot and Miralax if current regimen fails. Will follow. Thanks for allowing me to participate in the care of this patient.   Signed By: Karlos Yusuf MD     7/28/2019  1:43 PM

## 2019-07-28 NOTE — PROGRESS NOTES
Spiritual Care Assessment/Progress Note  [facility]      NAME: Jocelyn Tillman      MRN: [id number]  AGE: 30 y.o. SEX: male  Jain Affiliation: Synagogue   Language: English     7/28/2019     Total Time (in minutes): 5     Spiritual Assessment begun in [facility] through conversation with:         []Patient        [] Family    [] Friend(s)        Reason for Consult: Palliative Care, Initial/Spiritual Assessment     Spiritual beliefs: (Please include comment if needed)     [] Identifies with a alley tradition:         [] Supported by a alley community:            [] Claims no spiritual orientation:           [] Seeking spiritual identity:                [] Adheres to an individual form of spirituality:           [x] Not able to assess:                           Identified resources for coping:      [] Prayer                               [] Music                  [] Guided Imagery     [] Family/friends                 [] Pet visits     [] Devotional reading                         [] Unknown     [] Other:                                           Interventions offered during this visit: (See comments for more details)    Patient Interventions: Initial visit(Attempted)           Plan of Care:     [] Support spiritual and/or cultural needs    [] Support AMD and/or advance care planning process      [] Support grieving process   [] Coordinate Rites and/or Rituals    [] Coordination with community clergy   [] No spiritual needs identified at this time   [] Detailed Plan of Care below (See Comments)  [] Make referral to Music Therapy  [] Make referral to Pet Therapy     [] Make referral to Addiction services  [] Make referral to [facility]  [] Make referral to Spiritual Care Partner  [] No future visits requested        [x] Follow up visits as needed     Comments: Attempted Initial Palliative Care spiritual assessment in [facility].  Staff was working with Mr. Navin Barajas, unable to assess at this time.. Chaplains will continue to follow as able and/or needed.   Visited by: Cee Hernández., MS., [address] ([id number])

## 2019-07-28 NOTE — PROGRESS NOTES
1920 - Bedside and Verbal shift change report given to Janell Duran (oncoming nurse) by Geronimo Palumbo RN (offgoing nurse). Report included the following information SBAR, Kardex, ED Summary, Intake/Output, MAR, Recent Results and Cardiac Rhythm Sinus Tach. Primary Nurse Ochoa Lindquist and Geronimo Palumbo RN performed a dual skin assessment on this patient No impairment noted. Blanchable redness to sacrum. Bhupinder score is 13.  2000 - Shift assessment completed. See flow sheet. Patient nonverbal, eyes open spontaneously. On room air. Follows commands. NS infusing at 100 ml/hr. Condom cath in place and draining. Patient turned and repositioned. Resting quietly in bed with sitter at bedside. 2200 - Patient resting quietly with sitter at bedside. Turned and repositioned. 0000 - Reassessment completed. No changes to previous assessment. Patient resting quietly in no acute distress or discomfort at this time. 0200 - AM labs drawn and sent to lab. Patient turned and repositioned. 0300 - BS with morning labs was 74. Rechecked BS with glucometer and BS was 70. Notified Dr. Giovana Mathur of BS and that patient is NPO. New order given to switch IV fluids from NS to D5NS. Due to patient not being diabetic, no order given for scheduled glucose checks. 0400 - Reassessment completed. No changes to previous assessments. In no acute distress or discomfort at this time. 0600 - Patient turned and repositioned. Resting quietly in bed watching tv.  0700 - Bedside and Verbal shift change report given to Natalya Chiu RN (oncoming nurse) by Padilla Maloney RN (offgoing nurse). Report included the following information SBAR, Kardex, ED Summary, Intake/Output, MAR, Recent Results and Cardiac Rhythm NSR.
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
## 2019-07-29 PROBLEM — N28.9 RENAL INSUFFICIENCY: Status: ACTIVE | Noted: 2019-07-29

## 2019-07-29 PROBLEM — E87.6 HYPOKALEMIA: Status: ACTIVE | Noted: 2019-07-29

## 2019-07-29 PROBLEM — E87.6 HYPOKALEMIA: Status: RESOLVED | Noted: 2019-07-29 | Resolved: 2019-07-29

## 2019-07-29 LAB
ANION GAP SERPL CALC-SCNC: 6 MMOL/L (ref 5–15)
BACTERIA SPEC CULT: ABNORMAL
BACTERIA SPEC CULT: ABNORMAL
BUN SERPL-MCNC: 8 MG/DL (ref 6–20)
BUN/CREAT SERPL: 13 (ref 12–20)
CALCIUM SERPL-MCNC: 7.7 MG/DL (ref 8.5–10.1)
CC UR VC: ABNORMAL
CHLORIDE SERPL-SCNC: 107 MMOL/L (ref 97–108)
CO2 SERPL-SCNC: 25 MMOL/L (ref 21–32)
CREAT SERPL-MCNC: 0.6 MG/DL (ref 0.7–1.3)
ERYTHROCYTE [DISTWIDTH] IN BLOOD BY AUTOMATED COUNT: 15.3 % (ref 11.5–14.5)
GLUCOSE BLD STRIP.AUTO-MCNC: 70 MG/DL (ref 65–100)
GLUCOSE BLD STRIP.AUTO-MCNC: 92 MG/DL (ref 65–100)
GLUCOSE SERPL-MCNC: 74 MG/DL (ref 65–100)
HCT VFR BLD AUTO: 27.2 % (ref 36.6–50.3)
HGB BLD-MCNC: 8.2 G/DL (ref 12.1–17)
MAGNESIUM SERPL-MCNC: 1.8 MG/DL (ref 1.6–2.4)
MCH RBC QN AUTO: 23.2 PG (ref 26–34)
MCHC RBC AUTO-ENTMCNC: 30.1 G/DL (ref 30–36.5)
MCV RBC AUTO: 77.1 FL (ref 80–99)
NRBC # BLD: 0 K/UL (ref 0–0.01)
NRBC BLD-RTO: 0 PER 100 WBC
PHOSPHATE SERPL-MCNC: 2.7 MG/DL (ref 2.6–4.7)
PLATELET # BLD AUTO: 134 K/UL (ref 150–400)
PMV BLD AUTO: 10 FL (ref 8.9–12.9)
POTASSIUM SERPL-SCNC: 3.3 MMOL/L (ref 3.5–5.1)
RBC # BLD AUTO: 3.53 M/UL (ref 4.1–5.7)
SERVICE CMNT-IMP: ABNORMAL
SERVICE CMNT-IMP: NORMAL
SERVICE CMNT-IMP: NORMAL
SODIUM SERPL-SCNC: 138 MMOL/L (ref 136–145)
WBC # BLD AUTO: 3.2 K/UL (ref 4.1–11.1)

## 2019-07-29 PROCEDURE — 97530 THERAPEUTIC ACTIVITIES: CPT

## 2019-07-29 PROCEDURE — C9254 INJECTION, LACOSAMIDE: HCPCS | Performed by: INTERNAL MEDICINE

## 2019-07-29 PROCEDURE — 74011250636 HC RX REV CODE- 250/636: Performed by: INTERNAL MEDICINE

## 2019-07-29 PROCEDURE — 92526 ORAL FUNCTION THERAPY: CPT

## 2019-07-29 PROCEDURE — 74011250637 HC RX REV CODE- 250/637: Performed by: INTERNAL MEDICINE

## 2019-07-29 PROCEDURE — 85027 COMPLETE CBC AUTOMATED: CPT

## 2019-07-29 PROCEDURE — 36415 COLL VENOUS BLD VENIPUNCTURE: CPT

## 2019-07-29 PROCEDURE — 74011000258 HC RX REV CODE- 258: Performed by: INTERNAL MEDICINE

## 2019-07-29 PROCEDURE — 82962 GLUCOSE BLOOD TEST: CPT

## 2019-07-29 PROCEDURE — 65660000000 HC RM CCU STEPDOWN

## 2019-07-29 PROCEDURE — C9113 INJ PANTOPRAZOLE SODIUM, VIA: HCPCS | Performed by: INTERNAL MEDICINE

## 2019-07-29 PROCEDURE — 80048 BASIC METABOLIC PNL TOTAL CA: CPT

## 2019-07-29 PROCEDURE — 76450000000

## 2019-07-29 PROCEDURE — 83735 ASSAY OF MAGNESIUM: CPT

## 2019-07-29 PROCEDURE — 84100 ASSAY OF PHOSPHORUS: CPT

## 2019-07-29 RX ORDER — DEXTROSE MONOHYDRATE AND SODIUM CHLORIDE 5; .9 G/100ML; G/100ML
100 INJECTION, SOLUTION INTRAVENOUS CONTINUOUS
Status: DISCONTINUED | OUTPATIENT
Start: 2019-07-29 | End: 2019-07-31 | Stop reason: HOSPADM

## 2019-07-29 RX ORDER — POTASSIUM CHLORIDE 7.45 MG/ML
10 INJECTION INTRAVENOUS
Status: COMPLETED | OUTPATIENT
Start: 2019-07-29 | End: 2019-07-29

## 2019-07-29 RX ADMIN — SODIUM CHLORIDE 100 MG: 900 INJECTION, SOLUTION INTRAVENOUS at 08:54

## 2019-07-29 RX ADMIN — DEXTROSE MONOHYDRATE AND SODIUM CHLORIDE 100 ML/HR: 5; .9 INJECTION, SOLUTION INTRAVENOUS at 17:31

## 2019-07-29 RX ADMIN — PANTOPRAZOLE SODIUM 40 MG: 40 INJECTION, POWDER, FOR SOLUTION INTRAVENOUS at 08:42

## 2019-07-29 RX ADMIN — CEFEPIME HYDROCHLORIDE 2 G: 2 INJECTION, POWDER, FOR SOLUTION INTRAVENOUS at 06:13

## 2019-07-29 RX ADMIN — ENOXAPARIN SODIUM 40 MG: 40 INJECTION SUBCUTANEOUS at 21:18

## 2019-07-29 RX ADMIN — CEFTRIAXONE SODIUM 1 G: 1 INJECTION, POWDER, FOR SOLUTION INTRAMUSCULAR; INTRAVENOUS at 11:29

## 2019-07-29 RX ADMIN — DOCUSATE SODIUM 100 MG: 50 LIQUID ORAL at 08:41

## 2019-07-29 RX ADMIN — Medication 10 ML: at 21:17

## 2019-07-29 RX ADMIN — BISACODYL 10 MG: 10 SUPPOSITORY RECTAL at 11:29

## 2019-07-29 RX ADMIN — PANTOPRAZOLE SODIUM 40 MG: 40 INJECTION, POWDER, FOR SOLUTION INTRAVENOUS at 21:17

## 2019-07-29 RX ADMIN — Medication 10 ML: at 06:13

## 2019-07-29 RX ADMIN — POTASSIUM CHLORIDE 10 MEQ: 10 INJECTION, SOLUTION INTRAVENOUS at 08:41

## 2019-07-29 RX ADMIN — POTASSIUM CHLORIDE 10 MEQ: 10 INJECTION, SOLUTION INTRAVENOUS at 10:21

## 2019-07-29 RX ADMIN — Medication 10 ML: at 16:05

## 2019-07-29 RX ADMIN — DEXTROSE MONOHYDRATE AND SODIUM CHLORIDE 100 ML/HR: 5; .9 INJECTION, SOLUTION INTRAVENOUS at 03:17

## 2019-07-29 RX ADMIN — SODIUM CHLORIDE 100 MG: 900 INJECTION, SOLUTION INTRAVENOUS at 21:17

## 2019-07-29 RX ADMIN — DOCUSATE SODIUM 100 MG: 50 LIQUID ORAL at 17:26
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
## 2019-07-29 NOTE — PROGRESS NOTES
Spiritual Care Assessment/Progress Note  [address]      NAME: [place]      MRN: [id number]  AGE: 30 y.o. SEX: male  Latter day Affiliation: Taoism   Language: English     7/29/2019     Total Time (in minutes): 9     Spiritual Assessment begun in [facility] through conversation with:         [x]Patient        [] Family    [x] Friend(s)        Reason for Consult: Palliative Care, Initial/Spiritual Assessment     Spiritual beliefs Per Aid: (Please include comment if needed)     [] Identifies with a alley tradition:    Mirella Smoker      [] Supported by a alley community:            [] Claims no spiritual orientation:           [] Seeking spiritual identity:                [] Adheres to an individual form of spirituality:           [] Not able to assess:                           Identified resources for coping:      [] Prayer                               [] Music                  [] Guided Imagery     [] Family/friends                 [] Pet visits     [] Devotional reading                         [] Unknown     [] Other:                                         Interventions offered during this visit: (See comments for more details)    Patient Interventions: Iconic (affirming the presence of God/Higher Power), Affirmation of alley, Prayer (assurance of)     Family/Friend(s):  Affirmation of alley, Prayer (assurance of), Catharsis/review of pertinent events in supportive environment     Plan of Care:     [] Support spiritual and/or cultural needs    [] Support AMD and/or advance care planning process      [] Support grieving process   [] Coordinate Rites and/or Rituals    [] Coordination with community clergy   [] No spiritual needs identified at this time   [] Detailed Plan of Care below (See Comments)  [] Make referral to Music Therapy  [] Make referral to Pet Therapy     [] Make referral to Addiction services  [] Make referral to [facility]  [] Make referral to Spiritual Care Partner  [] No future visits requested        [x] Follow up visits as needed     Comments: Initial Palliative Care spiritual assessment in [facility]. Mr. Gurvinder Chao Aid was in the rooms. He appeared to be sleeping. Chart indicates he is non-verbal.  His Aid shared she has been helping him for almost two months. She shared he has in the past attended a [employer]. Master Ye woke during the visit. He made eye contact with me as I introduced myself. He appeared to reach a hand out when I asked him if I could pray. Gently took his hand and provided assurance of prayers. Advised of  Availability.   Visited by: Priyank Shaffer., MS., [address] [employer] [address] ([id number])

## 2019-07-29 NOTE — PROGRESS NOTES
1545: Bedside shift change report given to Los Butler RN (oncoming nurse) by Thomas Carranza RN (offgoing nurse). Report included the following information SBAR, Intake/Output, MAR, Accordion, Recent Results, Med Rec Status and Cardiac Rhythm NSR. Primary Nurse Gisselle Nj and Thomas Carranza RN performed a dual skin assessment on this patient No impairment noted  Bhupinder score is see flowsheet. 1615: Assessment complete see flowsheet. Patient non verbal per baseline. Patient does follow simple commands, patient will lift and move arm up to have arm ban scanned, nods head appropriately. Lungs diminished throughout. NSR on monitor. VSS. Afebrile temp 96.9 axillary. Home care taker at bedside. No acute distress. Patient in bed watching cartoons. Call bell in reach. 1900: Bedside shift change report given to MELVIN Bray (oncoming nurse) by Los Butler RN (offgoing nurse). Report included the following information SBAR, Intake/Output, MAR, Accordion, Recent Results, Med Rec Status and Cardiac Rhythm NSR/ST.

## 2019-07-29 NOTE — PROGRESS NOTES
Shift Summary    Bedside and Verbal shift change report given to Norma Leal RN (oncoming nurse) by Sohail Joseph RN (offgoing nurse). Report included the following information SBAR, Kardex, MAR, Recent Results and Cardiac Rhythm  . Patient resting quietly in bed watching cartoons. Sitter at bedside.

## 2019-07-29 NOTE — PROGRESS NOTES
Problem: Dysphagia (Adult)  Goal: *Acute Goals and Plan of Care (Insert Text)  Description  Swallowing goals initiated 7-26-19:  1) tolerate dysphagia 1, thins without s/s aspiration by 7-29-19-discontinued: tolerate dyspahgia 1, nectars without s/s by 8-2-19  2) re-eval for solids once 24 hour caregivers present -met   Outcome: Progressing Towards Goal   SPE[facility] [address] TREATMENT  Patient: Sofia Vyas (27 y.o. male)  Date: 7/29/2019  Diagnosis: Pyelonephritis [N12] Pyelonephritis       Precautions: aspiration Fall, Seizure, Skin    ASSESSMENT:  Patient had vomiting over the weekend in copious amounts. Aspiration risk due to use of NTL and vomit is usually a thin liquid. MD restarted clears today; RN appropriately adjusted to Nectars. He is taking small amounts without vomiting     PLAN:  Recommendations and Planned Interventions:  Continue nectar thick liquids. Ok to advance to dyspahgi 1, nectars tomorrow if MD approves. Patient continues to benefit from skilled intervention to address the above impairments. Continue treatment per established plan of care. Discharge Recommendations: To Be Determined     SUBJECTIVE:   Patient up in his own wheelchair , caregiver feeding. .    OBJECTIVE:   Cognitive and Communication Status:  Neurologic State: Alert  Orientation Level: Unable to verbalize  Cognition: Unable to assess (comment)  Perception: Cues to maintain midline in sitting, Cues to maintain midline in standing, Tactile, Verbal, Visual  Perseveration: No perseveration noted  Safety/Judgement: Not assessed(due to impaired communication)  Dysphagia Treatment:  Oral Assessment:     P.O. Trials:  Patient Position: up in his chair  Vocal quality prior to P.O.:    Consistency Presented: Nectar thick liquid  How Presented: Straw;Successive swallows(caregiver fed)         ORAL PHASE:   Gulping sips of liquids. PHARYNGEAL PHSE:   Some delayed, weak cough  Needs a break bw sips. Cough weak                                        Exercises:  Laryngeal Exercises:                                                                                                                                   Pain:  Pain Scale 1: Adult Nonverbal Pain Scale  Pain Intensity 1: 0     After treatment:   ?              Patient left in no apparent distress sitting up in chair  ? Patient left in no apparent distress in bed  ? Call bell left within reach  ? Nursing notified  ? Caregiver present  ? Bed alarm activated    COMMUNICATION/EDUCATION:   Patient was educated regarding His deficit(s) of dysphagia as this relates to His diagnosis of pyelonephritis. He demonstrated Guarded understanding as evidenced by TBI. Caregiver understood. .    The patients plan of care including recommendations, planned interventions, and recommended diet changes were discussed with: Registered Nurse. ? Posted safety precautions in patient's room.     CATHLEEN Chen  Time Calculation: 10 mins

## 2019-07-29 NOTE — PROGRESS NOTES
[facility] [address]  [address] [facility], [address], [id number] [place] [address]  [phone]      Medical Progress Note      NAME: Esteban Gee   :  1985  MRM:  [id number]    Date/Time: 2019  8:12 AM          Subjective:     Chief Complaint:  Non-verbal: unable to obtain. Per Nursing, no further emesis  ROS:  (bold if positive, if negative)    Unable to obtain       Objective:       Vitals:          Last 24hrs VS reviewed since prior progress note.  Most recent are:    Visit Vitals  BP 98/56   Pulse 79   Temp 98.4 °F (36.9 °C)   Resp 17   Wt 68 kg (150 lb)   SpO2 97%   BMI 19.26 kg/m²     SpO2 Readings from Last 6 Encounters:   19 97%   19 100%   18 99%   18 95%   18 91%   17 100%            Intake/Output Summary (Last 24 hours) at 2019 8510  Last data filed at 2019 3927  Gross per 24 hour   Intake 2963.34 ml   Output 2095 ml   Net 868.34 ml          Exam:     Physical Exam:    Gen:  Chronically ill-appearing, contracted, in no acute distress  HEENT:  Pink conjunctivae, PERRL, hearing intact to voice, moist mucous membranes  Neck:  Supple, without masses, thyroid non-tender  Resp:  No accessory muscle use, clear breath sounds without wheezes rales or rhonchi  Card:  No murmurs, normal S1, S2 without thrills, bruits or peripheral edema  Abd:  Soft, non-tender, non-distended, normoactive bowel sounds are present, no palpable organomegaly and no detectable hernias  Lymph:  No cervical or inguinal adenopathy  Musc:  No cyanosis or clubbing  Skin:  No rashes or ulcers, skin turgor is good  Neuro:  Cranial nerves are grossly intact, moves all extremities equally but has severe contractures in all 4 limbs, does not follow commands appropriately  Psych:  No insight, not oriented to person, place and time, alert       Telemetry reviewed:   sinus tach    Medications Reviewed: (see below)    Lab Data Reviewed: (see below)    ______________________________________________________________________    Medications:     Current Facility-Administered Medications   Medication Dose Route Frequency    dextrose 5% and 0.9% NaCl infusion  100 mL/hr IntraVENous CONTINUOUS    potassium chloride 10 mEq in 100 ml IVPB  10 mEq IntraVENous Q1H    lacosamide (VIMPAT) 100 mg in 0.9% sodium chloride 100 mL IVPB  100 mg IntraVENous Q12H    HYDROmorphone (PF) (DILAUDID) injection 0.5 mg  0.5 mg IntraVENous Q4H PRN    bisacodyl (DULCOLAX) suppository 10 mg  10 mg Rectal EVERY OTHER DAY    prochlorperazine (COMPAZINE) injection 10 mg  10 mg IntraVENous Q6H PRN    pantoprazole (PROTONIX) 40 mg in sodium chloride 0.9% 10 mL injection  40 mg IntraVENous Q12H    docusate (COLACE) 50 mg/5 mL oral liquid 100 mg  100 mg Oral BID    ibuprofen (MOTRIN) tablet 600 mg  600 mg Oral Q6H PRN    PHENYLephrine (JAMIE-SYNEPHRINE) 30 mg in 0.9% sodium chloride 250 mL infusion   mcg/min IntraVENous TITRATE    cefepime (MAXIPIME) 2 g in 0.9% sodium chloride (MBP/ADV) 100 mL  2 g IntraVENous Q8H    albuterol-ipratropium (DUO-NEB) 2.5 MG-0.5 MG/3 ML  3 mL Nebulization Q4H PRN    sodium chloride (NS) flush 5-40 mL  5-40 mL IntraVENous Q8H    sodium chloride (NS) flush 5-40 mL  5-40 mL IntraVENous PRN    acetaminophen (TYLENOL) tablet 650 mg  650 mg Oral Q4H PRN    naloxone (NARCAN) injection 0.4 mg  0.4 mg IntraVENous PRN    diphenhydrAMINE (BENADRYL) injection 12.5 mg  12.5 mg IntraVENous Q4H PRN    ondansetron (ZOFRAN) injection 4 mg  4 mg IntraVENous Q6H PRN    enoxaparin (LOVENOX) injection 40 mg  40 mg SubCUTAneous Q24H    LORazepam (ATIVAN) injection 1 mg  1 mg IntraVENous Q6H PRN            Lab Review:     Recent Labs     07/29/19  0208 07/28/19  0321 07/27/19  0015   WBC 3.2* 6.5 14.8*   HGB 8.2* 9.0* 9.5*   HCT 27.2* 29.3* 31.6*   * 143* 123*     Recent Labs     07/29/19  0208 07/28/19  0321 07/27/19  0015    139 139   K 3.3* 3. 1* 3.6    108 110*   CO2 25 25 23   GLU 74 95 139*   BUN 8 10 15   CREA 0.60* 0.41* 0.57*   CA 7.7* 7.8* 7.4*   MG 1.8  --   --    PHOS 2.7  --   --      Lab Results   Component Value Date/Time    Glucose (POC) 92 07/29/2019 06:17 AM    Glucose (POC) 70 07/29/2019 03:09 AM    Glucose (POC) 83 07/23/2019 03:47 PM    Glucose (POC) PLEASE DISREGARD RESULTS 02/13/2017 11:58 PM    Glucose (POC) 95 02/13/2017 11:58 PM     No results for input(s): PH, PCO2, PO2, HCO3, FIO2 in the last 72 hours. No results for input(s): INR in the last 72 hours. No lab exists for component: INREXT, INREXT  No results found for: SDES  Lab Results   Component Value Date/Time    Culture result: NO GROWTH 4 DAYS 07/25/2019 09:03 PM    Culture result: PROTEUS MIRABILIS (A) 07/25/2019 07:30 PM    Culture result: (A) 07/25/2019 07:30 PM     CHECKING FOR POSSIBLE 2ND GRAM NEGATIVE AMRIK . ..(1,000 COL/mL)            Assessment:     Principal Problem:    Pyelonephritis (7/25/2019)    Active Problems:    Complicated UTI (urinary tract infection) (7/25/2019)      TBI (traumatic brain injury) ([facility] (7/25/2019)      Septic shock ([facility] (7/27/2019)      Anemia (7/28/2019)           Plan:     Principal Problem:    Pyelonephritis (7/25/2019)/Complicated UTI (urinary tract infection) (7/25/2019) due to chronic indwelling Grigsby catheter POA   - continue antibiotics as above   - urine culture with 60K Proteus, pan-sensistive, awaiting final, possible 2nd GNR    Active Problems:    TBI (traumatic brain injury) ([facility] (7/25/2019)   - baseline mental status is non-verbal   - continue Vimpat for seizure d/o   - Palliative Care consult tomorrow      Septic shock ([facility] [facility] (7/27/2019)   - resolved, off pressors      Anemia (7/28/2019)   - Hgb down further overnight but no evidence of ongoing GI blood loss   - no plans for GI for endoscopy since no evidence of recurrent bleeding   - will start clears today   - continue PPI      Total time spent in patient care: 35 minutes                  Care Plan discussed with: Patient, Care Manager, Nursing Staff and Dr. Luisa Carranza    Discussed:  Code Status, Care Plan and D/C Planning    Prophylaxis:  Lovenox    Disposition:  HH PT, OT, RN           ___________________________________________________    Attending Physician: Thomas Calderon MD

## 2019-07-29 NOTE — PROGRESS NOTES
[address] NP  [phone]           GI PROGRESS NOTE        NAME: Sofia Vyas   :  1985   MRN:  [id number]       Subjective:   Non verbal unable to obtain. Objective:   Nursing reports he has had a bowel movement and has had no further vomiting. VITALS:   Last 24hrs VS reviewed since prior progress note. Most recent are:  Visit Vitals  BP 94/69   Pulse 83   Temp 97.6 °F (36.4 °C)   Resp 18   Wt 68 kg (150 lb)   SpO2 100%   BMI 19.26 kg/m²       Intake/Output Summary (Last 24 hours) at 2019 1359  Last data filed at 2019 1125  Gross per 24 hour   Intake 3203.34 ml   Output 2925 ml   Net 278.34 ml       PHYSICAL EXAM:  General: Alert, in no acute distress    HEENT: Anicteric sclerae. Lungs:            CTA Bilaterally. Heart:  Regular  rhythm,    Abdomen: Soft, Non distended, Non tender.  (+)Bowel sounds, no HSM  Extremities: No c/c/e  Neurologic:. No acute neurological distress   Psych:   Not anxious nor agitated. Lab Data Reviewed:   Recent Labs     19  0208 19  0321   WBC 3.2* 6.5   HGB 8.2* 9.0*   HCT 27.2* 29.3*   * 143*     Recent Labs     19  0208 19  0321    139   K 3.3* 3.1*    108   CO2 25 25   BUN 8 10   CREA 0.60* 0.41*   GLU 74 95   PHOS 2.7  --    CA 7.7* 7.8*     No results for input(s): SGOT, GPT, AP, TBIL, TP, ALB, GLOB, GGT, AML, LPSE in the last 72 hours. No lab exists for component: Glory Iverson    ________________________________________________________________________  Patient Active Problem List   Diagnosis Code    Pyelonephritis V16    Complicated UTI (urinary tract infection) N39.0    TBI (traumatic brain injury) ([facility] S06. 9X9A    Septic shock (HCC) A41.9, R65.21    Anemia D64.9    Renal insufficiency N28.9         Assessment and Plan:  Coffee Ground Emesis;   None further since hospitalization  - Continue Clear liquid diet - ok to advance as tolerated tomorrow if no further vomiting  - Continue IV PPI BID  - Monitor Hemoglobin   - No NSAIDs    Constipation:  Nursing reports he has had a recent bowel movement.   - Continue daily suppository  - Consider Miralax and Senna if above is ineffective    Following       Signed By: Sonia Lindsay NP     7/29/2019  1:59 PM

## 2019-07-29 NOTE — PROGRESS NOTES
Problem: Mobility Impaired (Adult and Pediatric)  Goal: *Acute Goals and Plan of Care (Insert Text)  Description  Physical Therapy Goals  Initiated 7/26/2019  1. Patient will move from supine to sit and sit to supine  in bed with moderate assistance  within 7 day(s). 2.  Patient will transfer from bed to chair and chair to bed with moderate assistance  using the least restrictive device within 7 day(s). 3.  Patient will perform sit to stand with moderate assistance  within 7 day(s). 4.  Patient will ambulate with moderate assistance  for 5 feet with the least restrictive device within 7 day(s). Outcome: Progressing Towards Goal   PHYSICAL THERAPY TREATMENT  Patient: Jocelyn Tillman (89 y.o. male)  Date: 7/29/2019  Diagnosis: Pyelonephritis [N12] Pyelonephritis       Precautions: Fall, Seizure, Skin  Chart, physical therapy assessment, plan of care and goals were reviewed. ASSESSMENT:  Pt Fuentes Monsivais presents with good participation and continued limited functional mobility. Pt agreeable to bed to chair transfer and requires maximum assistance x 2. Home caregiver reports pt typically transfers with \"not a lot\" of assistance x 1 person, exiting bed to R and transferring to L once seated edge of bed. Pt remains out of bed in personal wheelchair with life- present. Progression toward goals:  ?    Improving appropriately and progressing toward goals  ? Improving slowly and progressing toward goals  ? Not making progress toward goals and plan of care will be adjusted     PLAN:  Patient continues to benefit from skilled intervention to address the above impairments. Continue treatment per established plan of care. Discharge Recommendations:  Home Health  Further Equipment Recommendations for Discharge:  none      SUBJECTIVE:   Patient nonverbal, agreeable to PT; communicates via nodding or R thumb up or down. Pt received supine, agreeable to PT and cleared by RN.       OBJECTIVE DATA SUMMARY: Critical Behavior:  Neurologic State: Eyes open spontaneously, Restless, Alert  Orientation Level: Unable to verbalize  Cognition: Unable to assess (comment)  Safety/Judgement: Not assessed(due to impaired communication)  Functional Mobility Training:  Bed Mobility:     Supine to Sit: Additional time;Maximum assistance;Assist x2     Scooting: Total assistance        Transfers:  Sit to Stand: Assist x2; Additional time;Maximum assistance  Stand to Sit: Assist x2; Additional time;Maximum assistance        Bed to Chair: Assist x2; Additional time;Maximum assistance                    Balance:  Sitting: Impaired  Sitting - Static: Poor (constant support)  Sitting - Dynamic: Poor (constant support)  Standing: Impaired  Standing - Static: Poor  Standing - Dynamic : Poor  Ambulation/Gait Training:         Requires total assistance for lateral weight shift, verbal one-step cues for LE repositioning, able to repositioning each foot slightly but not functional for gait. Pain:  Pain Scale 1: Adult Nonverbal Pain Scale  Pain Intensity 1: 0              Activity Tolerance:   No pt complaints. Please refer to the flowsheet for vital signs taken during this treatment. After treatment:   ?    Patient left in no apparent distress sitting up in chair  ? Patient left in no apparent distress in bed  ? Call bell left within reach  ? Nursing notified  ? Caregiver present  ?     Bed alarm activated    COMMUNICATION/COLLABORATION:   The patients plan of care was discussed with: Physical Therapist, Registered Nurse and Rehabilitation Attendant    Mis Chavez PT, DPT   Time Calculation: 26 mins

## 2019-07-30 PROBLEM — R65.21 SEPTIC SHOCK (HCC): Status: RESOLVED | Noted: 2019-07-27 | Resolved: 2019-07-30

## 2019-07-30 PROBLEM — A41.9 SEPTIC SHOCK (HCC): Status: RESOLVED | Noted: 2019-07-27 | Resolved: 2019-07-30

## 2019-07-30 LAB
ANION GAP SERPL CALC-SCNC: 6 MMOL/L (ref 5–15)
BASOPHILS # BLD: 0 K/UL (ref 0–0.1)
BASOPHILS NFR BLD: 0 % (ref 0–1)
BUN SERPL-MCNC: 4 MG/DL (ref 6–20)
BUN/CREAT SERPL: 8 (ref 12–20)
CALCIUM SERPL-MCNC: 8.5 MG/DL (ref 8.5–10.1)
CHLORIDE SERPL-SCNC: 108 MMOL/L (ref 97–108)
CO2 SERPL-SCNC: 27 MMOL/L (ref 21–32)
COMMENT, HOLDF: NORMAL
CREAT SERPL-MCNC: 0.48 MG/DL (ref 0.7–1.3)
DIFFERENTIAL METHOD BLD: ABNORMAL
EOSINOPHIL # BLD: 0 K/UL (ref 0–0.4)
EOSINOPHIL NFR BLD: 0 % (ref 0–7)
ERYTHROCYTE [DISTWIDTH] IN BLOOD BY AUTOMATED COUNT: 15.4 % (ref 11.5–14.5)
GLUCOSE SERPL-MCNC: 90 MG/DL (ref 65–100)
HCT VFR BLD AUTO: 30.1 % (ref 36.6–50.3)
HGB BLD-MCNC: 9.3 G/DL (ref 12.1–17)
IMM GRANULOCYTES # BLD AUTO: 0 K/UL
IMM GRANULOCYTES NFR BLD AUTO: 0 %
LYMPHOCYTES # BLD: 0.5 K/UL (ref 0.8–3.5)
LYMPHOCYTES NFR BLD: 23 % (ref 12–49)
MCH RBC QN AUTO: 23.7 PG (ref 26–34)
MCHC RBC AUTO-ENTMCNC: 30.9 G/DL (ref 30–36.5)
MCV RBC AUTO: 76.8 FL (ref 80–99)
MONOCYTES # BLD: 0.4 K/UL (ref 0–1)
MONOCYTES NFR BLD: 21 % (ref 5–13)
NEUTS SEG # BLD: 1.2 K/UL (ref 1.8–8)
NEUTS SEG NFR BLD: 56 % (ref 32–75)
NRBC # BLD: 0 K/UL (ref 0–0.01)
NRBC BLD-RTO: 0 PER 100 WBC
PLATELET # BLD AUTO: 188 K/UL (ref 150–400)
PMV BLD AUTO: 9.4 FL (ref 8.9–12.9)
POTASSIUM SERPL-SCNC: 3.4 MMOL/L (ref 3.5–5.1)
RBC # BLD AUTO: 3.92 M/UL (ref 4.1–5.7)
RBC MORPH BLD: ABNORMAL
SAMPLES BEING HELD,HOLD: NORMAL
SODIUM SERPL-SCNC: 141 MMOL/L (ref 136–145)
WBC # BLD AUTO: 2.1 K/UL (ref 4.1–11.1)

## 2019-07-30 PROCEDURE — 74011000258 HC RX REV CODE- 258: Performed by: INTERNAL MEDICINE

## 2019-07-30 PROCEDURE — 85025 COMPLETE CBC W/AUTO DIFF WBC: CPT

## 2019-07-30 PROCEDURE — 36415 COLL VENOUS BLD VENIPUNCTURE: CPT

## 2019-07-30 PROCEDURE — 74011250636 HC RX REV CODE- 250/636: Performed by: INTERNAL MEDICINE

## 2019-07-30 PROCEDURE — 80048 BASIC METABOLIC PNL TOTAL CA: CPT

## 2019-07-30 PROCEDURE — C9113 INJ PANTOPRAZOLE SODIUM, VIA: HCPCS | Performed by: INTERNAL MEDICINE

## 2019-07-30 PROCEDURE — 74011250637 HC RX REV CODE- 250/637: Performed by: INTERNAL MEDICINE

## 2019-07-30 PROCEDURE — 77030010545

## 2019-07-30 PROCEDURE — 65660000000 HC RM CCU STEPDOWN

## 2019-07-30 PROCEDURE — C9254 INJECTION, LACOSAMIDE: HCPCS | Performed by: INTERNAL MEDICINE

## 2019-07-30 PROCEDURE — 92526 ORAL FUNCTION THERAPY: CPT

## 2019-07-30 PROCEDURE — 97530 THERAPEUTIC ACTIVITIES: CPT

## 2019-07-30 RX ORDER — PANTOPRAZOLE SODIUM 40 MG/1
40 TABLET, DELAYED RELEASE ORAL
Status: DISCONTINUED | OUTPATIENT
Start: 2019-07-31 | End: 2019-07-31 | Stop reason: HOSPADM

## 2019-07-30 RX ORDER — POTASSIUM CHLORIDE 1.5 G/1.77G
40 POWDER, FOR SOLUTION ORAL
Status: COMPLETED | OUTPATIENT
Start: 2019-07-30 | End: 2019-07-30

## 2019-07-30 RX ADMIN — Medication 10 ML: at 17:59

## 2019-07-30 RX ADMIN — PANTOPRAZOLE SODIUM 40 MG: 40 INJECTION, POWDER, FOR SOLUTION INTRAVENOUS at 08:58

## 2019-07-30 RX ADMIN — DOCUSATE SODIUM 100 MG: 50 LIQUID ORAL at 08:58

## 2019-07-30 RX ADMIN — SODIUM CHLORIDE 100 MG: 900 INJECTION, SOLUTION INTRAVENOUS at 21:34

## 2019-07-30 RX ADMIN — DEXTROSE MONOHYDRATE AND SODIUM CHLORIDE 100 ML/HR: 5; .9 INJECTION, SOLUTION INTRAVENOUS at 17:57

## 2019-07-30 RX ADMIN — DEXTROSE MONOHYDRATE AND SODIUM CHLORIDE 100 ML/HR: 5; .9 INJECTION, SOLUTION INTRAVENOUS at 07:12

## 2019-07-30 RX ADMIN — DOCUSATE SODIUM 100 MG: 50 LIQUID ORAL at 17:59

## 2019-07-30 RX ADMIN — ENOXAPARIN SODIUM 40 MG: 40 INJECTION SUBCUTANEOUS at 21:34

## 2019-07-30 RX ADMIN — Medication 10 ML: at 21:35

## 2019-07-30 RX ADMIN — POTASSIUM CHLORIDE 40 MEQ: 1.5 POWDER, FOR SOLUTION ORAL at 08:57

## 2019-07-30 RX ADMIN — Medication 10 ML: at 05:28

## 2019-07-30 RX ADMIN — CEFTRIAXONE SODIUM 1 G: 1 INJECTION, POWDER, FOR SOLUTION INTRAMUSCULAR; INTRAVENOUS at 11:16

## 2019-07-30 RX ADMIN — SODIUM CHLORIDE 100 MG: 900 INJECTION, SOLUTION INTRAVENOUS at 09:04

## 2019-07-30 NOTE — PROGRESS NOTES
Chart Reviewed  Plan  1. Plan is for pt to return home with his parents and caregivers for assistance. 2.  Pt's parents have a handicapped accessible [place] [place] for transportation home.     Gilford Marinas, MSW

## 2019-07-30 NOTE — PROGRESS NOTES
0715 Pt arrived to the unit. Bedside shift change report given to Patito Brumfield (oncoming nurse) by Stephanie (offgoing nurse). Report included the following information SBAR, Kardex, Intake/Output, MAR, Recent Results and Cardiac Rhythm NSR.     0800 Lab notified of clotted CBC sample. 1020Lab notified of clotted CBC sample. 1800 Pt spit out small amount of emesis. Bedside shift change report given to Андрей Paige (oncoming nurse) by Patito Brumfield (offgoing nurse). Report included the following information SBAR, Kardex, Intake/Output, MAR, Recent Results and Cardiac Rhythm NSR.

## 2019-07-30 NOTE — ACP (ADVANCE CARE PLANNING)
ACP reviewed with his Mom. She states she is his legal guardian and will bring paperwork to be scanned into the chart.      Reviewed code status and patient remains full code

## 2019-07-30 NOTE — PROGRESS NOTES
[address] NP  [phone]           GI PROGRESS NOTE        NAME: Efren Templeton   :  1985   MRN:  [id number]       Subjective:   Non Verbal.  Nursing reports no further vomiting. He ate all of his breakfast this morning    Objective:   Sleeping in bed      VITALS:   Last 24hrs VS reviewed since prior progress note. Most recent are:  Visit Vitals  /67 (BP 1 Location: Left arm, BP Patient Position: At rest)   Pulse 95   Temp 97.6 °F (36.4 °C)   Resp 18   Ht 6' 2\" (1.88 m)   Wt 68 kg (149 lb 14.6 oz)   SpO2 95%   BMI 19.25 kg/m²       Intake/Output Summary (Last 24 hours) at 2019 1324  Last data filed at 2019 5053  Gross per 24 hour   Intake 2260 ml   Output 1300 ml   Net 960 ml       PHYSICAL EXAM:  General: Sleeping, in no acute distress    HEENT: Anicteric sclerae. Lungs:            CTA Bilaterally. Heart:  Regular  rhythm,    Abdomen: Soft, Non distended, Non tender.  (+)Bowel sounds, no HSM  Extremities: No c/c/e  Neurologic:  No acute neurological distress   Psych:   Not anxious nor agitated. Lab Data Reviewed:   Recent Labs     19  1103 19  0208   WBC 2.1* 3.2*   HGB 9.3* 8.2*   HCT 30.1* 27.2*    134*     Recent Labs     19  0636 19  0208    138   K 3.4* 3.3*    107   CO2 27 25   BUN 4* 8   CREA 0.48* 0.60*   GLU 90 74   PHOS  --  2.7   CA 8.5 7.7*     No results for input(s): SGOT, GPT, AP, TBIL, TP, ALB, GLOB, GGT, AML, LPSE in the last 72 hours. No lab exists for component: Julia Camacho    ________________________________________________________________________  Patient Active Problem List   Diagnosis Code    Pyelonephritis M32    Complicated UTI (urinary tract infection) N39.0    [facility] S06. 9X9A    Anemia D64.9    Renal insufficiency N28.9         Assessment and Plan:  Coffee Ground Emesis;   None further since hospitalization  - Continue diet per speech recommendations  - Changed to daily PPI  - Monitor Hemoglobin   - No NSAIDs     Constipation:    - Continue daily suppository  - Consider Miralax and Senna if above is ineffective    Will see again on request.  Please call with any questions or concerns.        Signed By: Rosana Main NP     7/30/2019  1:24 PM

## 2019-07-30 NOTE — PROGRESS NOTES
Problem: Mobility Impaired (Adult and Pediatric)  Goal: *Acute Goals and Plan of Care (Insert Text)  Description  Physical Therapy Goals  Initiated 7/26/2019  1. Patient will move from supine to sit and sit to supine  in bed with moderate assistance  within 7 day(s). 2.  Patient will transfer from bed to chair and chair to bed with moderate assistance  using the least restrictive device within 7 day(s). 3.  Patient will perform sit to stand with moderate assistance  within 7 day(s). 4.  Patient will ambulate with moderate assistance  for 5 feet with the least restrictive device within 7 day(s). Outcome: Progressing Towards Goal  PHYSICAL THERAPY TREATMENT  Patient: Dyan Roe (78 y.o. male)  Date: 7/30/2019  Diagnosis: Pyelonephritis [N12] Pyelonephritis       Precautions: Fall, Seizure, Skin  Chart, physical therapy assessment, plan of care and goals were reviewed. ASSESSMENT:  Pt Reggie Sandhu offers good participation though requires increased assistance for sitting edge of bed and demonstrates decreased activity tolerance. He demonstrates increased cervical posturing and L lean in sitting, and several instances of coughing while seated. Pt initially affirming desire to mobilize to chair however after sitting edge of bed x 5 mins he declines transfer. Discussed with RN. Progression toward goals:  ?    Improving appropriately and progressing toward goals  ? Improving slowly and progressing toward goals  ? Not making progress toward goals and plan of care will be adjusted     PLAN:  Patient continues to benefit from skilled intervention to address the above impairments. Continue treatment per established plan of care. Discharge Recommendations: To Be Determined  Further Equipment Recommendations for Discharge:  none      SUBJECTIVE:   Patient agreeable to PT; non-verbal but follows commands when physically able.     OBJECTIVE DATA SUMMARY:   Critical Behavior:  Neurologic State: Alert  Orientation Level: Unable to verbalize  Cognition: Unable to assess (comment)(TBI)  Safety/Judgement: Not assessed(due to impaired communication)  Functional Mobility Training:  Bed Mobility:     Supine to Sit: Additional time; Total assistance;Assist x2  Sit to Supine: Additional time; Total assistance;Assist x2  Scooting: Additional time; Total assistance                                        Balance:  Sitting: Impaired; requires maximum assistance for unsupported sitting, L lean, increased L cervical rotation and flexion today. Sitting - Static: Poor (constant support)  Sitting - Dynamic: Poor (constant support)  Ambulation/Gait Training:                                                                      Pain:  Pain Scale 1: Adult Nonverbal Pain Scale         0        Activity Tolerance:   Decreased today compared with yesterday. Please refer to the flowsheet for vital signs taken during this treatment. After treatment:   ?    Patient left in no apparent distress sitting up in chair  ? Patient left in no apparent distress in bed  ? Call bell left within reach  ? Nursing notified  ? Caregiver present  ?     Bed alarm activated    COMMUNICATION/COLLABORATION:   The patients plan of care was discussed with: Registered Nurse, Rehab attendant    Jennyfer Orantes PT, DPT   Time Calculation: 15 mins

## 2019-07-30 NOTE — WOUND CARE
Wound care consult:  Initial visit: Consulted for \"redness around the sacrum area. \"    Patient lying in bed, no distress. Caregiver at bedside. Assessment  All skin folds and bony prominences assessed, turned with staff assistance. Sacrum: blanching erythema noted. No open areas noted. Left medial ankle: Blanching erythema. No open areas noted. Heels and elbows intact. Treatment  Noted patient with condom catheter on  Repositioned in bed   Heels floated  Prevalon boots per bedside nurse. Recommendations/Plan  Low air-loss bed- Envision on [facility] ordered    Mobility team on board    Turn, reposition every 2 hours as tolerated, float heels, place in prevalon boots. Incontinent care with comfort shields. Apply Zinc to all open areas, moisture barrier as needed. Reconsult as needed.

## 2019-07-30 NOTE — PROGRESS NOTES
Nutrition Assessment:    RECOMMENDATIONS/INTERVENTION(S):   1. Continue with diet consistency per SLP     2. Will order Magic Cup TID, double vegetable servings, and yogurt TID per request.    3. Monitor PO intakes of meals/ONS, weight, GI for regular BM's. ASSESSMENT:   7/30: 29 yo male admitted for pyelonephritis. RD assessment for LOS. PMhx: TBI, non-verbal, seizures. Weight WNL per BMI per age. Pt's mother in room at time of visit, provided all information. States pt's weight stays around 145-150lbs, current weight charted as 149lbs. His max weight has been 160lbs. He usually has a very good appetite at home. Previously was on chopped diet with thin liquids at home but mother is familiar with using thickener as pt has been on thickened liquids in past.  Uses Simply Thick Gel at home. SLP following during this admission - pt is on recommended diet of Pureed with nectar thick liquids. PO intakes have been good: % meals. Mother requesting additional vegetable and beverage servings to help with his constipation, yogurts, and Magic Cups with meals. Pt requires full assistance with meals. GI was consulted secondary to coffee ground emesis that occurred 7/28 - no emesis since. Pt has had a BM. Labs reviewed. Meds: Dulcolax, Colace, Kcl. D5 NaCl running at 100ml/hr. Diet Order: Puree  % Eaten:    Patient Vitals for the past 72 hrs:   % Diet Eaten   07/27/19 1230 100 %       Pertinent Medications: [x] Reviewed    Labs: [x] Reviewed    Anthropometrics: Height: 6' 2\" (188 cm) Weight: 68 kg (149 lb 14.6 oz)    IBW (%IBW):   ( ) UBW (%UBW):   (  %)      BMI: Body mass index is 19.25 kg/m². This BMI is indicative of:   [] Underweight    [x] Normal    [] Overweight    []  Obesity    []  Extreme Obesity (BMI>40)  Estimated Nutrition Needs (Based on): 2210 Kcals/day(REE 1700 x AF 1.3) , 68 g(68-75gm (1-1.1gm/kg/d)) Protein  Carbohydrate:  At Least 130 g/day  Fluids: 2210 mL/day (1 ml/kcal)    Last BM: 7/29   [x]Active     []Hyperactive  []Hypoactive       [] Absent   BS  Skin:    [x] Intact   [] Incision  [] Breakdown   [] DTI   [] Tears/Excoriation/Abrasion  []Edema [] Other: Wt Readings from Last 30 Encounters:   07/30/19 68 kg (149 lb 14.6 oz)   07/23/19 68 kg (150 lb)   08/07/18 68 kg (150 lb)   05/01/18 65.8 kg (145 lb)   05/01/18 64.9 kg (143 lb)   02/14/17 64.9 kg (143 lb)      NUTRITION DIAGNOSES:   Problem:  Swallowing difficulty     Etiology: related to motor causes following TBI     Signs/Symptoms: as evidenced by abnormal swallow eval - need for Pureed diet with Nectar thick liquids per SLP      NUTRITION INTERVENTIONS:  Meals/Snacks: General/healthful diet   Supplements: Commercial supplement              GOAL:   Consume > 75% meals with no s/s of aspiration within next 4-6 days`    Cultural, Yazidism, or Ethnic Dietary Needs: None     EDUCATION & DISCHARGE NEEDS:    [x] None Identified   [] Identified and Education Provided/Documented   [] Identified and Pt declined/was not appropriate      [x] Interdisciplinary Care Plan Reviewed/Documented    [x] Discharge Needs:   Follow pureed diet with nectar thick liquids at home   [] No Nutrition Related Discharge Needs    NUTRITION RISK:   Pt Is At Nutrition Risk  [x]     No Nutrition Risk Identified  []       PT SEEN FOR:    []  MD Consult: []Calorie Count      []Diabetic Diet Education        []Diet Education     []Electrolyte Management     []General Nutrition Management and Supplements     []Management of Tube Feeding     []TPN Recommendations    []  RN Referral:  []MST score >=2     []Enteral/Parenteral Nutrition PTA     []Pregnant: Gestational DM or Multigestation                 [] Pressure Ulcer    []  Low BMI      [x]  Length of Stay       [] Dysphagia Diet         [] Ventilator  []  Follow-up     Previous Recommendations:   [] Implemented          [] Not Implemented          [x] Not Applicable    Previous Goal:   [] Met [] Progressing Towards Goal              [] Not Progressing Towards Goal   [x] Not Applicable            Erika Welch, [address] [place] [address]  Pager [phone]  Phone [phone]

## 2019-07-30 NOTE — PROGRESS NOTES
[address]fe[address]  [address] [place] [address], [address] [place] [address]  [phone]      Medical Progress Note      NAME: Efren Templeton   :  1985  MRM:  [id number]    Date/Time: 2019  8:10 AM          Subjective:     Chief Complaint:  Non-verbal: unable to obtain. Per Nursing, no further emesis    ROS:  (bold if positive, if negative)    Unable to obtain       Objective:       Vitals:          Last 24hrs VS reviewed since prior progress note.  Most recent are:    Visit Vitals  /72 (BP 1 Location: Right arm, BP Patient Position: Supine)   Pulse 86   Temp 97.5 °F (36.4 °C)   Resp 20   Wt 68 kg (150 lb)   SpO2 95%   BMI 19.26 kg/m²     SpO2 Readings from Last 6 Encounters:   19 95%   19 100%   18 99%   18 95%   18 91%   17 100%            Intake/Output Summary (Last 24 hours) at 2019 0810  Last data filed at 2019 0000  Gross per 24 hour   Intake 2300 ml   Output 2550 ml   Net -250 ml          Exam:     Physical Exam:    Gen:  Chronically ill-appearing, contracted, in no acute distress  HEENT:  Pink conjunctivae, PERRL, hearing intact to voice, moist mucous membranes  Neck:  Supple, without masses, thyroid non-tender  Resp:  No accessory muscle use, clear breath sounds without wheezes rales or rhonchi  Card:  No murmurs, normal S1, S2 without thrills, bruits or peripheral edema  Abd:  Soft, non-tender, non-distended, normoactive bowel sounds are present, no palpable organomegaly and no detectable hernias  Lymph:  No cervical or inguinal adenopathy  Musc:  No cyanosis or clubbing  Skin:  No rashes or ulcers, skin turgor is good  Neuro:  Cranial nerves are grossly intact, moves all extremities equally but has severe contractures in all 4 limbs, does not follow commands appropriately  Psych:  No insight, not oriented to person, place and time, alert       Telemetry reviewed:   sinus tach    Medications Reviewed: (see below)    Lab Data Reviewed: (see below)    ______________________________________________________________________    Medications:     Current Facility-Administered Medications   Medication Dose Route Frequency    potassium chloride (KLOR-CON) packet for solution 40 mEq  40 mEq Oral NOW    dextrose 5% and 0.9% NaCl infusion  100 mL/hr IntraVENous CONTINUOUS    cefTRIAXone (ROCEPHIN) 1 g in 0.9% sodium chloride (MBP/ADV) 50 mL  1 g IntraVENous Q24H    lacosamide (VIMPAT) 100 mg in 0.9% sodium chloride 100 mL IVPB  100 mg IntraVENous Q12H    HYDROmorphone (PF) (DILAUDID) injection 0.5 mg  0.5 mg IntraVENous Q4H PRN    bisacodyl (DULCOLAX) suppository 10 mg  10 mg Rectal EVERY OTHER DAY    prochlorperazine (COMPAZINE) injection 10 mg  10 mg IntraVENous Q6H PRN    pantoprazole (PROTONIX) 40 mg in sodium chloride 0.9% 10 mL injection  40 mg IntraVENous Q12H    docusate (COLACE) 50 mg/5 mL oral liquid 100 mg  100 mg Oral BID    ibuprofen (MOTRIN) tablet 600 mg  600 mg Oral Q6H PRN    PHENYLephrine (JAMIE-SYNEPHRINE) 30 mg in 0.9% sodium chloride 250 mL infusion   mcg/min IntraVENous TITRATE    albuterol-ipratropium (DUO-NEB) 2.5 MG-0.5 MG/3 ML  3 mL Nebulization Q4H PRN    sodium chloride (NS) flush 5-40 mL  5-40 mL IntraVENous Q8H    sodium chloride (NS) flush 5-40 mL  5-40 mL IntraVENous PRN    acetaminophen (TYLENOL) tablet 650 mg  650 mg Oral Q4H PRN    naloxone (NARCAN) injection 0.4 mg  0.4 mg IntraVENous PRN    diphenhydrAMINE (BENADRYL) injection 12.5 mg  12.5 mg IntraVENous Q4H PRN    ondansetron (ZOFRAN) injection 4 mg  4 mg IntraVENous Q6H PRN    enoxaparin (LOVENOX) injection 40 mg  40 mg SubCUTAneous Q24H    LORazepam (ATIVAN) injection 1 mg  1 mg IntraVENous Q6H PRN            Lab Review:     Recent Labs     07/29/19  0208 07/28/19  0321   WBC 3.2* 6.5   HGB 8.2* 9.0*   HCT 27.2* 29.3*   * 143*     Recent Labs     07/30/19  0636 07/29/19  0208 07/28/19  0321    138 139   K 3.4* 3.3* 3.1*  107 108   CO2 27 25 25   GLU 90 74 95   BUN 4* 8 10   CREA 0.48* 0.60* 0.41*   CA 8.5 7.7* 7.8*   MG  --  1.8  --    PHOS  --  2.7  --      Lab Results   Component Value Date/Time    Glucose (POC) 92 07/29/2019 06:17 AM    Glucose (POC) 70 07/29/2019 03:09 AM    Glucose (POC) 83 07/23/2019 03:47 PM    Glucose (POC) PLEASE DISREGARD RESULTS 02/13/2017 11:58 PM    Glucose (POC) 95 02/13/2017 11:58 PM     No results for input(s): PH, PCO2, PO2, HCO3, FIO2 in the last 72 hours. No results for input(s): INR in the last 72 hours.     No lab exists for component: INREXT, INREXT  No results found for: SDES  Lab Results   Component Value Date/Time    Culture result: NO GROWTH 5 DAYS 07/25/2019 09:03 PM    Culture result: PROTEUS MIRABILIS (A) 07/25/2019 07:30 PM    Culture result: ESCHERICHIA COLI (1,000 COLOINES/ML) (A) 07/25/2019 07:30 PM            Assessment:     Principal Problem:    Pyelonephritis (7/25/2019)    Active Problems:    Complicated UTI (urinary tract infection) (7/25/2019)      TBI (traumatic brain injury) ([facility] (7/25/2019)      Septic shock ([facility] (7/27/2019)      Anemia (7/28/2019)           Plan:     Principal Problem:    Pyelonephritis (7/25/2019)/Complicated UTI (urinary tract infection) (7/25/2019) due to chronic indwelling Grigsby catheter POA   - continue antibiotics as above   - urine culture with 60K Proteus, pan-sensistive, and 1K E.coli also pan-sensistive    Active Problems:    TBI (traumatic brain injury) ([facility] (7/25/2019)   - baseline mental status is non-verbal   - continue Vimpat for seizure d/o   - Palliative Care consult tomorrow      Septic shock ([facility] (7/27/2019)   - resolved, off pressors      Anemia (7/28/2019)   - follow Hgb per GI, no current plans for EGD   - advance diet as tolerated, Speech working with patient      Total time spent in patient care: 25 minutes                  Care Plan discussed with: Patient, Care Manager and Nursing Staff    Discussed:  Code Status, Care Plan and D/C Planning    Prophylaxis:  Lovenox    Disposition:   PT, OT, RN           ___________________________________________________    Attending Physician: Jayro Rodriguez MD

## 2019-07-30 NOTE — ROUTINE PROCESS
Verbal report was given to Dwight Carbajal by Boby Barney RN @ 1078. Report included SBAR, Kardex, Results Review, and Cardiac Rhythm. During this time, Boby Barney RN asked Mayte Ball RN if she had any questions and/or concerns; none were voiced by Mayte Ball RN.

## 2019-07-30 NOTE — PROGRESS NOTES
Problem: Dysphagia (Adult)  Goal: *Acute Goals and Plan of Care (Insert Text)  Description  Swallowing goals initiated 7-26-19:  1) tolerate dysphagia 1, thins without s/s aspiration by 7-29-19-discontinued: tolerate dyspahgia 1, nectars without s/s by 8-2-19  2) re-eval for solids once 24 hour caregivers present -met   Outcome: Progressing Towards Goal   SPEECH [address] TREATMENT  Patient: Ciera Smyth (12 y.o. male)  Date: 7/30/2019  Diagnosis: Pyelonephritis [N12] Pyelonephritis       Precautions: aspiration Fall, Seizure, Skin    ASSESSMENT:  Patient without vomiting for greater than 24 hours. No s/s aspiration today. PLAN:  Recommendations and Planned Interventions:  Ok to upgrade to dysphagia 1, nectars. Feed upright when awake and alert. Patient continues to benefit from skilled intervention to address the above impairments. Continue treatment per established plan of care. Discharge Recommendations: To Be Determined     SUBJECTIVE:   Patient interested in PO. OBJECTIVE:   Cognitive and Communication Status:  Neurologic State: Alert  Orientation Level: Unable to verbalize  Cognition: Impaired decision making, Follows commands  Perception: Cues to maintain midline in sitting, Cues to maintain midline in standing, Tactile, Verbal, Visual  Perseveration: No perseveration noted  Safety/Judgement: Not assessed(due to impaired communication)  Dysphagia Treatment:  Oral Assessment:     P.O. Trials:  Patient Position: up in bed  Vocal quality prior to P.O.: Aphonic  Consistency Presented: Nectar thick liquid  How Presented: SLP-fed/presented; Successive swallows;Straw   ORAL PHASE:   Ok with straw sips  Needed to control 2-3 sips at a time  Oral residue and drooling on L  PHARYNGEAL PHASE:   Initiation of Swallow: No impairment  Laryngeal Elevation: Functional  Aspiration Signs/Symptoms: None                      Exercises:  Laryngeal Exercises:               NO vomiting in over 24 hours. Pain:  Pain Scale 1: Adult Nonverbal Pain Scale        After treatment:   ?              Patient left in no apparent distress sitting up in chair  ? Patient left in no apparent distress in bed  ? Call bell left within reach  ? Nursing notified  ? Caregiver present-hospital sitter  ? Bed alarm activated    COMMUNICATION/EDUCATION:   Patient was educated regarding His deficit(s) of dysphagia  as this relates to His diagnosis of old TBI. He demonstrated Guarded understanding as evidenced by lack of ability to communicate. The patients plan of care including recommendations, planned interventions, and recommended diet changes were discussed with: Registered Nurse. ? Posted safety precautions in patient's room.     CATHLEEN Ohara  Time Calculation: 30 mins

## 2019-07-30 NOTE — PROGRESS NOTES
Palliative Medicine Consult  Jalen: [phone] ([phone])    Patient Name: Lesa Palencia  YOB: 1985    Date of Initial Consult: 7/29/19  Reason for Consult: Care decisions  Requesting Provider: Dr. Fahad Shafer  Primary Care Physician: Maged Steiner MD     SUMMARY:   Lesa Palencia is a 30 y.o. with a past history of traumatic brain injury while serving in [email or web address] in 2010 with  shunt, seizure disorder, who was admitted on 7/25/2019 from home with a diagnosis of sepsis/UTI. Current medical issues leading to Palliative Medicine involvement include: Care decisions. Chart reviewed/ history of present illness-patient is a 73-year-old male with a history of traumatic brain injury. Per the chart, this traumatic brain injury appeared to occur in 2010 while he was serving in [email or web address] in [facility]. He had a  shunt placed and appears to have a history of seizure disorder. In reviewing the chart, he is 100% VA disabled. He has caregivers at home through the day as well as a \" \"that assist with other activities during the week. He has been admitted for pyelonephritis with a complicated UTI. He does have a chronic indwelling Grigsby that makes him at risk for these issues. He apparently also had met emesis at admission but that appears to have resolved. Social historydiscussed with his aide at the bedside. He lives with his parents. Once again, per chart review, he has aides up to 14 hours a day     PALLIATIVE DIAGNOSES:   1. Goals of care discussion  2. Advanced care planning  3. History traumatic brain injury       PLAN:   1. Met with patient and his aide at the bedside. Patient continues to improve. His aide feels like at his baseline. Tolerated diet without further vomiting. 2. Message left for his mother yesterday without a return call. Our team will continue to follow peripherally but I suspect she may not want to talk with Palliative.    3. Goals of careI did review the advance care planning note by Dr. Ng Factor and family is clear on attempts at full restorative measures and ultimately returning home. I  left a message with patient's mother on 7/29 to see if there is a time we can talk/meet to have further discussions. 4. Advance care planning there is no copy of advanced medical directive in the chart. There is conflicting information in the chart as well with one note stating his father is his medical power of  and another area saying his mother's medical power of . Bottom line, unless an advanced medical directive was completed prior to his injury, his parents are equal legal next of kin's and with both have equal decision-making power on medical issues. We will attempt to ascertain if an advanced medical directive was completed. 5. CODE STATUSit appears the hospitalist had a discussion with his mother and he remains a full code  6. Symptom managementno acute symptoms for us to manage  7. Psychosocialno family at the bedside. Patient appears to have excellent support in the home. Once again, attempting to arrange a discussion with his mother here. 8. Discussed with bedside nurse. 9. Initial consult note routed to primary continuity provider and/or primary health care team members  10. Communicated plan of care with: Palliative IDT, [facility] Team    ADDENDUM:  Was able to talk with patient's mom. She is his legal guardian and she will bring paperwork so that can be scanned into chart. For now, she wants him to remain full code. Patient had shunt revision in early July and now this pyelonephritis. He has shown some recovery but did ask if he could be evaluated for inpatient rehab as he has responded well to that in the past. He does participate in outpatient rehab thru [facility] already. Have discussed with CM and placed PT/OT consults. Could not promise his mom inpatient rehab.       GOALS OF CARE / TREATMENT PREFERENCES:     GOALS OF CARE:  Patient/Health Care Proxy Stated Goals: Prolong life    TREATMENT PREFERENCES:   Code Status: Full Code    Advance Care Planning:  [] The [facility] Interdisciplinary Team has updated the ACP Navigator with [address] and Patient Capacity      Advance Care Planning 7/25/2019   Patient's Healthcare Decision Maker is: Legal Next of Kin   Confirm Advance Directive Yes, on file       Medical Interventions: Full interventions     Other Instructions: Other:    As far as possible, the palliative care team has discussed with patient / health care proxy about goals of care / treatment preferences for patient. HISTORY:     History obtained from: Chart, bedside aide    CHIEF COMPLAINT: Fever    HPI/SUBJECTIVE:    The patient is:   [] Verbal and participatory  [x] Non-participatory due to: Nonverbal secondary to traumatic brain injury     7/30-appears comfortable. Tolerated lunch  Clinical Pain Assessment (nonverbal scale for severity on nonverbal patients):   Clinical Pain Assessment  Severity: 0     Activity (Movement): Lying quietly, normal position    Duration: for how long has pt been experiencing pain (e.g., 2 days, 1 month, years)  Frequency: how often pain is an issue (e.g., several times per day, once every few days, constant)     FUNCTIONAL ASSESSMENT:     Palliative Performance Scale (PPS):  PPS: 30       PSYCHOSOCIAL/SPIRITUAL SCREENING:     Palliative IDT has assessed this patient for cultural preferences / practices and a referral made as appropriate to needs (Cultural Services, Patient Advocacy, Ethics, etc.)    Any spiritual / Congregational concerns:  [] Yes /  [x] No    Caregiver Burnout:  [] Yes /  [x] No /  [] No Caregiver Present      Anticipatory grief assessment:   [x] Normal  / [] Maladaptive       ESAS Anxiety: Anxiety: 0    ESAS Depression: Depression: 0        REVIEW OF SYSTEMS:     Positive and pertinent negative findings in ROS are noted above in HPI.   The following systems were [x] reviewed / [] unable to be reviewed as noted in HPI  Other findings are noted below. Systems: constitutional, ears/nose/mouth/throat, respiratory, gastrointestinal, genitourinary, musculoskeletal, integumentary, neurologic, psychiatric, endocrine. Positive findings noted below. Modified ESAS Completed by: provider   Fatigue: 1 Drowsiness: 0   Depression: 0 Pain: 0   Anxiety: 0 Nausea: 0   Anorexia: 0 Dyspnea: 0     Constipation: No     Stool Occurrence(s): 1        PHYSICAL EXAM:     From RN flowsheet:  Wt Readings from Last 3 Encounters:   07/30/19 149 lb 14.6 oz (68 kg)   07/23/19 150 lb (68 kg)   08/07/18 150 lb (68 kg)     Blood pressure 100/67, pulse 95, temperature 97.6 °F (36.4 °C), resp. rate 18, height 6' 2\" (1.88 m), weight 149 lb 14.6 oz (68 kg), SpO2 95 %. Pain Scale 1: Adult Nonverbal Pain Scale  Pain Intensity 1: 0              Pain Intervention(s) 1: Medication (see MAR)  Last bowel movement, if known:     Constitutional: Thin, no acute distress, nonverbal  Eyes: pupils equal, anicteric  ENMT: no nasal discharge, moist mucous membranes  Cardiovascular: regular rhythm, distal pulses intact  Respiratory: breathing not labored, symmetric  Gastrointestinal: soft non-tender, +bowel sounds  Musculoskeletal: no deformity, no tenderness to palpation  Skin: warm, dry  Neurologic: not following commands, moving all extremities, contractures in both upper and lower extremities  Psychiatric: Nonverbal, appears calm  Other:       HISTORY:     Principal Problem:    Pyelonephritis (7/25/2019)    Active Problems:    Complicated UTI (urinary tract infection) (7/25/2019)      TBI (traumatic brain injury) ([facility] [address] (7/25/2019)      Anemia (7/28/2019)      Past Medical History:   Diagnosis Date    Anemia 7/28/2019    Neurological disorder     traumatic brain injury    Seizures ([address]     TBI (traumatic brain injury) ([facility] [address]       No past surgical history on file.    Family History   Problem Relation Age of Onset    No Known Problems Other         reviewed. Patient does not know      History reviewed, no pertinent family history.   Social History     Tobacco Use    Smoking status: Never Smoker    Smokeless tobacco: Never Used   Substance Use Topics    Alcohol use: No     No Known Allergies   Current Facility-Administered Medications   Medication Dose Route Frequency    [START ON 7/31/2019] pantoprazole (PROTONIX) tablet 40 mg  40 mg Oral ACB    dextrose 5% and 0.9% NaCl infusion  100 mL/hr IntraVENous CONTINUOUS    cefTRIAXone (ROCEPHIN) 1 g in 0.9% sodium chloride (MBP/ADV) 50 mL  1 g IntraVENous Q24H    lacosamide (VIMPAT) 100 mg in 0.9% sodium chloride 100 mL IVPB  100 mg IntraVENous Q12H    HYDROmorphone (PF) (DILAUDID) injection 0.5 mg  0.5 mg IntraVENous Q4H PRN    bisacodyl (DULCOLAX) suppository 10 mg  10 mg Rectal EVERY OTHER DAY    prochlorperazine (COMPAZINE) injection 10 mg  10 mg IntraVENous Q6H PRN    docusate (COLACE) 50 mg/5 mL oral liquid 100 mg  100 mg Oral BID    ibuprofen (MOTRIN) tablet 600 mg  600 mg Oral Q6H PRN    PHENYLephrine (JAMIE-SYNEPHRINE) 30 mg in 0.9% sodium chloride 250 mL infusion   mcg/min IntraVENous TITRATE    albuterol-ipratropium (DUO-NEB) 2.5 MG-0.5 MG/3 ML  3 mL Nebulization Q4H PRN    sodium chloride (NS) flush 5-40 mL  5-40 mL IntraVENous Q8H    sodium chloride (NS) flush 5-40 mL  5-40 mL IntraVENous PRN    acetaminophen (TYLENOL) tablet 650 mg  650 mg Oral Q4H PRN    naloxone (NARCAN) injection 0.4 mg  0.4 mg IntraVENous PRN    diphenhydrAMINE (BENADRYL) injection 12.5 mg  12.5 mg IntraVENous Q4H PRN    ondansetron (ZOFRAN) injection 4 mg  4 mg IntraVENous Q6H PRN    enoxaparin (LOVENOX) injection 40 mg  40 mg SubCUTAneous Q24H    LORazepam (ATIVAN) injection 1 mg  1 mg IntraVENous Q6H PRN          LAB AND IMAGING FINDINGS:     Lab Results   Component Value Date/Time    WBC 2.1 (L) 07/30/2019 11:03 AM    HGB 9.3 (L) 07/30/2019 11:03 AM PLATELET 716 54/96/9295 11:03 AM     Lab Results   Component Value Date/Time    Sodium 141 07/30/2019 06:36 AM    Potassium 3.4 (L) 07/30/2019 06:36 AM    Chloride 108 07/30/2019 06:36 AM    CO2 27 07/30/2019 06:36 AM    BUN 4 (L) 07/30/2019 06:36 AM    Creatinine 0.48 (L) 07/30/2019 06:36 AM    Calcium 8.5 07/30/2019 06:36 AM    Magnesium 1.8 07/29/2019 02:08 AM    Phosphorus 2.7 07/29/2019 02:08 AM      Lab Results   Component Value Date/Time    AST (SGOT) 12 (L) 07/26/2019 02:16 AM    Alk. phosphatase 63 07/26/2019 02:16 AM    Protein, total 7.1 07/26/2019 02:16 AM    Albumin 2.9 (L) 07/26/2019 02:16 AM    Globulin 4.2 (H) 07/26/2019 02:16 AM     No results found for: INR, PTMR, PTP, PT1, PT2, APTT   No results found for: IRON, FE, TIBC, IBCT, PSAT, FERR   No results found for: PH, PCO2, PO2  No components found for: GLPOC   No results found for: CPK, CKMB             Total time: 35  Counseling / coordination time, spent as noted above: 35  > 50% counseling / coordination?: yes    Prolonged service was provided for  []30 min   []75 min in face to face time in the presence of the patient, spent as noted above. Time Start:   Time End:   Note: this can only be billed with 48392 (initial) or 21241 (follow up). If multiple start / stop times, list each separately.

## 2019-07-30 NOTE — PROGRESS NOTES
TRANSFER - IN REPORT:    Verbal report received from Olvin Ruff on Tricia Im  being received from ICU for routine progression of care      Report consisted of patients Situation, Background, Assessment and   Recommendations(SBAR). Information from the following report(s) SBAR and Kardex was reviewed with the receiving nurse. Opportunity for questions and clarification was provided. Assessment completed upon patients arrival to unit and care assumed. End of Shift Report[de-identified]  Bedside and Verbal shift change report given to [facility] RN (oncoming nurse) by Edis Tony RN (offgoing nurse). Report included the following information SBAR and Kardex.

## 2019-07-31 ENCOUNTER — HOSPITAL ENCOUNTER (OUTPATIENT)
Dept: REHABILITATION | Age: 34
End: 2019-08-14
Attending: PHYSICAL MEDICINE & REHABILITATION | Admitting: PHYSICAL MEDICINE & REHABILITATION

## 2019-07-31 VITALS
TEMPERATURE: 97.7 F | OXYGEN SATURATION: 92 % | HEIGHT: 74 IN | HEART RATE: 78 BPM | WEIGHT: 149.91 LBS | RESPIRATION RATE: 20 BRPM | SYSTOLIC BLOOD PRESSURE: 111 MMHG | DIASTOLIC BLOOD PRESSURE: 63 MMHG | BODY MASS INDEX: 19.24 KG/M2

## 2019-07-31 DIAGNOSIS — S06.9X9D TRAUMATIC BRAIN INJURY WITH LOSS OF CONSCIOUSNESS, SUBSEQUENT ENCOUNTER: Chronic | ICD-10-CM

## 2019-07-31 DIAGNOSIS — S06.9XAS POST-TRAUMATIC EPILEPSY (HCC): ICD-10-CM

## 2019-07-31 DIAGNOSIS — S06.9X9A INTRACRANIAL INJURY WITH LOSS OF CONSCIOUSNESS (HCC): ICD-10-CM

## 2019-07-31 DIAGNOSIS — G40.909 POST-TRAUMATIC EPILEPSY (HCC): ICD-10-CM

## 2019-07-31 LAB
ANION GAP SERPL CALC-SCNC: 7 MMOL/L (ref 5–15)
BACTERIA SPEC CULT: NORMAL
BUN SERPL-MCNC: 6 MG/DL (ref 6–20)
BUN/CREAT SERPL: 13 (ref 12–20)
CALCIUM SERPL-MCNC: 8.7 MG/DL (ref 8.5–10.1)
CHLORIDE SERPL-SCNC: 109 MMOL/L (ref 97–108)
CO2 SERPL-SCNC: 26 MMOL/L (ref 21–32)
CREAT SERPL-MCNC: 0.46 MG/DL (ref 0.7–1.3)
ERYTHROCYTE [DISTWIDTH] IN BLOOD BY AUTOMATED COUNT: 15.4 % (ref 11.5–14.5)
GLUCOSE SERPL-MCNC: 117 MG/DL (ref 65–100)
HCT VFR BLD AUTO: 29.5 % (ref 36.6–50.3)
HGB BLD-MCNC: 9.1 G/DL (ref 12.1–17)
MAGNESIUM SERPL-MCNC: 2 MG/DL (ref 1.6–2.4)
MCH RBC QN AUTO: 23.5 PG (ref 26–34)
MCHC RBC AUTO-ENTMCNC: 30.8 G/DL (ref 30–36.5)
MCV RBC AUTO: 76.2 FL (ref 80–99)
NRBC # BLD: 0 K/UL (ref 0–0.01)
NRBC BLD-RTO: 0 PER 100 WBC
PHOSPHATE SERPL-MCNC: 3 MG/DL (ref 2.6–4.7)
PLATELET # BLD AUTO: 125 K/UL (ref 150–400)
PMV BLD AUTO: 11.5 FL (ref 8.9–12.9)
POTASSIUM SERPL-SCNC: 3.9 MMOL/L (ref 3.5–5.1)
RBC # BLD AUTO: 3.87 M/UL (ref 4.1–5.7)
SERVICE CMNT-IMP: NORMAL
SODIUM SERPL-SCNC: 142 MMOL/L (ref 136–145)
WBC # BLD AUTO: 3.6 K/UL (ref 4.1–11.1)

## 2019-07-31 PROCEDURE — 74011000258 HC RX REV CODE- 258: Performed by: INTERNAL MEDICINE

## 2019-07-31 PROCEDURE — 74011250636 HC RX REV CODE- 250/636: Performed by: INTERNAL MEDICINE

## 2019-07-31 PROCEDURE — 80048 BASIC METABOLIC PNL TOTAL CA: CPT

## 2019-07-31 PROCEDURE — 97168 OT RE-EVAL EST PLAN CARE: CPT | Performed by: OCCUPATIONAL THERAPIST

## 2019-07-31 PROCEDURE — C9254 INJECTION, LACOSAMIDE: HCPCS | Performed by: INTERNAL MEDICINE

## 2019-07-31 PROCEDURE — 81001 URINALYSIS AUTO W/SCOPE: CPT

## 2019-07-31 PROCEDURE — 97530 THERAPEUTIC ACTIVITIES: CPT | Performed by: OCCUPATIONAL THERAPIST

## 2019-07-31 PROCEDURE — 85027 COMPLETE CBC AUTOMATED: CPT

## 2019-07-31 PROCEDURE — 36415 COLL VENOUS BLD VENIPUNCTURE: CPT

## 2019-07-31 PROCEDURE — 74011250637 HC RX REV CODE- 250/637: Performed by: INTERNAL MEDICINE

## 2019-07-31 PROCEDURE — 83735 ASSAY OF MAGNESIUM: CPT

## 2019-07-31 PROCEDURE — 74011250637 HC RX REV CODE- 250/637: Performed by: NURSE PRACTITIONER

## 2019-07-31 PROCEDURE — 84100 ASSAY OF PHOSPHORUS: CPT

## 2019-07-31 RX ORDER — FACIAL-BODY WIPES
10 EACH TOPICAL
Status: SHIPPED | COMMUNITY
Start: 2019-07-31

## 2019-07-31 RX ORDER — ASPIRIN 81 MG
100 TABLET, DELAYED RELEASE (ENTERIC COATED) ORAL
Status: SHIPPED | COMMUNITY
Start: 2019-07-31

## 2019-07-31 RX ORDER — CEPHALEXIN 500 MG/1
500 CAPSULE ORAL 3 TIMES DAILY
Qty: 21 CAP | Refills: 0 | Status: SHIPPED | OUTPATIENT
Start: 2019-07-31 | End: 2019-08-07

## 2019-07-31 RX ADMIN — DOCUSATE SODIUM 100 MG: 50 LIQUID ORAL at 09:15

## 2019-07-31 RX ADMIN — IBUPROFEN 600 MG: 600 TABLET ORAL at 12:47

## 2019-07-31 RX ADMIN — DEXTROSE MONOHYDRATE AND SODIUM CHLORIDE 100 ML/HR: 5; .9 INJECTION, SOLUTION INTRAVENOUS at 05:15

## 2019-07-31 RX ADMIN — ONDANSETRON 4 MG: 2 INJECTION INTRAMUSCULAR; INTRAVENOUS at 05:14

## 2019-07-31 RX ADMIN — SODIUM CHLORIDE 100 MG: 900 INJECTION, SOLUTION INTRAVENOUS at 09:14

## 2019-07-31 RX ADMIN — CEFTRIAXONE SODIUM 1 G: 1 INJECTION, POWDER, FOR SOLUTION INTRAMUSCULAR; INTRAVENOUS at 11:19

## 2019-07-31 RX ADMIN — PANTOPRAZOLE SODIUM 40 MG: 40 TABLET, DELAYED RELEASE ORAL at 07:30

## 2019-07-31 RX ADMIN — Medication 10 ML: at 05:15

## 2019-07-31 RX ADMIN — ACETAMINOPHEN 650 MG: 325 TABLET ORAL at 11:14

## 2019-07-31 RX ADMIN — BISACODYL 10 MG: 10 SUPPOSITORY RECTAL at 15:00

## 2019-07-31 NOTE — PALLIATIVE CARE DISCHARGE
Goals of Care/Treatment Preferences The Palliative Medicine team was consulted as part of your/your loved one's care in the hospital. Our team is a supportive service; we strive to relieve suffering and improve quality of life. We reviewed advance care planning information, which includes the following: 
Patient's [place] is[de-identified] Verbal statement (Legal Next of Kin remains as decision maker) Confirm Advance Directive: None Patient Would Like to Complete Advance Directive: Unable Does the patient have other document types: Guardianship Patient/Health Care Proxy Stated Goals: Prolong life We reviewed / discussed your code status as: Full Code Full Code means perform CPR in the event of cardiac arrest. 
    DNR means do NOT perform CPR in the event of cardiac arrest. 
    Partial Code means you have specific preferences, please discuss with your healthcare team. 
    Jordon Allen means this issue was not addressed / resolved during your stay Medical Interventions: Full interventions Because of the importance of this information, we are providing you with a printed copy to share with other healthcare providers after this hospitalization is complete.
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
## 2019-07-31 NOTE — ROUTINE PROCESS
Attempted to schedule PCP apt with Dr. Marce Henderson, practice requested that patient call to schedule apt.
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
## 2019-07-31 NOTE — PROGRESS NOTES
2:43 PM  [facility] has accepted the patient and can admit after 4:00. RN Pacheco Henao to call report to [phone]. Family notified of discharge plan. MURRAY Cortez        Pt's mother is requesting that a referral be sent to [facility]. I have sent in [employer]. If [facility] does not accept the patient, family is not interested in home health.  MURRAY Cortez

## 2019-07-31 NOTE — PROGRESS NOTES
Palliative Medicine Consult  Jalen: [phone] ([phone])    Patient Name: Freddy Pappas  YOB: 1985    Date of Initial Consult: 7/29/19  Reason for Consult: Care decisions  Requesting Provider: Dr. Walter Roque  Primary Care Physician: Chloe Rosario MD     SUMMARY:   Freddy Pappas is a 30 y.o. with a past history of traumatic brain injury while serving in [place] in 2010 with  shunt, seizure disorder, who was admitted on 7/25/2019 from home with a diagnosis of sepsis/UTI. Current medical issues leading to Palliative Medicine involvement include: Care decisions. Chart reviewed/ history of present illness-patient is a 31-year-old male with a history of traumatic brain injury. Per the chart, this traumatic brain injury appeared to occur in 2010 while he was serving in [place] in [facility]. He had a  shunt placed and appears to have a history of seizure disorder. In reviewing the chart, he is 100% VA disabled. He has caregivers at home through the day as well as a \" \"that assist with other activities during the week. He has been admitted for pyelonephritis with a complicated UTI. He does have a chronic indwelling Grigsby that makes him at risk for these issues. He apparently also had met emesis at admission but that appears to have resolved. Social historydiscussed with his aide at the bedside. He lives with his parents. Once again, per chart review, he has aides up to 14 hours a day     PALLIATIVE DIAGNOSES:   1. Goals of care discussion  2. Advanced care planning  3. History traumatic brain injury       PLAN:   1. Met with patient and his aide at the bedside. [facility] has accepted the patient and he will be leaving this afternoon. Appreciate assistance from CM, OT, PT, and speech as I know his mom was hopeful that [facility] would accept him. 2. Goals of carecontinue attempts at returning her son to baseline. He had recent  shunt revision. [facility] has agreed to accept him. 3. Advance care planning his mom states she has legal guardianship. We have asked for a copy for the chart. 4. CODE STATUShis mom wants him to remain full code after our discussion on 7/30  5. Symptom managementno acute symptoms for us to manage  6. Psychosocialno family at the bedside. Patient appears to have excellent support in the home with cargivers and . 7. Discussed with bedside nurse, CM, OT, Dr. Zaid Rodriguez  8. Initial consult note routed to primary continuity provider and/or primary health care team members  9. Communicated plan of care with: Palliative IDT, [facility] Team         GOALS OF CARE / TREATMENT PREFERENCES:     GOALS OF CARE:  Patient/Health Care Proxy Stated Goals: Prolong life    TREATMENT PREFERENCES:   Code Status: Full Code    Advance Care Planning:  [] The [facility] Interdisciplinary Team has updated the ACP Navigator with Azael and Patient Capacity      Advance Care Planning 7/31/2019   Patient's Healthcare Decision Maker is: Verbal statement (Legal Next of Kin remains as decision maker)   Confirm Advance Directive None   Patient Would Like to Complete Advance Directive Unable   Does the patient have other document types Guardianship       Medical Interventions: Full interventions     Other Instructions: Other:    As far as possible, the palliative care team has discussed with patient / health care proxy about goals of care / treatment preferences for patient. HISTORY:     History obtained from: Chart, bedside aide    CHIEF COMPLAINT: Fever    HPI/SUBJECTIVE:    The patient is:   [] Verbal and participatory  [x] Non-participatory due to: Nonverbal secondary to traumatic brain injury     7/30-appears comfortable.  Tolerated lunch    7/31-patient remains nonverbal. He has tolerated diet  Clinical Pain Assessment (nonverbal scale for severity on nonverbal patients):   Clinical Pain Assessment  Severity: 0     Activity (Movement): Lying quietly, normal position    Duration: for how long has pt been experiencing pain (e.g., 2 days, 1 month, years)  Frequency: how often pain is an issue (e.g., several times per day, once every few days, constant)     FUNCTIONAL ASSESSMENT:     Palliative Performance Scale (PPS):  PPS: 30       PSYCHOSOCIAL/SPIRITUAL SCREENING:     Palliative IDT has assessed this patient for cultural preferences / practices and a referral made as appropriate to needs (Cultural Services, Patient Advocacy, Ethics, etc.)    Any spiritual / Moravian concerns:  [] Yes /  [x] No    Caregiver Burnout:  [] Yes /  [x] No /  [] No Caregiver Present      Anticipatory grief assessment:   [x] Normal  / [] Maladaptive       ESAS Anxiety: Anxiety: 0    ESAS Depression: Depression: 0        REVIEW OF SYSTEMS:     Positive and pertinent negative findings in ROS are noted above in HPI. The following systems were [x] reviewed / [] unable to be reviewed as noted in HPI  Other findings are noted below. Systems: constitutional, ears/nose/mouth/throat, respiratory, gastrointestinal, genitourinary, musculoskeletal, integumentary, neurologic, psychiatric, endocrine. Positive findings noted below. Modified ESAS Completed by: provider   Fatigue: 1 Drowsiness: 0   Depression: 0 Pain: 0   Anxiety: 0 Nausea: 0   Anorexia: 0 Dyspnea: 0     Constipation: No     Stool Occurrence(s): 1        PHYSICAL EXAM:     From RN flowsheet:  Wt Readings from Last 3 Encounters:   07/30/19 149 lb 14.6 oz (68 kg)   07/23/19 150 lb (68 kg)   08/07/18 150 lb (68 kg)     Blood pressure 132/77, pulse (!) 102, temperature 97.8 °F (36.6 °C), resp. rate 20, height 6' 2\" (1.88 m), weight 149 lb 14.6 oz (68 kg), SpO2 97 %.     Pain Scale 1: Numeric (0 - 10)  Pain Intensity 1: 0              Pain Intervention(s) 1: Medication (see MAR)  Last bowel movement, if known:     Constitutional: Thin, no acute distress, nonverbal  Eyes: pupils equal, anicteric  ENMT: no nasal discharge, moist mucous membranes  Cardiovascular: regular rhythm, distal pulses intact  Respiratory: breathing not labored, symmetric  Gastrointestinal: soft non-tender, +bowel sounds  Musculoskeletal: no deformity, no tenderness to palpation  Skin: warm, dry  Neurologic: not following commands, moving all extremities, contractures in both upper and lower extremities  Psychiatric: Nonverbal, appears calm  Other:       HISTORY:     Principal Problem:    Pyelonephritis (7/25/2019)    Active Problems:    Complicated UTI (urinary tract infection) (7/25/2019)      TBI (traumatic [facility]) ([facility] (7/25/2019)      Anemia (7/28/2019)      Past Medical History:   Diagnosis Date    Anemia 7/28/2019    Neurological disorder     traumatic brain injury    Seizures ([facility]     TBI (traumatic brain [facility]) ([facility]       No past surgical history on file. Family History   Problem Relation Age of Onset    No Known Problems Other         reviewed. Patient does not know      History reviewed, no pertinent family history.   Social History     Tobacco Use    Smoking status: Never Smoker    Smokeless tobacco: Never Used   Substance Use Topics    Alcohol use: No     No Known Allergies   Current Facility-Administered Medications   Medication Dose Route Frequency    pantoprazole (PROTONIX) tablet 40 mg  40 mg Oral ACB    dextrose 5% and 0.9% NaCl infusion  100 mL/hr IntraVENous CONTINUOUS    cefTRIAXone (ROCEPHIN) 1 g in 0.9% sodium chloride (MBP/ADV) 50 mL  1 g IntraVENous Q24H    lacosamide (VIMPAT) 100 mg in 0.9% sodium chloride 100 mL IVPB  100 mg IntraVENous Q12H    HYDROmorphone (PF) (DILAUDID) injection 0.5 mg  0.5 mg IntraVENous Q4H PRN    bisacodyl (DULCOLAX) suppository 10 mg  10 mg Rectal EVERY OTHER DAY    prochlorperazine (COMPAZINE) injection 10 mg  10 mg IntraVENous Q6H PRN    docusate (COLACE) 50 mg/5 mL oral liquid 100 mg  100 mg Oral BID    ibuprofen (MOTRIN) tablet 600 mg  600 mg Oral Q6H PRN    PHENYLephrine (JAMIE-SYNEPHRINE) 30 mg in 0.9% sodium chloride 250 mL infusion   mcg/min IntraVENous TITRATE    albuterol-ipratropium (DUO-NEB) 2.5 MG-0.5 MG/3 ML  3 mL Nebulization Q4H PRN    sodium chloride (NS) flush 5-40 mL  5-40 mL IntraVENous Q8H    sodium chloride (NS) flush 5-40 mL  5-40 mL IntraVENous PRN    acetaminophen (TYLENOL) tablet 650 mg  650 mg Oral Q4H PRN    naloxone (NARCAN) injection 0.4 mg  0.4 mg IntraVENous PRN    diphenhydrAMINE (BENADRYL) injection 12.5 mg  12.5 mg IntraVENous Q4H PRN    ondansetron (ZOFRAN) injection 4 mg  4 mg IntraVENous Q6H PRN    enoxaparin (LOVENOX) injection 40 mg  40 mg SubCUTAneous Q24H    LORazepam (ATIVAN) injection 1 mg  1 mg IntraVENous Q6H PRN          LAB AND IMAGING FINDINGS:     Lab Results   Component Value Date/Time    WBC 3.6 (L) 07/31/2019 01:20 AM    HGB 9.1 (L) 07/31/2019 01:20 AM    PLATELET 879 (L) 95/91/7733 01:20 AM     Lab Results   Component Value Date/Time    Sodium 142 07/31/2019 01:20 AM    Potassium 3.9 07/31/2019 01:20 AM    Chloride 109 (H) 07/31/2019 01:20 AM    CO2 26 07/31/2019 01:20 AM    BUN 6 07/31/2019 01:20 AM    Creatinine 0.46 (L) 07/31/2019 01:20 AM    Calcium 8.7 07/31/2019 01:20 AM    Magnesium 2.0 07/31/2019 01:20 AM    Phosphorus 3.0 07/31/2019 01:20 AM      Lab Results   Component Value Date/Time    AST (SGOT) 12 (L) 07/26/2019 02:16 AM    Alk.  phosphatase 63 07/26/2019 02:16 AM    Protein, total 7.1 07/26/2019 02:16 AM    Albumin 2.9 (L) 07/26/2019 02:16 AM    Globulin 4.2 (H) 07/26/2019 02:16 AM     No results found for: INR, PTMR, PTP, PT1, PT2, APTT   No results found for: IRON, FE, TIBC, IBCT, PSAT, FERR   No results found for: PH, PCO2, PO2  No components found for: GLPOC   No results found for: CPK, CKMB             Total time: 15  Counseling / coordination time, spent as noted above: 15  > 50% counseling / coordination?: yes    Prolonged service was provided for  []30 min   []75 min in face to face time in the presence of the patient, spent as noted above. Time Start:   Time End:   Note: this can only be billed with 34901 (initial) or 48661 (follow up). If multiple start / stop times, list each separately.

## 2019-07-31 NOTE — DISCHARGE SUMMARY
Physician Discharge Summary     Patient ID:  Sofia Vyas  [id number]  29 y.o.  1985    Admit date: 7/25/2019    Discharge date: 7/31/2019    Admission Diagnoses: Pyelonephritis [N12]    Discharge Diagnoses:  Principal Diagnosis Pyelonephritis                                            Principal Problem:    Pyelonephritis (7/25/2019)    Active Problems:    Complicated UTI (urinary tract infection) (7/25/2019)      TBI (traumatic brain injury) ([facility] (7/25/2019)      Anemia (7/28/2019)         Resolved Problems:  Problem List as of 7/31/2019 Date Reviewed: 7/25/2019          Codes Class Noted - Resolved    Renal insufficiency ICD-10-CM: N28.9  ICD-9-CM: 593.9  7/29/2019 - Present        Anemia (Chronic) ICD-10-CM: D64.9  ICD-9-CM: 285.9  7/28/2019 - Present        * (Principal) Pyelonephritis ICD-10-CM: N12  ICD-9-CM: 590.80  7/25/2019 - Present        Complicated UTI (urinary tract infection) ICD-10-CM: N39.0  ICD-9-CM: 599.0  7/25/2019 - Present        TBI (traumatic brain injury) ([facility] (Chronic) ICD-10-CM: S06. 9X9A  ICD-9-CM: 854.00  7/25/2019 - Present        RESOLVED: Hypokalemia ICD-10-CM: E87.6  ICD-9-CM: 276.8  7/29/2019 - 7/29/2019        RESOLVED: Septic shock ([facility] ICD-10-CM: A41.9, R65.21  ICD-9-CM: 038.9, 785.52, 995.92  7/27/2019 - 7/30/2019        RESOLVED: Sepsis ([facility] ICD-10-CM: A41.9  ICD-9-CM: 038.9, 995.91  7/25/2019 - 7/27/2019                Hospital Course:   Mr. Milana Yoder was admitted to the Hospitalist Service on the 3rd floor for treatment of UTI/acute pyelonephritis due to a chronic indwelling Grigsby catheter POA. He was treated with IV antibiotics. Urine culture returned with Proteus and E.coli both pan-sensitive. He was continued on IV antibiotics because of intermittent problems with vomiting. This improved as his infection improved, however he did have questionable hematemesis at one point.   GI was consulted, Hgb remained stable and so they deferred and endoscopic work up at this time. He was active with PT/OT as able. He was discharged home with home health on 7/31/2019 in improved condition. PCP: Unique Gordon MD    Consults: GI, Urology and Palliative Care    Discharge Exam:  See my Progress Note from today. Disposition: home    Patient Instructions:   Current Discharge Medication List      START taking these medications    Details   bisacodyl (DULCOLAX) 10 mg suppository Insert 10 mg into rectum every other day. docusate (COLACE) 50 mg/5 mL liquid Take 10 mL by mouth two (2) times a day. cephALEXin (KEFLEX) 500 mg capsule Take 1 Cap by mouth three (3) times daily for 7 days. Qty: 21 Cap, Refills: 0         CONTINUE these medications which have NOT CHANGED    Details   diazePAM (VALIUM) 5 mg tablet Take 5 mg by mouth two (2) times daily as needed. lacosamide (VIMPAT) 100 mg tab tablet Take 1 Tab by mouth two (2) times a day. Max Daily Amount: 200 mg. Qty: 30 Tab, Refills: 0    Associated Diagnoses: Seizures ([address]            Activity: Activity as tolerated  Diet: Resume previous diet  Wound Care: None needed    Follow-up Information     Follow up With Specialties Details Why Contact Info    Unique Gordon MD Internal Medicine In 2 weeks Practice requests that patient call to schedule appointment  [address] [facility] [address] [phone]      Naseem Arana MD Gastroenterology  As needed for GI issues [address] [facility]  [phone]            35 minutes were spent on this discharge.     Signed:  Silvia Lanier MD  7/31/2019  12:15 PM

## 2019-07-31 NOTE — PROGRESS NOTES
Bedside shift change report given to [facility] (oncoming nurse) by Gautam Arteaga (offgoing nurse). Report included the following information SBAR, Kardex, Intake/Output, MAR, Recent Results and Cardiac Rhythm NSR.     0920 Pt vomited small amount of emesis. Dr Karina Freitas notified. 1630 TRANSFER - OUT REPORT:    Verbal report given to Alicia(name) on May Alexis  being transferred to [facility] arms(unit) for routine progression of care       Report consisted of patients Situation, Background, Assessment and   Recommendations(SBAR). Information from the following report(s) SBAR, Kardex, Intake/Output, MAR, Recent Results and Cardiac Rhythm NSR was reviewed with the receiving nurse. Lines:   Peripheral IV 07/27/19 Left;Proximal (Active)   Site Assessment Clean, dry, & intact 7/31/2019  7:30 AM   Phlebitis Assessment 0 7/31/2019  7:30 AM   Infiltration Assessment 0 7/31/2019  7:30 AM   Dressing Status Clean, dry, & intact 7/31/2019  7:30 AM   Dressing Type Transparent 7/31/2019  7:30 AM   Hub Color/Line Status Patent 7/31/2019  7:30 AM   Action Taken Open ports on tubing capped 7/30/2019  4:00 AM   Alcohol Cap Used Yes 7/30/2019  8:47 PM        Opportunity for questions and clarification was provided.       Patient transported with:   Employee Benefit Plans

## 2019-07-31 NOTE — PROGRESS NOTES
In preparation for discharge, I have completed AVS med-updates, Care Plans and Education in [facility]. Case Management said that PCP prefers for patient to call for appointment. Patient will be discharged home with his parents. Case Management will resume previous home health. Will continue to follow. 1640  A copy of the discharge papers was given to patient. Patient will be discharge to [place] [id number] instead of going home as originally planned. He will be transported by wheelchair and staff. Primary nurse [facility] has called report to receiving nurse at [place] [address]. She requested to leave on the condom catheter. Patient was not able to sign for his copy of the discharge papers and no family was present.

## 2019-07-31 NOTE — PROGRESS NOTES
Pharmacist Discharge Medication Reconciliation    Discharge Provider:  Jose Carlos Peralta MD         Discharge Medications:      My Medications        START taking these medications        Instructions Each Dose to Equal Morning Noon Evening Bedtime   bisacodyl 10 mg suppository  Commonly known as:  DULCOLAX  Your next dose is:  7/31  Notes to patient:  laxative       Insert 10 mg into rectum every other day. 10 mg         cephALEXin 500 mg capsule  Commonly known as:  Genny Steele  Your last dose was:  IV antibiotics given while in the hospital.  Your next dose is:  7/31  Notes to patient:  Antibiotic for infection      Take 1 Cap by mouth three (3) times daily for 7 days. 500 mg         docusate 50 mg/5 mL liquid  Commonly known as:  COLACE  Your next dose is:  7/31  Notes to patient:  stool softener      Take 10 mL by mouth two (2) times a day. 100 mg                CONTINUE taking these medications        Instructions Each Dose to Equal Morning Noon Evening Bedtime   diazePAM 5 mg tablet  Commonly known as:  VALIUM  Your last dose was:  Not given while in the hospital - resume as needed      Take 5 mg by mouth two (2) times daily as needed. 5 mg         lacosamide 100 mg Tab tablet  Commonly known as:  VIMPAT  Your next dose is:  7/31      Take 1 Tab by mouth two (2) times a day.  Max Daily Amount: 200 mg.   100 mg                   Where to Get Your Medications        These medications were sent to [address] AT [address] [place] [address] (hospitals  [address], [place] [address]      Hours:  24-hours Phone:  [phone]   cephALEXin 500 mg capsule            The patient's chart, MAR, and AVS were reviewed by   Luc Lozada, James Leavitt,   Contact: [phone]

## 2019-07-31 NOTE — DISCHARGE INSTRUCTIONS
HOSPITALIST DISCHARGE INSTRUCTIONS  NAME: Efren Templeton   :  1985   MRN:  [id number]     Date/Time:  2019 8:07 AM    ADMIT DATE: 2019     DISCHARGE DATE: 2019     DISCHARGE DIAGNOSIS:  UTI    MEDICATIONS:  · It is important that you take the medication exactly as they are prescribed. · Keep your medication in the bottles provided by the pharmacist and keep a list of the medication names, dosages, and times to be taken in your wallet. · Do not take other medications without consulting your doctor. Pain Management: per above medications    What to do at Home    Recommended diet:  Resume previous diet    Recommended activity: Activity as tolerated    If you experience any of the following symptoms then please call your primary care physician or return to the emergency room if you cannot get hold of your doctor:  Fever, chills, nausea, vomiting, diarrhea, change in mentation, falling, bleeding, shortness of breath    Follow Up: Follow-up Information     Follow up With Specialties Details Why Contact Info    Deb Reyna MD Internal Medicine In 2 weeks  [address] [facility] Nery Whittaker MD Gastroenterology  As needed for GI issues [address] [facility]  [phone]              Information obtained by :  I understand that if any problems occur once I am at home I am to contact my physician. I understand and acknowledge receipt of the instructions indicated above.                                                                                                                                            Physician's or R.N.'s Signature                                                                  Date/Time                                                                                                                                              Patient or Representative Signature Date/Time

## 2019-08-01 LAB
25(OH)D3 SERPL-MCNC: 34.9 NG/ML (ref 30–100)
ALBUMIN SERPL-MCNC: 3.2 G/DL (ref 3.5–5)
ALBUMIN/GLOB SERPL: 0.7 {RATIO} (ref 1.1–2.2)
ALP SERPL-CCNC: 70 U/L (ref 45–117)
ALT SERPL-CCNC: 45 U/L (ref 12–78)
ANION GAP SERPL CALC-SCNC: 5 MMOL/L (ref 5–15)
APPEARANCE UR: CLEAR
AST SERPL-CCNC: 41 U/L (ref 15–37)
BACTERIA URNS QL MICRO: NEGATIVE /HPF
BILIRUB SERPL-MCNC: 0.2 MG/DL (ref 0.2–1)
BILIRUB UR QL: NEGATIVE
BUN SERPL-MCNC: 11 MG/DL (ref 6–20)
BUN/CREAT SERPL: 19 (ref 12–20)
CALCIUM SERPL-MCNC: 8.8 MG/DL (ref 8.5–10.1)
CHLORIDE SERPL-SCNC: 104 MMOL/L (ref 97–108)
CO2 SERPL-SCNC: 31 MMOL/L (ref 21–32)
COLOR UR: ABNORMAL
CREAT SERPL-MCNC: 0.57 MG/DL (ref 0.7–1.3)
EPITH CASTS URNS QL MICRO: ABNORMAL /LPF
ERYTHROCYTE [DISTWIDTH] IN BLOOD BY AUTOMATED COUNT: 15.6 % (ref 11.5–14.5)
GLOBULIN SER CALC-MCNC: 4.6 G/DL (ref 2–4)
GLUCOSE SERPL-MCNC: 87 MG/DL (ref 65–100)
GLUCOSE UR STRIP.AUTO-MCNC: NEGATIVE MG/DL
HCT VFR BLD AUTO: 34.6 % (ref 36.6–50.3)
HGB BLD-MCNC: 10.4 G/DL (ref 12.1–17)
HGB UR QL STRIP: ABNORMAL
HYALINE CASTS URNS QL MICRO: ABNORMAL /LPF (ref 0–5)
KETONES UR QL STRIP.AUTO: NEGATIVE MG/DL
LEUKOCYTE ESTERASE UR QL STRIP.AUTO: NEGATIVE
MCH RBC QN AUTO: 23.3 PG (ref 26–34)
MCHC RBC AUTO-ENTMCNC: 30.1 G/DL (ref 30–36.5)
MCV RBC AUTO: 77.4 FL (ref 80–99)
NITRITE UR QL STRIP.AUTO: NEGATIVE
NRBC # BLD: 0 K/UL (ref 0–0.01)
NRBC BLD-RTO: 0 PER 100 WBC
PH UR STRIP: 7 [PH] (ref 5–8)
PLATELET # BLD AUTO: ABNORMAL K/UL (ref 150–400)
POTASSIUM SERPL-SCNC: 3.9 MMOL/L (ref 3.5–5.1)
PROT SERPL-MCNC: 7.8 G/DL (ref 6.4–8.2)
PROT UR STRIP-MCNC: NEGATIVE MG/DL
RBC # BLD AUTO: 4.47 M/UL (ref 4.1–5.7)
RBC #/AREA URNS HPF: ABNORMAL /HPF (ref 0–5)
SODIUM SERPL-SCNC: 140 MMOL/L (ref 136–145)
SP GR UR REFRACTOMETRY: 1.02 (ref 1–1.03)
UA: UC IF INDICATED,UAUC: ABNORMAL
UROBILINOGEN UR QL STRIP.AUTO: 0.2 EU/DL (ref 0.2–1)
WBC # BLD AUTO: 6 K/UL (ref 4.1–11.1)
WBC URNS QL MICRO: ABNORMAL /HPF (ref 0–4)

## 2019-08-01 PROCEDURE — 80053 COMPREHEN METABOLIC PANEL: CPT

## 2019-08-01 PROCEDURE — 85027 COMPLETE CBC AUTOMATED: CPT

## 2019-08-01 PROCEDURE — 82306 VITAMIN D 25 HYDROXY: CPT

## 2019-08-01 PROCEDURE — 36415 COLL VENOUS BLD VENIPUNCTURE: CPT

## 2019-08-02 LAB
APPEARANCE UR: ABNORMAL
BILIRUB UR QL: NEGATIVE
COLOR UR: ABNORMAL
GLUCOSE UR STRIP.AUTO-MCNC: NEGATIVE MG/DL
HGB UR QL STRIP: NEGATIVE
KETONES UR QL STRIP.AUTO: NEGATIVE MG/DL
LEUKOCYTE ESTERASE UR QL STRIP.AUTO: NEGATIVE
NITRITE UR QL STRIP.AUTO: NEGATIVE
PH UR STRIP: 7.5 [PH] (ref 5–8)
PROT UR STRIP-MCNC: NEGATIVE MG/DL
SP GR UR REFRACTOMETRY: 1.01 (ref 1–1.03)
UROBILINOGEN UR QL STRIP.AUTO: 0.2 EU/DL (ref 0.2–1)

## 2019-08-02 PROCEDURE — 95816 EEG AWAKE AND DROWSY: CPT | Performed by: NURSE PRACTITIONER

## 2019-08-02 PROCEDURE — 81003 URINALYSIS AUTO W/O SCOPE: CPT

## 2019-08-02 NOTE — CONSULTS
[facility]: [id number] [address] Neurology  [facility] [address] [place] [id number]    [facility] DAVID Hernandes          Name:   Enedelia Power record #: [id number]  Admission Date: 7/31/2019     Who Consulted: Dr. Agapito Miguel    Reason for Consult: Seizures    HISTORY OF PRESENT ILLNESS:     This is a 30 y.o. male who was admitted [facility] on 7/25/2019 for blood in urine, fevers. On 7/31/2019 he was transferred to [facility]. The Neurology Service is asked to evaluate for seizures. He has a history of TBI (2009), non-verbal, -shunt (repaired July 2019 at the [place] by Dr. Ashley Berry MD.), w/c dependence, seizure d/o for which he takes Vimpat 100 mg bid. He has been seen multiple times in our ED for seizures and gets his routine care at the [place]. Seizure History  · Age of onset:  [age]  · Last seizure:  7/23/2019  · Description of normal seizure:  rhythmic jerking and loss of consciousness  · Current AED's: Vimpat 100 mg bid   · Home neurologist:  Christelle Raymond 157:     CT Head: 7/23/2019: There are chronic bland bilateral frontal infarcts, left greater than right. There is craniectomy change in the right parietal lobe. Ventricular shunt is present. There is no acute intracranial hemorrhage. EKG: sinus tachycardia. Care Plan discussed with:  Patient x   Family    RN    Care Manager    Consultant/Specialist:         Thank you for allowing the Neurology Service the pleasure of participating in the care of your patient. This patient will be discussed with my collaborating care team physician, Dr. Dorita Stark, and he may have further recommendations regarding this patient's care      DAVID Crocker  ====================  Attending Attestation:         NEUROLOGY ATTENDING ADDENDUM:    August 2, 2019    Pt personally seen and examined, in conjunction with ALPHONSO Byers. Chart reviewed.     30 y.o. male with PMHx TBI (2009), non-verbal, hx of post-traumatic epilepsy (on Vimpat 100 mg BID, followed at [place]). Pt was had recent admission at [facility] (7-25 to 7-31) for Pyelonephritis, and then transferred to [place] [address] on afternoon of 7-31. Pts RN at [facility] reported that early yesterday (530 approx) pt had a witnessed 3 minute episode of jaw clenching, unresponsiveness. This was followed a few minutes later by a similar episode lasting another 3 minutes. Pt was given IM Versed at that point and has not had any further seizure-like episodes. At baseline, pt is described as non-verbal, eyes open, intermittent tremors in arms. No data found.       General:  Middle aged male, skull deformity from TBI   Head:  Skull deformity   Eyes:  Conjunctivae/corneas clear   Lungs:  Heart:  Not examined  Not examined   Extremities: Spastic quadriparesis   Skin: No rashes    Neurologic Exam       Language: nonverbal  Memory:  lethargic    Cranial Nerves:  I: smell Not tested   II: visual fields Cannot assess d/t lethargy   II: pupils Equal, round, reactive   II: optic disc Not examined   III,VII: ptosis none   III,IV,VI: extraocular muscles  normal   V: facial light touch sensation  Cannot assess d/t non-verbal   VII: facial muscle function  symmetric   VIII: hearing Doesn't follow commands   IX: soft palate elevation  Not examined   XI: sternocleidomastoid strength Doesn't follow commands   XII: tongue  Not examined      Motor: spastic quadriparesis; withdraws to nailbed pressure in all extremities but 2-3/5 in upper exts and 1-2/ 5 in lower exts  Sensory:  Cannot assess d/t non-verbal  Cerebellar: no resting tremors  Reflexes: 2+ biceps, trace patellars   Plantar response: not examined  Gait: pt non-ambulatory  Romberg pt non-ambulatory      Impression/ Plan    30 y.o. male with hx of TBI, spastic quadriparesis, post-traumatic epilepsy    2 witnessed episodes of seizure-like activity yesterday AM  Resolved after receiving Valium IM x 1  RN reports pt was back to baseline alertness this AM    Check EEG (no prior EEG on file/ in EMR)  Increase Vimpat to 150 mg BID  Recheck UA to rule out persistent UTI  Will follow up if any suggestion of subclinical seizures on EEG    Discussed with Rehab NP    Thank you for the consultation. Signed By: Timothy Deluna MD     August 2, 2019             Impression/ Plan:      1. Rule out seizure/spell:    · Seizure precautions  · CBC, urine, CMP all unremarkable on 8/1/2019  · EEG    · AED dosing:  Vimpat increased to 150 mg bid    2. Mobility:   · Has been OOB. · PT/OT to al for rehab    4. Diet:    · Does not need SLP     5. VTE Prophylaxes:   · Per primary team       Review of Systems:  Unable to preform as pt is nonverbal      Allergies:   No Known Allergies    Outpatient Meds  No current facility-administered medications on file prior to encounter. Current Outpatient Medications on File Prior to Encounter   Medication Sig Dispense Refill    bisacodyl (DULCOLAX) 10 mg suppository Insert 10 mg into rectum every other day.  docusate (COLACE) 50 mg/5 mL liquid Take 10 mL by mouth two (2) times a day.  cephALEXin (KEFLEX) 500 mg capsule Take 1 Cap by mouth three (3) times daily for 7 days. 21 Cap 0    diazePAM (VALIUM) 5 mg tablet Take 5 mg by mouth two (2) times daily as needed.  lacosamide (VIMPAT) 100 mg tab tablet Take 1 Tab by mouth two (2) times a day. Max Daily Amount: 200 mg. 30 Tab 0       Inpatient Meds            Past Medical History:   Diagnosis Date    Anemia 7/28/2019    Neurological disorder     traumatic brain injury    [facility]     TBI (traumatic [facility]) ([facility]        No past surgical history on file. family history includes No Known Problems in an other family member. reports that he has never smoked. He has never used smokeless tobacco. He reports that he does not drink alcohol or use drugs.                Lab Results (last 24 hrs)  No results found for this or any previous visit (from the past 24 hour(s)).

## 2019-08-05 LAB
ANION GAP SERPL CALC-SCNC: 2 MMOL/L (ref 5–15)
BUN SERPL-MCNC: 26 MG/DL (ref 6–20)
BUN/CREAT SERPL: 41 (ref 12–20)
CALCIUM SERPL-MCNC: 9.1 MG/DL (ref 8.5–10.1)
CHLORIDE SERPL-SCNC: 102 MMOL/L (ref 97–108)
CO2 SERPL-SCNC: 33 MMOL/L (ref 21–32)
CREAT SERPL-MCNC: 0.63 MG/DL (ref 0.7–1.3)
ERYTHROCYTE [DISTWIDTH] IN BLOOD BY AUTOMATED COUNT: 15.9 % (ref 11.5–14.5)
GLUCOSE SERPL-MCNC: 94 MG/DL (ref 65–100)
HCT VFR BLD AUTO: 34.8 % (ref 36.6–50.3)
HGB BLD-MCNC: 10.2 G/DL (ref 12.1–17)
MCH RBC QN AUTO: 23.3 PG (ref 26–34)
MCHC RBC AUTO-ENTMCNC: 29.3 G/DL (ref 30–36.5)
MCV RBC AUTO: 79.5 FL (ref 80–99)
NRBC # BLD: 0 K/UL (ref 0–0.01)
NRBC BLD-RTO: 0 PER 100 WBC
PLATELET # BLD AUTO: 256 K/UL (ref 150–400)
PMV BLD AUTO: 9.8 FL (ref 8.9–12.9)
POTASSIUM SERPL-SCNC: 3.9 MMOL/L (ref 3.5–5.1)
RBC # BLD AUTO: 4.38 M/UL (ref 4.1–5.7)
SODIUM SERPL-SCNC: 137 MMOL/L (ref 136–145)
WBC # BLD AUTO: 5.6 K/UL (ref 4.1–11.1)

## 2019-08-05 PROCEDURE — 80048 BASIC METABOLIC PNL TOTAL CA: CPT

## 2019-08-05 PROCEDURE — 36415 COLL VENOUS BLD VENIPUNCTURE: CPT

## 2019-08-05 PROCEDURE — 85027 COMPLETE CBC AUTOMATED: CPT

## 2019-08-06 ENCOUNTER — APPOINTMENT (OUTPATIENT)
Dept: GENERAL RADIOLOGY | Age: 34
End: 2019-08-06
Attending: PHYSICAL MEDICINE & REHABILITATION

## 2019-08-06 ENCOUNTER — DOCUMENTATION ONLY (OUTPATIENT)
Dept: NEUROLOGY | Age: 34
End: 2019-08-06

## 2019-08-06 PROCEDURE — 74230 X-RAY XM SWLNG FUNCJ C+: CPT

## 2019-08-06 NOTE — PROGRESS NOTES
EEG Report    Date of Service: 8/2/2019  Referring: Dr. Loree Tatum    An EEG is requested in this 60-year-old with epilepsy and breakthrough seizure to evaluate for blood from abnormality. Medications listed as Dulcolax Colace Keflex Valium    This tracing is obtained while the patient is noted to be in the awake state. During wakefulness there are brief intermittent runs of posteriorly dominant 9 cps activity seen over the left had a region. Posterior head rhythms are not as well-defined over the right although alpha range frequencies are attained. There is an apparent breach rhythm over the right hemisphere. Underlying the breech is higher voltage mixed frequency theta range activity with overriding beta    Hyperventilation not performed. Intermittent photic stimulation little alters the tracing    Interpretation  This EEG recorded during the awake state is abnormal secondary to an apparent breach rhythm over the right hemisphere which would be in keeping with this patient's history of craniotomy. No epileptiform abnormalities are seen. No evidence for ongoing seizure.     Ingrid Cabral MD

## 2019-08-08 LAB
ALBUMIN SERPL-MCNC: 2.9 G/DL (ref 3.5–5)
ALBUMIN/GLOB SERPL: 0.7 {RATIO} (ref 1.1–2.2)
ALP SERPL-CCNC: 58 U/L (ref 45–117)
ALT SERPL-CCNC: 24 U/L (ref 12–78)
ANION GAP SERPL CALC-SCNC: 6 MMOL/L (ref 5–15)
AST SERPL-CCNC: 11 U/L (ref 15–37)
BILIRUB SERPL-MCNC: 0.2 MG/DL (ref 0.2–1)
BUN SERPL-MCNC: 18 MG/DL (ref 6–20)
BUN/CREAT SERPL: 32 (ref 12–20)
CALCIUM SERPL-MCNC: 8.9 MG/DL (ref 8.5–10.1)
CHLORIDE SERPL-SCNC: 104 MMOL/L (ref 97–108)
CO2 SERPL-SCNC: 28 MMOL/L (ref 21–32)
CREAT SERPL-MCNC: 0.56 MG/DL (ref 0.7–1.3)
ERYTHROCYTE [DISTWIDTH] IN BLOOD BY AUTOMATED COUNT: 15.7 % (ref 11.5–14.5)
GLOBULIN SER CALC-MCNC: 4.4 G/DL (ref 2–4)
GLUCOSE SERPL-MCNC: 82 MG/DL (ref 65–100)
HCT VFR BLD AUTO: 31.9 % (ref 36.6–50.3)
HGB BLD-MCNC: 9.3 G/DL (ref 12.1–17)
MCH RBC QN AUTO: 23 PG (ref 26–34)
MCHC RBC AUTO-ENTMCNC: 29.2 G/DL (ref 30–36.5)
MCV RBC AUTO: 78.8 FL (ref 80–99)
NRBC # BLD: 0 K/UL (ref 0–0.01)
NRBC BLD-RTO: 0 PER 100 WBC
PLATELET # BLD AUTO: 143 K/UL (ref 150–400)
PMV BLD AUTO: 10.4 FL (ref 8.9–12.9)
POTASSIUM SERPL-SCNC: 4 MMOL/L (ref 3.5–5.1)
PROT SERPL-MCNC: 7.3 G/DL (ref 6.4–8.2)
RBC # BLD AUTO: 4.05 M/UL (ref 4.1–5.7)
SODIUM SERPL-SCNC: 138 MMOL/L (ref 136–145)
WBC # BLD AUTO: 6.1 K/UL (ref 4.1–11.1)

## 2019-08-08 PROCEDURE — 85027 COMPLETE CBC AUTOMATED: CPT

## 2019-08-08 PROCEDURE — 36415 COLL VENOUS BLD VENIPUNCTURE: CPT

## 2019-08-08 PROCEDURE — 80053 COMPREHEN METABOLIC PANEL: CPT

## 2019-08-12 LAB
ALBUMIN SERPL-MCNC: 3 G/DL (ref 3.5–5)
ALBUMIN/GLOB SERPL: 0.7 {RATIO} (ref 1.1–2.2)
ALP SERPL-CCNC: 60 U/L (ref 45–117)
ALT SERPL-CCNC: 17 U/L (ref 12–78)
ANION GAP SERPL CALC-SCNC: 7 MMOL/L (ref 5–15)
AST SERPL-CCNC: 13 U/L (ref 15–37)
BILIRUB DIRECT SERPL-MCNC: <0.1 MG/DL (ref 0–0.2)
BILIRUB SERPL-MCNC: 0.2 MG/DL (ref 0.2–1)
BUN SERPL-MCNC: 15 MG/DL (ref 6–20)
BUN/CREAT SERPL: 23 (ref 12–20)
CALCIUM SERPL-MCNC: 8.8 MG/DL (ref 8.5–10.1)
CHLORIDE SERPL-SCNC: 106 MMOL/L (ref 97–108)
CO2 SERPL-SCNC: 26 MMOL/L (ref 21–32)
CREAT SERPL-MCNC: 0.66 MG/DL (ref 0.7–1.3)
ERYTHROCYTE [DISTWIDTH] IN BLOOD BY AUTOMATED COUNT: 15.6 % (ref 11.5–14.5)
GLOBULIN SER CALC-MCNC: 4.4 G/DL (ref 2–4)
GLUCOSE SERPL-MCNC: 90 MG/DL (ref 65–100)
HCT VFR BLD AUTO: 33.8 % (ref 36.6–50.3)
HGB BLD-MCNC: 10 G/DL (ref 12.1–17)
MCH RBC QN AUTO: 23.4 PG (ref 26–34)
MCHC RBC AUTO-ENTMCNC: 29.6 G/DL (ref 30–36.5)
MCV RBC AUTO: 79 FL (ref 80–99)
NRBC # BLD: 0 K/UL (ref 0–0.01)
NRBC BLD-RTO: 0 PER 100 WBC
PLATELET # BLD AUTO: 255 K/UL (ref 150–400)
PMV BLD AUTO: 10 FL (ref 8.9–12.9)
POTASSIUM SERPL-SCNC: 4 MMOL/L (ref 3.5–5.1)
PROT SERPL-MCNC: 7.4 G/DL (ref 6.4–8.2)
RBC # BLD AUTO: 4.28 M/UL (ref 4.1–5.7)
SODIUM SERPL-SCNC: 139 MMOL/L (ref 136–145)
WBC # BLD AUTO: 4.4 K/UL (ref 4.1–11.1)

## 2019-08-12 PROCEDURE — 36415 COLL VENOUS BLD VENIPUNCTURE: CPT

## 2019-08-12 PROCEDURE — 80076 HEPATIC FUNCTION PANEL: CPT

## 2019-08-12 PROCEDURE — 85027 COMPLETE CBC AUTOMATED: CPT

## 2019-08-12 PROCEDURE — 80048 BASIC METABOLIC PNL TOTAL CA: CPT

## 2019-08-14 LAB
APPEARANCE UR: CLEAR
BACTERIA URNS QL MICRO: NEGATIVE /HPF
BILIRUB UR QL: NEGATIVE
COLOR UR: ABNORMAL
EPITH CASTS URNS QL MICRO: ABNORMAL /LPF
GLUCOSE UR STRIP.AUTO-MCNC: NEGATIVE MG/DL
HGB UR QL STRIP: NEGATIVE
KETONES UR QL STRIP.AUTO: NEGATIVE MG/DL
LEUKOCYTE ESTERASE UR QL STRIP.AUTO: NEGATIVE
MUCOUS THREADS URNS QL MICRO: ABNORMAL /LPF
NITRITE UR QL STRIP.AUTO: NEGATIVE
PH UR STRIP: 6 [PH] (ref 5–8)
PROT UR STRIP-MCNC: NEGATIVE MG/DL
RBC #/AREA URNS HPF: ABNORMAL /HPF (ref 0–5)
SP GR UR REFRACTOMETRY: 1.03 (ref 1–1.03)
UA: UC IF INDICATED,UAUC: ABNORMAL
UROBILINOGEN UR QL STRIP.AUTO: 0.2 EU/DL (ref 0.2–1)
WBC URNS QL MICRO: ABNORMAL /HPF (ref 0–4)

## 2019-08-14 PROCEDURE — 87086 URINE CULTURE/COLONY COUNT: CPT

## 2019-08-14 PROCEDURE — 81001 URINALYSIS AUTO W/SCOPE: CPT

## 2019-08-16 LAB
BACTERIA SPEC CULT: NORMAL
CC UR VC: NORMAL
SERVICE CMNT-IMP: NORMAL

## 2019-08-19 ENCOUNTER — APPOINTMENT (OUTPATIENT)
Dept: CT IMAGING | Age: 34
DRG: 698 | End: 2019-08-19
Attending: EMERGENCY MEDICINE
Payer: MEDICARE

## 2019-08-19 ENCOUNTER — APPOINTMENT (OUTPATIENT)
Dept: GENERAL RADIOLOGY | Age: 34
DRG: 698 | End: 2019-08-19
Attending: EMERGENCY MEDICINE
Payer: MEDICARE

## 2019-08-19 ENCOUNTER — HOSPITAL ENCOUNTER (INPATIENT)
Age: 34
LOS: 4 days | Discharge: HOME | DRG: 698 | End: 2019-08-23
Attending: EMERGENCY MEDICINE | Admitting: INTERNAL MEDICINE
Payer: MEDICARE

## 2019-08-19 DIAGNOSIS — N39.0 COMPLICATED UTI (URINARY TRACT INFECTION): ICD-10-CM

## 2019-08-19 DIAGNOSIS — A41.9 SEPSIS, DUE TO UNSPECIFIED ORGANISM: ICD-10-CM

## 2019-08-19 DIAGNOSIS — R56.9 SEIZURE (HCC): Primary | ICD-10-CM

## 2019-08-19 DIAGNOSIS — S06.9X9A TRAUMATIC BRAIN INJURY WITH LOSS OF CONSCIOUSNESS, INITIAL ENCOUNTER (HCC): Chronic | ICD-10-CM

## 2019-08-19 LAB
ALBUMIN SERPL-MCNC: 3.3 G/DL (ref 3.5–5)
ALBUMIN/GLOB SERPL: 0.7 {RATIO} (ref 1.1–2.2)
ALP SERPL-CCNC: 68 U/L (ref 45–117)
ALT SERPL-CCNC: 19 U/L (ref 12–78)
ANION GAP SERPL CALC-SCNC: 6 MMOL/L (ref 5–15)
APPEARANCE UR: CLEAR
AST SERPL-CCNC: 21 U/L (ref 15–37)
ATRIAL RATE: 98 BPM
BACTERIA URNS QL MICRO: ABNORMAL /HPF
BASOPHILS # BLD: 0 K/UL (ref 0–0.1)
BASOPHILS NFR BLD: 0 % (ref 0–1)
BILIRUB SERPL-MCNC: 0.3 MG/DL (ref 0.2–1)
BILIRUB UR QL: NEGATIVE
BUN SERPL-MCNC: 18 MG/DL (ref 6–20)
BUN/CREAT SERPL: 28 (ref 12–20)
CALCIUM SERPL-MCNC: 8.4 MG/DL (ref 8.5–10.1)
CALCULATED P AXIS, ECG09: 26 DEGREES
CALCULATED R AXIS, ECG10: 13 DEGREES
CALCULATED T AXIS, ECG11: 19 DEGREES
CHLORIDE SERPL-SCNC: 105 MMOL/L (ref 97–108)
CO2 SERPL-SCNC: 27 MMOL/L (ref 21–32)
COLOR UR: ABNORMAL
CREAT SERPL-MCNC: 0.64 MG/DL (ref 0.7–1.3)
DIAGNOSIS, 93000: NORMAL
DIFFERENTIAL METHOD BLD: ABNORMAL
EOSINOPHIL # BLD: 0 K/UL (ref 0–0.4)
EOSINOPHIL NFR BLD: 0 % (ref 0–7)
EPITH CASTS URNS QL MICRO: ABNORMAL /LPF
ERYTHROCYTE [DISTWIDTH] IN BLOOD BY AUTOMATED COUNT: 15.3 % (ref 11.5–14.5)
FOLATE SERPL-MCNC: 19.8 NG/ML (ref 5–21)
GLOBULIN SER CALC-MCNC: 4.9 G/DL (ref 2–4)
GLUCOSE BLD STRIP.AUTO-MCNC: 96 MG/DL (ref 65–100)
GLUCOSE SERPL-MCNC: 88 MG/DL (ref 65–100)
GLUCOSE UR STRIP.AUTO-MCNC: NEGATIVE MG/DL
HCT VFR BLD AUTO: 36 % (ref 36.6–50.3)
HGB BLD-MCNC: 10.6 G/DL (ref 12.1–17)
HGB UR QL STRIP: NEGATIVE
IMM GRANULOCYTES # BLD AUTO: 0 K/UL (ref 0–0.04)
IMM GRANULOCYTES NFR BLD AUTO: 0 % (ref 0–0.5)
KETONES UR QL STRIP.AUTO: NEGATIVE MG/DL
LACTATE BLD-SCNC: 1.29 MMOL/L (ref 0.4–2)
LEUKOCYTE ESTERASE UR QL STRIP.AUTO: ABNORMAL
LYMPHOCYTES # BLD: 0.6 K/UL (ref 0.8–3.5)
LYMPHOCYTES NFR BLD: 9 % (ref 12–49)
MCH RBC QN AUTO: 22.8 PG (ref 26–34)
MCHC RBC AUTO-ENTMCNC: 29.4 G/DL (ref 30–36.5)
MCV RBC AUTO: 77.4 FL (ref 80–99)
MONOCYTES # BLD: 0.3 K/UL (ref 0–1)
MONOCYTES NFR BLD: 5 % (ref 5–13)
MUCOUS THREADS URNS QL MICRO: ABNORMAL /LPF
NEUTS SEG # BLD: 5.7 K/UL (ref 1.8–8)
NEUTS SEG NFR BLD: 86 % (ref 32–75)
NITRITE UR QL STRIP.AUTO: NEGATIVE
NRBC # BLD: 0 K/UL (ref 0–0.01)
NRBC BLD-RTO: 0 PER 100 WBC
P-R INTERVAL, ECG05: 138 MS
PH UR STRIP: 6 [PH] (ref 5–8)
PLATELET # BLD AUTO: 161 K/UL (ref 150–400)
POTASSIUM SERPL-SCNC: 4.3 MMOL/L (ref 3.5–5.1)
PROT SERPL-MCNC: 8.2 G/DL (ref 6.4–8.2)
PROT UR STRIP-MCNC: ABNORMAL MG/DL
Q-T INTERVAL, ECG07: 344 MS
QRS DURATION, ECG06: 106 MS
QTC CALCULATION (BEZET), ECG08: 439 MS
RBC # BLD AUTO: 4.65 M/UL (ref 4.1–5.7)
RBC #/AREA URNS HPF: ABNORMAL /HPF (ref 0–5)
RBC MORPH BLD: ABNORMAL
SERVICE CMNT-IMP: NORMAL
SODIUM SERPL-SCNC: 138 MMOL/L (ref 136–145)
SP GR UR REFRACTOMETRY: 1.02 (ref 1–1.03)
TSH SERPL DL<=0.05 MIU/L-ACNC: 0.37 UIU/ML (ref 0.36–3.74)
UR CULT HOLD, URHOLD: NORMAL
UROBILINOGEN UR QL STRIP.AUTO: 0.2 EU/DL (ref 0.2–1)
VENTRICULAR RATE, ECG03: 98 BPM
VIT B12 SERPL-MCNC: 1067 PG/ML (ref 193–986)
WBC # BLD AUTO: 6.6 K/UL (ref 4.1–11.1)
WBC URNS QL MICRO: ABNORMAL /HPF (ref 0–4)

## 2019-08-19 PROCEDURE — 84443 ASSAY THYROID STIM HORMONE: CPT

## 2019-08-19 PROCEDURE — 74011000250 HC RX REV CODE- 250: Performed by: EMERGENCY MEDICINE

## 2019-08-19 PROCEDURE — 95951 EEG 24 HR W/ VIDEO: CPT | Performed by: NURSE PRACTITIONER

## 2019-08-19 PROCEDURE — 96375 TX/PRO/DX INJ NEW DRUG ADDON: CPT

## 2019-08-19 PROCEDURE — 87086 URINE CULTURE/COLONY COUNT: CPT

## 2019-08-19 PROCEDURE — C9254 INJECTION, LACOSAMIDE: HCPCS | Performed by: NURSE PRACTITIONER

## 2019-08-19 PROCEDURE — 99285 EMERGENCY DEPT VISIT HI MDM: CPT

## 2019-08-19 PROCEDURE — 74011250637 HC RX REV CODE- 250/637: Performed by: EMERGENCY MEDICINE

## 2019-08-19 PROCEDURE — 82962 GLUCOSE BLOOD TEST: CPT

## 2019-08-19 PROCEDURE — 82607 VITAMIN B-12: CPT

## 2019-08-19 PROCEDURE — 96365 THER/PROPH/DIAG IV INF INIT: CPT

## 2019-08-19 PROCEDURE — 74011250637 HC RX REV CODE- 250/637: Performed by: INTERNAL MEDICINE

## 2019-08-19 PROCEDURE — 82746 ASSAY OF FOLIC ACID SERUM: CPT

## 2019-08-19 PROCEDURE — 74011000258 HC RX REV CODE- 258: Performed by: INTERNAL MEDICINE

## 2019-08-19 PROCEDURE — 74011250636 HC RX REV CODE- 250/636: Performed by: EMERGENCY MEDICINE

## 2019-08-19 PROCEDURE — 74011250636 HC RX REV CODE- 250/636: Performed by: NURSE PRACTITIONER

## 2019-08-19 PROCEDURE — 87040 BLOOD CULTURE FOR BACTERIA: CPT

## 2019-08-19 PROCEDURE — 85025 COMPLETE CBC W/AUTO DIFF WBC: CPT

## 2019-08-19 PROCEDURE — C9254 INJECTION, LACOSAMIDE: HCPCS | Performed by: EMERGENCY MEDICINE

## 2019-08-19 PROCEDURE — 81001 URINALYSIS AUTO W/SCOPE: CPT

## 2019-08-19 PROCEDURE — 36415 COLL VENOUS BLD VENIPUNCTURE: CPT

## 2019-08-19 PROCEDURE — 65610000006 HC RM INTENSIVE CARE

## 2019-08-19 PROCEDURE — 71045 X-RAY EXAM CHEST 1 VIEW: CPT

## 2019-08-19 PROCEDURE — 80053 COMPREHEN METABOLIC PANEL: CPT

## 2019-08-19 PROCEDURE — 70450 CT HEAD/BRAIN W/O DYE: CPT

## 2019-08-19 PROCEDURE — 74011000258 HC RX REV CODE- 258: Performed by: NURSE PRACTITIONER

## 2019-08-19 PROCEDURE — 77030010547 HC BG URIN W/UMETER -A

## 2019-08-19 PROCEDURE — 74011250636 HC RX REV CODE- 250/636: Performed by: INTERNAL MEDICINE

## 2019-08-19 PROCEDURE — 83605 ASSAY OF LACTIC ACID: CPT

## 2019-08-19 PROCEDURE — 74011000258 HC RX REV CODE- 258: Performed by: EMERGENCY MEDICINE

## 2019-08-19 PROCEDURE — 93005 ELECTROCARDIOGRAM TRACING: CPT

## 2019-08-19 RX ORDER — ACETAMINOPHEN 650 MG/1
650 SUPPOSITORY RECTAL
Status: COMPLETED | OUTPATIENT
Start: 2019-08-19 | End: 2019-08-19

## 2019-08-19 RX ORDER — SODIUM CHLORIDE 0.9 % (FLUSH) 0.9 %
5-10 SYRINGE (ML) INJECTION AS NEEDED
Status: DISCONTINUED | OUTPATIENT
Start: 2019-08-19 | End: 2019-08-23 | Stop reason: HOSPADM

## 2019-08-19 RX ORDER — DANTROLENE SODIUM 100 MG/1
100 CAPSULE ORAL 4 TIMES DAILY
COMMUNITY

## 2019-08-19 RX ORDER — LORAZEPAM 2 MG/ML
1 INJECTION INTRAMUSCULAR
Status: DISCONTINUED | OUTPATIENT
Start: 2019-08-19 | End: 2019-08-23 | Stop reason: HOSPADM

## 2019-08-19 RX ORDER — DOCUSATE SODIUM 100 MG/1
100 CAPSULE, LIQUID FILLED ORAL
Status: DISCONTINUED | OUTPATIENT
Start: 2019-08-19 | End: 2019-08-23 | Stop reason: HOSPADM

## 2019-08-19 RX ORDER — BACLOFEN 10 MG/1
10 TABLET ORAL 2 TIMES DAILY
COMMUNITY
End: 2019-08-23

## 2019-08-19 RX ORDER — DIAZEPAM 10 MG/2ML
INJECTION INTRAMUSCULAR
Status: DISPENSED
Start: 2019-08-19 | End: 2019-08-19

## 2019-08-19 RX ORDER — DIAZEPAM 5 MG/1
2.5 TABLET ORAL
Status: DISCONTINUED | OUTPATIENT
Start: 2019-08-19 | End: 2019-08-23 | Stop reason: HOSPADM

## 2019-08-19 RX ORDER — IBUPROFEN 600 MG/1
600 TABLET ORAL
COMMUNITY

## 2019-08-19 RX ORDER — SODIUM CHLORIDE 0.9 % (FLUSH) 0.9 %
5-40 SYRINGE (ML) INJECTION AS NEEDED
Status: DISCONTINUED | OUTPATIENT
Start: 2019-08-19 | End: 2019-08-23 | Stop reason: HOSPADM

## 2019-08-19 RX ORDER — BACLOFEN 10 MG/1
10 TABLET ORAL 2 TIMES DAILY
Status: DISCONTINUED | OUTPATIENT
Start: 2019-08-19 | End: 2019-08-19

## 2019-08-19 RX ORDER — BROMOCRIPTINE MESYLATE 2.5 MG/1
7.5 TABLET ORAL 2 TIMES DAILY
Status: DISCONTINUED | OUTPATIENT
Start: 2019-08-19 | End: 2019-08-23 | Stop reason: HOSPADM

## 2019-08-19 RX ORDER — RANITIDINE 150 MG/1
150 TABLET, FILM COATED ORAL
COMMUNITY

## 2019-08-19 RX ORDER — DANTROLENE SODIUM 25 MG/1
100 CAPSULE ORAL 4 TIMES DAILY
Status: DISCONTINUED | OUTPATIENT
Start: 2019-08-19 | End: 2019-08-23 | Stop reason: HOSPADM

## 2019-08-19 RX ORDER — ACETAMINOPHEN 650 MG/1
650 SUPPOSITORY RECTAL
Status: DISCONTINUED | OUTPATIENT
Start: 2019-08-19 | End: 2019-08-23 | Stop reason: HOSPADM

## 2019-08-19 RX ORDER — SODIUM CHLORIDE 0.9 % (FLUSH) 0.9 %
5-40 SYRINGE (ML) INJECTION EVERY 8 HOURS
Status: DISCONTINUED | OUTPATIENT
Start: 2019-08-19 | End: 2019-08-23 | Stop reason: HOSPADM

## 2019-08-19 RX ORDER — SENNOSIDES 8.6 MG/1
2 TABLET ORAL
COMMUNITY

## 2019-08-19 RX ORDER — ACETAMINOPHEN 325 MG/1
650 TABLET ORAL
Status: DISCONTINUED | OUTPATIENT
Start: 2019-08-19 | End: 2019-08-19

## 2019-08-19 RX ORDER — DEXTROSE MONOHYDRATE AND SODIUM CHLORIDE 5; .9 G/100ML; G/100ML
100 INJECTION, SOLUTION INTRAVENOUS CONTINUOUS
Status: DISCONTINUED | OUTPATIENT
Start: 2019-08-19 | End: 2019-08-23 | Stop reason: HOSPADM

## 2019-08-19 RX ORDER — ENOXAPARIN SODIUM 100 MG/ML
40 INJECTION SUBCUTANEOUS EVERY 24 HOURS
Status: DISCONTINUED | OUTPATIENT
Start: 2019-08-19 | End: 2019-08-23 | Stop reason: HOSPADM

## 2019-08-19 RX ORDER — SENNOSIDES 8.6 MG/1
2 TABLET ORAL
Status: DISCONTINUED | OUTPATIENT
Start: 2019-08-19 | End: 2019-08-23 | Stop reason: HOSPADM

## 2019-08-19 RX ORDER — PANTOPRAZOLE SODIUM 40 MG/1
40 TABLET, DELAYED RELEASE ORAL DAILY
COMMUNITY

## 2019-08-19 RX ORDER — FACIAL-BODY WIPES
10 EACH TOPICAL EVERY OTHER DAY
Status: DISCONTINUED | OUTPATIENT
Start: 2019-08-19 | End: 2019-08-23 | Stop reason: HOSPADM

## 2019-08-19 RX ORDER — PANTOPRAZOLE SODIUM 40 MG/1
40 TABLET, DELAYED RELEASE ORAL
Status: DISCONTINUED | OUTPATIENT
Start: 2019-08-20 | End: 2019-08-23 | Stop reason: HOSPADM

## 2019-08-19 RX ADMIN — CEFEPIME HYDROCHLORIDE 2 G: 2 INJECTION, POWDER, FOR SOLUTION INTRAVENOUS at 14:41

## 2019-08-19 RX ADMIN — SODIUM CHLORIDE 100 MG: 900 INJECTION, SOLUTION INTRAVENOUS at 12:06

## 2019-08-19 RX ADMIN — ENOXAPARIN SODIUM 40 MG: 40 INJECTION SUBCUTANEOUS at 22:55

## 2019-08-19 RX ADMIN — BISACODYL 10 MG: 10 SUPPOSITORY RECTAL at 22:55

## 2019-08-19 RX ADMIN — SODIUM CHLORIDE 150 MG: 900 INJECTION, SOLUTION INTRAVENOUS at 21:29

## 2019-08-19 RX ADMIN — DEXTROSE MONOHYDRATE AND SODIUM CHLORIDE 125 ML/HR: 5; .9 INJECTION, SOLUTION INTRAVENOUS at 13:08

## 2019-08-19 RX ADMIN — SODIUM CHLORIDE 100 MG: 900 INJECTION, SOLUTION INTRAVENOUS at 10:58

## 2019-08-19 RX ADMIN — ACETAMINOPHEN 650 MG: 650 SUPPOSITORY RECTAL at 10:15

## 2019-08-19 RX ADMIN — CEFEPIME HYDROCHLORIDE 2 G: 2 INJECTION, POWDER, FOR SOLUTION INTRAVENOUS at 22:55

## 2019-08-19 RX ADMIN — ACETAMINOPHEN 650 MG: 650 SUPPOSITORY RECTAL at 14:29

## 2019-08-19 RX ADMIN — DEXTROSE MONOHYDRATE AND SODIUM CHLORIDE 125 ML/HR: 5; .9 INJECTION, SOLUTION INTRAVENOUS at 21:32

## 2019-08-19 RX ADMIN — CEFTRIAXONE SODIUM 1 G: 1 INJECTION, POWDER, FOR SOLUTION INTRAMUSCULAR; INTRAVENOUS at 10:22

## 2019-08-19 RX ADMIN — SODIUM CHLORIDE 1000 MG: 900 INJECTION, SOLUTION INTRAVENOUS at 11:32

## 2019-08-19 RX ADMIN — SODIUM CHLORIDE 1 ML: 900 INJECTION, SOLUTION INTRAVENOUS at 10:13

## 2019-08-19 RX ADMIN — SODIUM CHLORIDE 1000 ML: 900 INJECTION, SOLUTION INTRAVENOUS at 10:20

## 2019-08-19 RX ADMIN — SODIUM CHLORIDE 1000 ML: 900 INJECTION, SOLUTION INTRAVENOUS at 18:00

## 2019-08-19 NOTE — ED PROVIDER NOTES
Anson Melton is a 30 y.o. male who presents via EMS to the ED with a c/o multiple seizures today. Pt's mother reports that pt had his first seizure at approximately 4:10 this morning and she reports that it last for around 2 minutes. She then states that he had a second surgery at 6:10, which lasted 2 minutes and then a third seizure at 7:40. His mother states that these seizures were different from his prior seizures because with these, pt took a long, deep breath and his face started twitching, then his whole body began to shake. Pt sees Dr. Cheryl Melissa for Neurology and takes Vimpat for his seizures. Of note, pt's mother states that usually when pt has a seizure, it is a sign to an underlying condition; for example 1 month ago, pt had a seizure and was found to have a UTI and was also septic. Denies fever, chills, n/v/d or any other acute sx. PCP: Unique Gordon MD  PMHx significant for: Seizure Disorder, TBI, Anemia  PSHx significant for: Craniotomy  Social Hx: Tobacco: none EtOH: none Illicit drug use: none    There are no further complaints or symptoms at this time. Signed by: curtis Mendez for Fidel Christiansen. Yeyo Vines MD on  August 19th, 2019 at 8:47 AM.      The history is provided by a parent and medical records. No  was used. Past Medical History:   Diagnosis Date    Anemia 7/28/2019    Neurological disorder     traumatic brain injury    [facility]     TBI (traumatic [facility] injury) [facility]        Past Surgical History:   Procedure Laterality Date    HX CRANIOTOMY           Family History:   Problem Relation Age of Onset    No Known Problems Other         reviewed.   Patient does not know       Social History     Socioeconomic History    Marital status: SINGLE     Spouse name: Not on file    Number of children: Not on file    Years of education: Not on file    Highest education level: Not on file   Occupational History    Not on file   Social Needs  Financial resource strain: Not on file   Gonzalez-Hope insecurity:     Worry: Not on file     Inability: Not on file    Transportation needs:     Medical: Not on file     Non-medical: Not on file   Tobacco Use    Smoking status: Never Smoker    Smokeless tobacco: Never Used   Substance and Sexual Activity    Alcohol use: No    Drug use: No    Sexual activity: Not on file   Lifestyle    Physical activity:     Days per week: Not on file     Minutes per session: Not on file    Stress: Not on file   Relationships    Social connections:     Talks on phone: Not on file     Gets together: Not on file     Attends [employer]ism service: Not on file     Active member of club or organization: Not on file     Attends meetings of clubs or organizations: Not on file     Relationship status: Not on file    Intimate partner violence:     Fear of current or ex partner: Not on file     Emotionally abused: Not on file     Physically abused: Not on file     Forced sexual activity: Not on file   Other Topics Concern    Not on file   Social History Narrative    Not on file         ALLERGIES: Patient has no known allergies. Review of Systems   Unable to perform ROS: Patient nonverbal   Neurological: Positive for seizures. All other systems reviewed and are negative. Vitals:    08/19/19 0854 08/19/19 0900   BP: 121/65    Pulse: (!) 103    Resp: 15    Temp:  (!) 100.9 °F (38.3 °C)   SpO2: 95%    Weight: 66.7 kg (147 lb)    Height: 6' 2\" (1.88 m)             Physical Exam   Constitutional: No distress. HENT:   Head: Normocephalic and atraumatic. Eyes: Pupils are equal, round, and reactive to light. Conjunctivae are normal. No scleral icterus. Neck: No tracheal deviation present. Cardiovascular: Normal rate and regular rhythm. Pulmonary/Chest: Effort normal and breath sounds normal. No stridor. No respiratory distress. Abdominal: Soft. He exhibits no distension. There is no tenderness.    Musculoskeletal: He exhibits no edema or deformity. Neurological: He is alert. He displays atrophy (chronic extremity atrophy). Minimally responsive, pt is non-verbal at baseline   Skin: Skin is warm and dry. Hot to touch   Psychiatric: He has a normal mood and affect. Nursing note and vitals reviewed. [facility]       Procedures      Hospitalist Catalina for Admission  11:11 AM    ED Room Number: ER03/03  Patient Name and age:  Gigi Hawkins 30 y.o.  male  Working Diagnosis:   1. Seizure ([address]    2. Sepsis, due to unspecified organism [facility])      Readmission: no  Isolation Requirements:  no  Recommended Level of Care:  telemetry  Code Status:  Full Code  Department:[facility] ED - [phone]  Other:  4 seizures this AM. Loaded with keppra. Neuro seeing now. LABORATORY TESTS:  Labs Reviewed   METABOLIC PANEL, COMPREHENSIVE - Abnormal; Notable for the following components:       Result Value    Creatinine 0.64 (*)     BUN/Creatinine ratio 28 (*)     Calcium 8.4 (*)     Albumin 3.3 (*)     Globulin 4.9 (*)     A-G Ratio 0.7 (*)     All other components within normal limits   URINALYSIS W/MICROSCOPIC - Abnormal; Notable for the following components:    Protein TRACE (*)     Leukocyte Esterase TRACE (*)     Bacteria 1+ (*)     Mucus 2+ (*)     All other components within normal limits   CBC WITH AUTOMATED DIFF - Abnormal; Notable for the following components:    HGB 10.6 (*)     HCT 36.0 (*)     MCV 77.4 (*)     MCH 22.8 (*)     MCHC 29.4 (*)     RDW 15.3 (*)     NEUTROPHILS 86 (*)     LYMPHOCYTES 9 (*)     ABS. LYMPHOCYTES 0.6 (*)     All other components within normal limits   URINE CULTURE HOLD SAMPLE   CULTURE, BLOOD   CULTURE, BLOOD   CULTURE, URINE   FOLATE   VITAMIN B12   TSH 3RD GENERATION   GLUCOSE, POC   POC LACTIC ACID       IMAGING RESULTS:  CT HEAD WO CONT   Final Result   IMPRESSION: Right ventriculostomy tube is unchanged.  Chronic encephalomalacia   left frontal and temporal lobes and right frontal lobe is unchanged. Postoperative changes are stable. No acute abnormality is identified. XR CHEST PORT   Final Result   IMPRESSION:   1. Persistent right pleural effusion which has decreased. 2. No pneumonia   3. When feasible recommend follow-up chest radiograph to better evaluate the   left lung apex as described above. MEDICATIONS GIVEN:  Medications   sodium chloride (NS) flush 5-10 mL (has no administration in time range)   sodium chloride 0.9 % bolus infusion 1,000 mL (0 mL IntraVENous IV Completed 8/19/19 1311)     Followed by   sodium chloride 0.9 % bolus infusion 1,000 mL (has no administration in time range)     Followed by   sodium chloride 0.9 % bolus infusion 1 mL (has no administration in time range)   diazePAM (VALIUM) 5 mg/mL injection (has no administration in time range)   lacosamide (VIMPAT) 150 mg in 0.9% sodium chloride 100 mL IVPB (has no administration in time range)   dextrose 5% and 0.9% NaCl infusion (125 mL/hr IntraVENous New Bag 8/19/19 1308)   acetaminophen (TYLENOL) suppository 650 mg (650 mg Rectal Given 8/19/19 1429)   cefepime (MAXIPIME) 2 g in 0.9% sodium chloride (MBP/ADV) 100 mL (2 g IntraVENous New Bag 8/19/19 1441)   acetaminophen (TYLENOL) suppository 650 mg (650 mg Rectal Given 8/19/19 1015)   cefTRIAXone (ROCEPHIN) 1 g in sterile water (preservative free) 10 mL IV syringe (1 g IntraVENous Given 8/19/19 1022)   levETIRAcetam (KEPPRA) 1,000 mg in 0.9% sodium chloride 100 mL IVPB (0 mg IntraVENous IV Completed 8/19/19 1311)   lacosamide (VIMPAT) 100 mg in 0.9% sodium chloride 100 mL IVPB (100 mg IntraVENous Given 8/19/19 1058)   lacosamide (VIMPAT) 100 mg in 0.9% sodium chloride 100 mL IVPB (100 mg IntraVENous Given 8/19/19 1206)       IMPRESSION:  1. Seizure ([address]    2. Sepsis, due to unspecified organism ([address]        PLAN:  1. Admit to hospitalist    Total critical care time spent exclusive of procedures:  33 minutes      Renan Sargent, MD

## 2019-08-19 NOTE — PROGRESS NOTES
Bedside and Verbal shift change report given to Onel Pate RN (oncoming nurse) by Randal Robledo RN (offgoing nurse). Report included the following information SBAR, Kardex, MAR, Recent Results and Cardiac Rhythm NSR.
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
## 2019-08-19 NOTE — ED NOTES
Pt having active seizure. Eyes rolled backwards. Arms and face shaking. Seizure lasted 25 seconds. Dr. Snow Triana notified and came to bedside. Verbally ordered valium then verbally discontinued the order. Small amount of oral secretions suctioned. Postictal patient sleeping.

## 2019-08-19 NOTE — PROGRESS NOTES
TRANSFER - IN REPORT:    Verbal report received from Celine Slade RN(name) on Jocelyn Tillman  being received from ED(unit) for routine progression of care      Report consisted of patients Situation, Background, Assessment and   Recommendations(SBAR). Information from the following report(s) SBAR, Kardex, ED Summary, [facility] and Recent Results was reviewed with the receiving nurse. Opportunity for questions and clarification was provided. Assessment completed upon patients arrival to unit and care assumed.

## 2019-08-19 NOTE — ED NOTES
Rectal temp = 100.5. Dr Darwin Pate notified.  Verbal order received to change tylenol from PO to rectal.

## 2019-08-19 NOTE — PROGRESS NOTES
Pharmacy Dosing Note    Ordered regimen: Cefepime 2 g IV every 12 hours  New regimen: Change to cefepime 2 g IV every 8 hours for CrCl greater than 60 mL/min per [facility] Protocol. Estimated Creatinine Clearance: 156.3 mL/min (A) (based on SCr of 0.64 mg/dL (L)).     Thank you,  tSar Pizano, PHARMD

## 2019-08-19 NOTE — PROGRESS NOTES
[facility]: MED RECONCILIATION    Comments/Recommendations:     Patient unable to confirm name, , allergies and preferred pharmacy Pt's mother provided med list  Per pta med list provided by mother pt takes baclofen, bromocriptine and pantoprazole at 4 am then takes vitamins (unspecified) dantrolene and lacosemide at 7 am, dantrolene at 10 am, 2 pm and 6 pm and pantoprazole at  pm, also takes lacosimide, bromocriptine, baclofen senna and docusate at bedtime    Medications added:     Baclofen  Bromocriptine  Dantrolene   Pantoprazole  Senna  Ibuprofen  Ranitidine     Medications removed:    none    Medications adjusted:    none    Information obtained from: Family Member - mother     Allergies: Patient has no known allergies. Prior to Admission Medications:     Prior to Admission Medications   Prescriptions Last Dose Informant Patient Reported? Taking?   baclofen (LIORESAL) 10 mg tablet  at Unknown time Parent Yes No   Sig: Take 10 mg by mouth two (2) times a day. bisacodyl (DULCOLAX) 10 mg suppository  at Unknown time Parent Yes No   Sig: Insert 10 mg into rectum every other day. bromocriptine (PARLODEL) 2.5 mg tablet  at Unknown time Parent Yes No   Sig: Take 7.5 mg by mouth two (2) times a day. dantrolene (DANTRIUM) 25 mg capsule  at Unknown time Parent Yes No   Sig: Take 100 mg by mouth four (4) times daily. diazePAM (VALIUM) 5 mg tablet  at Unknown time Parent Yes No   Sig: Take 2.5 mg by mouth every eight (8) hours as needed. docusate (COLACE) 50 mg/5 mL liquid 2019 at Unknown time Parent Yes Yes   Sig: Take 100 mg by mouth nightly. ibuprofen (MOTRIN) 600 mg tablet  at Unknown time Parent Yes No   Sig: Take 600 mg by mouth every six (6) hours as needed for Pain. lacosamide (VIMPAT) 100 mg tab tablet 2019 at Unknown time Parent No Yes   Sig: Take 1 Tab by mouth two (2) times a day.  Max Daily Amount: 200 mg.   pantoprazole (PROTONIX) 40 mg tablet  at Unknown time Parent Yes No   Sig: Take 40 mg by mouth two (2) times a day. raNITIdine (ZANTAC) 150 mg tablet  at Unknown time Parent Yes No   Sig: Take 150 mg by mouth two (2) times daily as needed for Indigestion. senna (SENNA) 8.6 mg tablet 8/18/2019 at Unknown time Parent Yes Yes   Sig: Take 2 Tabs by mouth nightly. Facility-Administered Medications: None           [employer] BILLY    Clinical Staff Pharmacist  [address] [place] [address]  [phone]

## 2019-08-19 NOTE — ED TRIAGE NOTES
Pt arrives via EMS s/p seizure. Pt with hx of seizure and \"brain injury\". Pt with three witnessed seizures this am with last one at [place] [address]. Mother states seizures were different than usual because last a shorter period of time 30s-2 minutes when usually last 5-6 minutes and started with facial twitching and spasticity traveled down body when usually originates in and has more focal movement in legs.
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
## 2019-08-19 NOTE — PROGRESS NOTES
1820: Patient arrives to ICU 16 from ED. Patient awake, eyes open spontaneously. Nods head appropriately. Follows commands. Patient contracted BUE. 100% on RA. NSR HR 90's on monitor. Continuous EEG in progress. Patient vomited small amt. Brown/clear emesis, about 5cc, able to suction out with yanker. [facility] bath complete. Condom cath applied, patient has not voided. VSS. No acute distress. Call bell in reach. Patient's mother at bedside. Primary Nurse Governor Torres and Kings Murillo RN performed a dual skin assessment on this patient No impairment noted  Bhupinder score is see flowsheet. Wander. Heels, blanchable, heel boots applied. Erythema, non-blanchable, to Left shoulder blade, open to air.

## 2019-08-19 NOTE — ED NOTES
TRANSFER - OUT REPORT:    Verbal report given to [address] RN(name) on Kathi Cramer  being transferred to ICU(unit) for routine progression of care       Report consisted of patients Situation, Background, Assessment and   Recommendations(SBAR). Information from the following report(s) SBAR, ED Summary, [facility] and Recent Results was reviewed with the receiving nurse. Lines:   Peripheral IV 08/19/19 Right Forearm (Active)   Site Assessment Clean, dry, & intact 8/19/2019 10:05 AM   Phlebitis Assessment 0 8/19/2019 10:05 AM   Infiltration Assessment 0 8/19/2019 10:05 AM   Dressing Type Tape;Transparent 8/19/2019 10:05 AM   Hub Color/Line Status Blue;Flushed;Patent 8/19/2019 10:05 AM   Action Taken Blood drawn 8/19/2019 10:05 AM        Opportunity for questions and clarification was provided.       Patient transported with:   Monitor  Registered Nurse

## 2019-08-19 NOTE — ED NOTES
Bedside and Verbal shift change report given to Sheila Bates RN (oncoming nurse) by Nick Mays RN (offgoing nurse). Report included the following information SBAR, ED Summary, Intake/Output and Recent Results.

## 2019-08-20 LAB
ANION GAP SERPL CALC-SCNC: 6 MMOL/L (ref 5–15)
ANION GAP SERPL CALC-SCNC: 6 MMOL/L (ref 5–15)
B PERT DNA SPEC QL NAA+PROBE: NOT DETECTED
BACTERIA SPEC CULT: NORMAL
BACTERIA SPEC CULT: NORMAL
BASOPHILS # BLD: 0 K/UL (ref 0–0.1)
BASOPHILS NFR BLD: 1 % (ref 0–1)
BUN SERPL-MCNC: 9 MG/DL (ref 6–20)
BUN SERPL-MCNC: 9 MG/DL (ref 6–20)
BUN/CREAT SERPL: 16 (ref 12–20)
BUN/CREAT SERPL: 17 (ref 12–20)
C PNEUM DNA SPEC QL NAA+PROBE: NOT DETECTED
CALCIUM SERPL-MCNC: 8.2 MG/DL (ref 8.5–10.1)
CALCIUM SERPL-MCNC: 8.4 MG/DL (ref 8.5–10.1)
CHLORIDE SERPL-SCNC: 108 MMOL/L (ref 97–108)
CHLORIDE SERPL-SCNC: 108 MMOL/L (ref 97–108)
CO2 SERPL-SCNC: 24 MMOL/L (ref 21–32)
CO2 SERPL-SCNC: 25 MMOL/L (ref 21–32)
CREAT SERPL-MCNC: 0.53 MG/DL (ref 0.7–1.3)
CREAT SERPL-MCNC: 0.57 MG/DL (ref 0.7–1.3)
DIFFERENTIAL METHOD BLD: ABNORMAL
EOSINOPHIL # BLD: 0 K/UL (ref 0–0.4)
EOSINOPHIL NFR BLD: 1 % (ref 0–7)
ERYTHROCYTE [DISTWIDTH] IN BLOOD BY AUTOMATED COUNT: 15.4 % (ref 11.5–14.5)
FLUAV H1 2009 PAND RNA SPEC QL NAA+PROBE: NOT DETECTED
FLUAV H1 RNA SPEC QL NAA+PROBE: NOT DETECTED
FLUAV H3 RNA SPEC QL NAA+PROBE: NOT DETECTED
FLUAV SUBTYP SPEC NAA+PROBE: NOT DETECTED
FLUBV RNA SPEC QL NAA+PROBE: NOT DETECTED
GLUCOSE SERPL-MCNC: 103 MG/DL (ref 65–100)
GLUCOSE SERPL-MCNC: 114 MG/DL (ref 65–100)
HADV DNA SPEC QL NAA+PROBE: NOT DETECTED
HCOV 229E RNA SPEC QL NAA+PROBE: NOT DETECTED
HCOV HKU1 RNA SPEC QL NAA+PROBE: NOT DETECTED
HCOV NL63 RNA SPEC QL NAA+PROBE: NOT DETECTED
HCOV OC43 RNA SPEC QL NAA+PROBE: NOT DETECTED
HCT VFR BLD AUTO: 31.3 % (ref 36.6–50.3)
HGB BLD-MCNC: 9.2 G/DL (ref 12.1–17)
HMPV RNA SPEC QL NAA+PROBE: NOT DETECTED
HPIV1 RNA SPEC QL NAA+PROBE: NOT DETECTED
HPIV2 RNA SPEC QL NAA+PROBE: NOT DETECTED
HPIV3 RNA SPEC QL NAA+PROBE: NOT DETECTED
HPIV4 RNA SPEC QL NAA+PROBE: NOT DETECTED
IMM GRANULOCYTES # BLD AUTO: 0 K/UL (ref 0–0.04)
IMM GRANULOCYTES NFR BLD AUTO: 0 % (ref 0–0.5)
LYMPHOCYTES # BLD: 0.6 K/UL (ref 0.8–3.5)
LYMPHOCYTES NFR BLD: 13 % (ref 12–49)
M PNEUMO DNA SPEC QL NAA+PROBE: NOT DETECTED
MCH RBC QN AUTO: 22.7 PG (ref 26–34)
MCHC RBC AUTO-ENTMCNC: 29.4 G/DL (ref 30–36.5)
MCV RBC AUTO: 77.3 FL (ref 80–99)
MONOCYTES # BLD: 0.7 K/UL (ref 0–1)
MONOCYTES NFR BLD: 16 % (ref 5–13)
NEUTS SEG # BLD: 3 K/UL (ref 1.8–8)
NEUTS SEG NFR BLD: 69 % (ref 32–75)
NRBC # BLD: 0 K/UL (ref 0–0.01)
NRBC BLD-RTO: 0 PER 100 WBC
PLATELET # BLD AUTO: ABNORMAL K/UL (ref 150–400)
POTASSIUM SERPL-SCNC: 3.1 MMOL/L (ref 3.5–5.1)
POTASSIUM SERPL-SCNC: 3.1 MMOL/L (ref 3.5–5.1)
RBC # BLD AUTO: 4.05 M/UL (ref 4.1–5.7)
RBC MORPH BLD: ABNORMAL
RSV RNA SPEC QL NAA+PROBE: NOT DETECTED
RV+EV RNA SPEC QL NAA+PROBE: NOT DETECTED
SERVICE CMNT-IMP: NORMAL
SODIUM SERPL-SCNC: 138 MMOL/L (ref 136–145)
SODIUM SERPL-SCNC: 139 MMOL/L (ref 136–145)
WBC # BLD AUTO: 4.3 K/UL (ref 4.1–11.1)

## 2019-08-20 PROCEDURE — 74011000258 HC RX REV CODE- 258: Performed by: INTERNAL MEDICINE

## 2019-08-20 PROCEDURE — 36415 COLL VENOUS BLD VENIPUNCTURE: CPT

## 2019-08-20 PROCEDURE — C9254 INJECTION, LACOSAMIDE: HCPCS | Performed by: NURSE PRACTITIONER

## 2019-08-20 PROCEDURE — 80048 BASIC METABOLIC PNL TOTAL CA: CPT

## 2019-08-20 PROCEDURE — 74011250636 HC RX REV CODE- 250/636: Performed by: NURSE PRACTITIONER

## 2019-08-20 PROCEDURE — 74011250636 HC RX REV CODE- 250/636: Performed by: INTERNAL MEDICINE

## 2019-08-20 PROCEDURE — 74011250637 HC RX REV CODE- 250/637: Performed by: INTERNAL MEDICINE

## 2019-08-20 PROCEDURE — 74011000258 HC RX REV CODE- 258: Performed by: NURSE PRACTITIONER

## 2019-08-20 PROCEDURE — 85025 COMPLETE CBC W/AUTO DIFF WBC: CPT

## 2019-08-20 PROCEDURE — 92610 EVALUATE SWALLOWING FUNCTION: CPT

## 2019-08-20 PROCEDURE — 65660000000 HC RM CCU STEPDOWN

## 2019-08-20 PROCEDURE — 0099U RESPIRATORY PANEL,PCR,NASOPHARYNGEAL: CPT

## 2019-08-20 RX ORDER — POTASSIUM CHLORIDE 750 MG/1
40 TABLET, FILM COATED, EXTENDED RELEASE ORAL
Status: DISCONTINUED | OUTPATIENT
Start: 2019-08-20 | End: 2019-08-20

## 2019-08-20 RX ORDER — POTASSIUM CHLORIDE 7.45 MG/ML
10 INJECTION INTRAVENOUS
Status: COMPLETED | OUTPATIENT
Start: 2019-08-20 | End: 2019-08-20

## 2019-08-20 RX ADMIN — ACETAMINOPHEN 650 MG: 650 SUPPOSITORY RECTAL at 21:23

## 2019-08-20 RX ADMIN — POTASSIUM CHLORIDE 10 MEQ: 10 INJECTION, SOLUTION INTRAVENOUS at 08:43

## 2019-08-20 RX ADMIN — DEXTROSE MONOHYDRATE AND SODIUM CHLORIDE 125 ML/HR: 5; .9 INJECTION, SOLUTION INTRAVENOUS at 06:26

## 2019-08-20 RX ADMIN — POTASSIUM CHLORIDE 10 MEQ: 10 INJECTION, SOLUTION INTRAVENOUS at 09:44

## 2019-08-20 RX ADMIN — CEFEPIME HYDROCHLORIDE 2 G: 2 INJECTION, POWDER, FOR SOLUTION INTRAVENOUS at 16:12

## 2019-08-20 RX ADMIN — CEFEPIME HYDROCHLORIDE 2 G: 2 INJECTION, POWDER, FOR SOLUTION INTRAVENOUS at 22:50

## 2019-08-20 RX ADMIN — Medication 10 ML: at 14:00

## 2019-08-20 RX ADMIN — DANTROLENE SODIUM 100 MG: 25 CAPSULE ORAL at 17:16

## 2019-08-20 RX ADMIN — DOCUSATE SODIUM 100 MG: 100 CAPSULE, LIQUID FILLED ORAL at 21:23

## 2019-08-20 RX ADMIN — Medication 10 ML: at 21:23

## 2019-08-20 RX ADMIN — DANTROLENE SODIUM 100 MG: 25 CAPSULE ORAL at 12:48

## 2019-08-20 RX ADMIN — DANTROLENE SODIUM 100 MG: 25 CAPSULE ORAL at 21:22

## 2019-08-20 RX ADMIN — SODIUM CHLORIDE 150 MG: 900 INJECTION, SOLUTION INTRAVENOUS at 08:49

## 2019-08-20 RX ADMIN — POTASSIUM CHLORIDE 10 MEQ: 10 INJECTION, SOLUTION INTRAVENOUS at 10:56

## 2019-08-20 RX ADMIN — PANTOPRAZOLE SODIUM 40 MG: 40 TABLET, DELAYED RELEASE ORAL at 21:22

## 2019-08-20 RX ADMIN — SODIUM CHLORIDE 500 ML: 900 INJECTION, SOLUTION INTRAVENOUS at 23:10

## 2019-08-20 RX ADMIN — SODIUM CHLORIDE 150 MG: 900 INJECTION, SOLUTION INTRAVENOUS at 22:08

## 2019-08-20 RX ADMIN — CEFEPIME HYDROCHLORIDE 2 G: 2 INJECTION, POWDER, FOR SOLUTION INTRAVENOUS at 06:21

## 2019-08-20 RX ADMIN — POTASSIUM CHLORIDE 10 MEQ: 10 INJECTION, SOLUTION INTRAVENOUS at 11:50

## 2019-08-20 RX ADMIN — BROMOCRIPTINE MESYLATE 7.5 MG: 2.5 TABLET ORAL at 21:22

## 2019-08-20 RX ADMIN — SENNOSIDES 17.2 MG: 8.6 TABLET, FILM COATED ORAL at 21:22

## 2019-08-20 RX ADMIN — ENOXAPARIN SODIUM 40 MG: 40 INJECTION SUBCUTANEOUS at 21:22
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
## 2019-08-20 NOTE — PROGRESS NOTES
CVEEG reviewed through 730am  No ictus  Mixed theta and alpha  Apparent breech rhythm right  Ingrid Cabral MD

## 2019-08-20 NOTE — PROGRESS NOTES
2000 Care plan updated. Patient is on the turn team. The patient has eczema on the left shoulder and heals are red but blanching bilaterally. Primary Nurse Castro Steiner RN and Mil Valdez RN, performed a dual skin assessment on this patient No impairment noted  Bhupinder score is 11. Bedside and Verbal shift change report given to Vida Luis RN (oncoming nurse) by Mil Valdez RN (offgoing nurse). Report included the following information SBAR, Kardex, ED Summary, Procedure Summary, Intake/Output, MAR and Recent Results.

## 2019-08-20 NOTE — PROGRESS NOTES
Reason for Readmission:     Seizure,acute encephalopathy,complicated UTI         RRAT Score and Risk Level:  RRAT 12,moderate to high based on co-morbidities        Level of Readmission:   Level 1    Pt was admitted from 7/25 to 7/31 /19        Care Conference scheduled:   Not @ this time       Resources/supports as identified by patient/family:          Top Challenges facing patient (as identified by patient/family and CM): Finances/Medication cost?    Pt has [address] and    Transportation     To be discussed with father    Support system or lack thereof? Supportive Unique Arrieta Sr ([phone])  Living arrangements? With his parents in a handicapped accessible home      Self-care/ADLs/Cognition? Pt requires help with ADLS,Pt is non-verbal         Current Advanced Directive/Advance Care Plan:  Full code,no AMD on file           Plan for utilizing home health: Will follow for disposition needs             Transition of Care Plan:    Based on readmission, the patient's previous Plan of Care   has been evaluated and/or modified. The current Transition of Care Plan is:           Pt is a readmission. He was recently discharged home from [place] [address]  Pt was in rehab from 7/31/19 to 8/14/19 and then was discharged home. At home,pt has the following DME:wheelchair,hospital bed,BSC,shower chair and [place]ne lift. PCP is Dr Prema Cordoba who is in the [facility]. .Pt is 100% service connected. Pt has a traumatic brain injury and is non-verbal.    Pt resides with his parents  He has caregivers daily. Pharmacy of choice is [address] Pt is transferring to the 5th floor. Hand-off called to the 5th floor .     Lolay

## 2019-08-20 NOTE — PROGRESS NOTES
0700: Bedside shift change report given to Fatoumata CHARELS (oncoming nurse) by Hoang Cantrell (offgoing nurse). Report included the following information SBAR, Kardex, ED Summary, Intake/Output, MAR, Accordion, Recent Results and Cardiac Rhythm Sinus rhythm. Primary Nurse Fiona Pike, MELVIN and Isaiah Kelly RN performed a dual skin assessment on this patient Impairment noted- see wound doc flow sheet. Left shoulder blade abrasion and bilateral heels. Redness but blanching. 0730: Pt assessed, remains on RA, EEG continued, pt does not appear to be in pain at this time. Will track with eyes but no verbal response per pt baseline. VSS. Condom cath intact. 1200: [address] Natalee Manuel for pt to be remote telemetry status. Pt assessed by speech, diet advanced. PO medications given. 1400: Bedside and Verbal shift change report given to Giovana Castillo (oncoming nurse) by Ronald Peterson RN (offgoing nurse). Report included the following information SBAR, Kardex, Procedure Summary, Intake/Output, MAR, Recent Results and Cardiac Rhythm Sinus rhythm.

## 2019-08-20 NOTE — CDMP QUERY
Good Afternoon,    Pt admitted with seizures related to probable UTI on 8/19/19. Pt noted to have elevated HR of 104 and temperature of 100.9 at admission meeting 2 SIRS criteria. If possible, please document in the progress notes and d/c summary if you are evaluating and / or treating any of the following:    ==>Sepsis related to UTI due to chronic indwelling catheter POA  ==>Sepsis unrelated to device POA  ==>Other, please specify  ==>Clinically unable to determine    The medical record reflects the following:       Risk Factors: 30 Yr M with history of UTI, TBI and Seizures    Clinical Indicators: Patient's urinalysis completed on 8/19 shows trace leukocyte esterase, 2+ mucus and 1+ bacteria. Awaiting final culture. Patient's HR was 104 at admission on 8/19, as well as, elevated temperature of 100.9 noted. Within patient's H&P it states UTI likely d/t chronic leonardo catheter, SIRS likely to UTI, and seizure activity that may be triggered by infection. Treatment: Patient was started on cefepime 2G Q 8 hrs IV, urine culture pending and daily BMP.     Thanks,  Mily Bahena [facility] Shanel Hunter

## 2019-08-20 NOTE — PROGRESS NOTES
Bedside and Verbal shift change report given to Kathy Noyola (oncoming nurse) by Hermelinda Mckeon (offgoing nurse). Report included the following information SBAR, Kardex, Intake/Output, MAR, Recent Results and Cardiac Rhythm NSR.

## 2019-08-20 NOTE — CONSULTS
[facility]  Pulmonary, Critical Care, and Sleep Medicine    Name: Eryn Elkins MRN: [id number]   : 1985 Hospital: [address] [place] [address] Dr   Date: 2019        Critical Care Initial Patient Consult    IMPRESSION:   · Seizure  · H/o TBI  · Chronic R pleural effusion w/ rounded atx  · S/p IVC filter  · H/o  shunt  · Dysphagia  · H/o trach      RECOMMENDATIONS:   · Supplemental O2 as needed to keep sats > 90%. Currently on RA  · AEDs as per neuro - currently on vimpat  · IVF  · Follow cultures  · abx as per hospitalist  · Check RVP  · Replete lytes    DVT ppx: lovenox  GI ppx: protonix  Diet as per speech recs (dysphagia 1, NTL)    Stable for transfer out of the ICU     Subjective/History:     Seen as intensivist.    Mr. Agnieszka Phan is a 37yo male w/ history of seizure disorder, TBI, s/p  shunt, chronic leonardo, chronic R sided effusion w/ rounded atx and s/p IVC filter who presented to the ER on  for seizure activity. During a rehab stay earlier this month, his vimpat dose was increased (though not sent home on the increased dose) and baclofen added. He is normally followed at the [place]. Recent admission in July w/ pyelonephritis/UTI (cultures grew proteus and ecoli). Tm 100.9  UA not c/w infection  CXR w/ chronic R effusion (dates back to at least ).  - continuous EEG: no seizures          - CT head: no acute process    *all cultures NGTD        Past Medical History:   Diagnosis Date    Anemia 2019    Neurological disorder     traumatic brain injury    [facility]     TBI (traumatic brain injury) ([facility]       Past Surgical History:   Procedure Laterality Date    HX CRANIOTOMY        Prior to Admission medications    Medication Sig Start Date End Date Taking? Authorizing Provider   senna (SENNA) 8.6 mg tablet Take 2 Tabs by mouth nightly. Yes Provider, Historical   docusate (COLACE) 50 mg/5 mL liquid Take 100 mg by mouth nightly.  19  Yes Laina Sam MD   lacosamide (VIMPAT) 100 mg tab tablet Take 1 Tab by mouth two (2) times a day. Max Daily Amount: 200 mg. 5/1/18  Yes Ej Andres NP   baclofen (LIORESAL) 10 mg tablet Take 10 mg by mouth two (2) times a day. Provider, Historical   bromocriptine (PARLODEL) 2.5 mg tablet Take 7.5 mg by mouth two (2) times a day. Provider, Historical   dantrolene (DANTRIUM) 25 mg capsule Take 100 mg by mouth four (4) times daily. Provider, Historical   pantoprazole (PROTONIX) 40 mg tablet Take 40 mg by mouth two (2) times a day. Provider, Historical   ibuprofen (MOTRIN) 600 mg tablet Take 600 mg by mouth every six (6) hours as needed for Pain. Provider, Historical   raNITIdine (ZANTAC) 150 mg tablet Take 150 mg by mouth two (2) times daily as needed for Indigestion. Provider, Historical   bisacodyl (DULCOLAX) 10 mg suppository Insert 10 mg into rectum every other day. 7/31/19   Laina Sam MD   diazePAM (VALIUM) 5 mg tablet Take 2.5 mg by mouth every eight (8) hours as needed.     Provider, Historical     Current Facility-Administered Medications   Medication Dose Route Frequency    potassium chloride 10 mEq in 100 ml IVPB  10 mEq IntraVENous Q1H    lacosamide (VIMPAT) 150 mg in 0.9% sodium chloride 100 mL IVPB  150 mg IntraVENous Q12H    sodium chloride (NS) flush 5-40 mL  5-40 mL IntraVENous Q8H    enoxaparin (LOVENOX) injection 40 mg  40 mg SubCUTAneous Q24H    dextrose 5% and 0.9% NaCl infusion  100 mL/hr IntraVENous CONTINUOUS    bisacodyl (DULCOLAX) suppository 10 mg  10 mg Rectal EVERY OTHER DAY    bromocriptine (PARLODEL) tablet 7.5 mg  7.5 mg Oral BID    dantrolene (DANTRIUM) capsule 100 mg  100 mg Oral QID    docusate sodium (COLACE) capsule 100 mg  100 mg Oral QHS    pantoprazole (PROTONIX) tablet 40 mg  40 mg Oral ACB/HS    senna (SENOKOT) tablet 17.2 mg  2 Tab Oral QHS    cefepime (MAXIPIME) 2 g in 0.9% sodium chloride (MBP/ADV) 100 mL  2 g IntraVENous Q8H     No Known Allergies   Social History     Tobacco Use    Smoking status: Never Smoker    Smokeless tobacco: Never Used   Substance Use Topics    Alcohol use: No      Family History   Problem Relation Age of Onset    No Known Problems Other         reviewed. Patient does not know        Review of Systems:  Review of systems not obtained due to patient factors. Objective:   Vital Signs:    Visit Vitals  /83   Pulse 94   Temp 98 °F (36.7 °C)   Resp 18   Ht 6' 2\" (1.88 m)   Wt 66.7 kg (147 lb)   SpO2 100%   BMI 18.87 kg/m²       O2 Device: Room air       Temp (24hrs), Av.1 °F (37.3 °C), Min:98 °F (36.7 °C), Max:100.5 °F (38.1 °C)       Intake/Output:   Last shift:       0701 -  1900  In: 1247.9 [I.V.:1247.9]  Out: 2550 [Urine:2550]  Last 3 shifts:  190 -  0700  In: 3633.3 [I.V.:3633.3]  Out: 450 [Urine:450]    Intake/Output Summary (Last 24 hours) at 2019 1215  Last data filed at 2019 1200  Gross per 24 hour   Intake 4881.26 ml   Output 3000 ml   Net 1881.26 ml     Hemodynamics:   PAP:   CO:     Wedge:   CI:     CVP:    SVR:       PVR:       Ventilator Settings:  Mode Rate Tidal Volume Pressure FiO2 PEEP                    Peak airway pressure:      Minute ventilation:        Physical Exam:    General:  No acute distress   Head:  Prior crani   Eyes:  Conjunctivae/corneas clear. EOMs intact. Nose: Nares normal. Septum midline. Mucosa normal. No drainage or sinus tenderness. Throat: Lips, mucosa, and tongue normal. Teeth and gums normal.   Neck: Supple, symmetrical, trachea midline, no adenopathy, thyroid: no enlargment/tenderness/nodules, no carotid bruit and no JVD. Back:   Symmetric, no curvature. ROM normal.   Lungs:   Clear to auscultation bilaterally. Chest wall:  No tenderness or deformity. Heart:  Regular rate and rhythm, S1, S2 normal, no murmur   Abdomen:   Soft, non-tender. Bowel sounds normal       Pulses: 2+ and symmetric all extremities.    Skin: Skin color, texture, turgor normal. No rashes or lesions   Lymph nodes: Cervical nodes normal.           Data:     Recent Results (from the past 24 hour(s))   FOLATE    Collection Time: 08/19/19  1:57 PM   Result Value Ref Range    Folate 19.8 5.0 - 21.0 ng/mL   VITAMIN B12    Collection Time: 08/19/19  1:57 PM   Result Value Ref Range    Vitamin B12 1,067 (H) 193 - 986 pg/mL   TSH 3RD GENERATION    Collection Time: 08/19/19  1:57 PM   Result Value Ref Range    TSH 0.37 0.36 - 2.91 uIU/mL   METABOLIC PANEL, BASIC    Collection Time: 08/20/19  5:37 AM   Result Value Ref Range    Sodium 138 136 - 145 mmol/L    Potassium 3.1 (L) 3.5 - 5.1 mmol/L    Chloride 108 97 - 108 mmol/L    CO2 24 21 - 32 mmol/L    Anion gap 6 5 - 15 mmol/L    Glucose 114 (H) 65 - 100 mg/dL    BUN 9 6 - 20 MG/DL    Creatinine 0.53 (L) 0.70 - 1.30 MG/DL    BUN/Creatinine ratio 17 12 - 20      GFR est AA >60 >60 ml/min/1.73m2    GFR est non-AA >60 >60 ml/min/1.73m2    Calcium 8.4 (L) 8.5 - 10.1 MG/DL   CBC WITH AUTOMATED DIFF    Collection Time: 08/20/19  7:02 AM   Result Value Ref Range    WBC 4.3 4.1 - 11.1 K/uL    RBC 4.05 (L) 4.10 - 5.70 M/uL    HGB 9.2 (L) 12.1 - 17.0 g/dL    HCT 31.3 (L) 36.6 - 50.3 %    MCV 77.3 (L) 80.0 - 99.0 FL    MCH 22.7 (L) 26.0 - 34.0 PG    MCHC 29.4 (L) 30.0 - 36.5 g/dL    RDW 15.4 (H) 11.5 - 14.5 %    PLATELET  292 - 379 K/uL     UNABLE TO REPORT ACCURATE COUNT DUE TO PLATELET AGGREGATION, HOWEVER, PLATELETS APPEAR NORMAL IN NUMBER ON SMEAR. PLEASE RESUBMIT SODIUM CITRATE (BLUE) AND EDTA (LAVENDER) TUBES FOR HEMATOLOGICAL TESTING. NRBC 0.0 0  WBC    ABSOLUTE NRBC 0.00 0.00 - 0.01 K/uL    NEUTROPHILS 69 32 - 75 %    LYMPHOCYTES 13 12 - 49 %    MONOCYTES 16 (H) 5 - 13 %    EOSINOPHILS 1 0 - 7 %    BASOPHILS 1 0 - 1 %    IMMATURE GRANULOCYTES 0 0.0 - 0.5 %    ABS. NEUTROPHILS 3.0 1.8 - 8.0 K/UL    ABS. LYMPHOCYTES 0.6 (L) 0.8 - 3.5 K/UL    ABS. MONOCYTES 0.7 0.0 - 1.0 K/UL    ABS.  EOSINOPHILS 0.0 0.0 - 0.4 K/UL ABS. BASOPHILS 0.0 0.0 - 0.1 K/UL    ABS. IMM.  GRANS. 0.0 0.00 - 0.04 K/UL    DF SMEAR SCANNED      RBC COMMENTS ANISOCYTOSIS  1+        RBC COMMENTS HYPOCHROMIA  1+        RBC COMMENTS OVALOCYTES  PRESENT        RBC COMMENTS MICROCYTOSIS  1+       METABOLIC PANEL, BASIC    Collection Time: 08/20/19  7:02 AM   Result Value Ref Range    Sodium 139 136 - 145 mmol/L    Potassium 3.1 (L) 3.5 - 5.1 mmol/L    Chloride 108 97 - 108 mmol/L    CO2 25 21 - 32 mmol/L    Anion gap 6 5 - 15 mmol/L    Glucose 103 (H) 65 - 100 mg/dL    BUN 9 6 - 20 MG/DL    Creatinine 0.57 (L) 0.70 - 1.30 MG/DL    BUN/Creatinine ratio 16 12 - 20      GFR est AA >60 >60 ml/min/1.73m2    GFR est non-AA >60 >60 ml/min/1.73m2    Calcium 8.2 (L) 8.5 - 10.1 MG/DL             Telemetry:normal sinus rhythm    Imaging:  I have personally reviewed the patients radiographs and have reviewed the reports:            Danelle Barboza MD

## 2019-08-21 LAB
ANION GAP SERPL CALC-SCNC: 4 MMOL/L (ref 5–15)
BACTERIA SPEC CULT: NORMAL
BASOPHILS # BLD: 0 K/UL (ref 0–0.1)
BASOPHILS NFR BLD: 1 % (ref 0–1)
BUN SERPL-MCNC: 7 MG/DL (ref 6–20)
BUN/CREAT SERPL: 12 (ref 12–20)
CALCIUM SERPL-MCNC: 8.2 MG/DL (ref 8.5–10.1)
CC UR VC: NORMAL
CHLORIDE SERPL-SCNC: 112 MMOL/L (ref 97–108)
CO2 SERPL-SCNC: 25 MMOL/L (ref 21–32)
CREAT SERPL-MCNC: 0.57 MG/DL (ref 0.7–1.3)
DIFFERENTIAL METHOD BLD: ABNORMAL
EOSINOPHIL # BLD: 0.1 K/UL (ref 0–0.4)
EOSINOPHIL NFR BLD: 3 % (ref 0–7)
ERYTHROCYTE [DISTWIDTH] IN BLOOD BY AUTOMATED COUNT: 15.4 % (ref 11.5–14.5)
GLUCOSE SERPL-MCNC: 88 MG/DL (ref 65–100)
HCT VFR BLD AUTO: 32.5 % (ref 36.6–50.3)
HGB BLD-MCNC: 9.4 G/DL (ref 12.1–17)
IMM GRANULOCYTES # BLD AUTO: 0 K/UL (ref 0–0.04)
IMM GRANULOCYTES NFR BLD AUTO: 0 % (ref 0–0.5)
LACTATE SERPL-SCNC: 1.1 MMOL/L (ref 0.4–2)
LYMPHOCYTES # BLD: 1.4 K/UL (ref 0.8–3.5)
LYMPHOCYTES NFR BLD: 38 % (ref 12–49)
MCH RBC QN AUTO: 22.6 PG (ref 26–34)
MCHC RBC AUTO-ENTMCNC: 28.9 G/DL (ref 30–36.5)
MCV RBC AUTO: 78.1 FL (ref 80–99)
MONOCYTES # BLD: 0.6 K/UL (ref 0–1)
MONOCYTES NFR BLD: 15 % (ref 5–13)
NEUTS SEG # BLD: 1.6 K/UL (ref 1.8–8)
NEUTS SEG NFR BLD: 43 % (ref 32–75)
NRBC # BLD: 0 K/UL (ref 0–0.01)
NRBC BLD-RTO: 0 PER 100 WBC
PLATELET # BLD AUTO: 163 K/UL (ref 150–400)
PMV BLD AUTO: 9.4 FL (ref 8.9–12.9)
POTASSIUM SERPL-SCNC: 3.9 MMOL/L (ref 3.5–5.1)
RBC # BLD AUTO: 4.16 M/UL (ref 4.1–5.7)
RBC MORPH BLD: ABNORMAL
SERVICE CMNT-IMP: NORMAL
SODIUM SERPL-SCNC: 141 MMOL/L (ref 136–145)
WBC # BLD AUTO: 3.7 K/UL (ref 4.1–11.1)

## 2019-08-21 PROCEDURE — C9254 INJECTION, LACOSAMIDE: HCPCS | Performed by: NURSE PRACTITIONER

## 2019-08-21 PROCEDURE — 74011250636 HC RX REV CODE- 250/636: Performed by: INTERNAL MEDICINE

## 2019-08-21 PROCEDURE — 74011000258 HC RX REV CODE- 258: Performed by: NURSE PRACTITIONER

## 2019-08-21 PROCEDURE — 92526 ORAL FUNCTION THERAPY: CPT | Performed by: SPEECH-LANGUAGE PATHOLOGIST

## 2019-08-21 PROCEDURE — 74011000258 HC RX REV CODE- 258: Performed by: INTERNAL MEDICINE

## 2019-08-21 PROCEDURE — 80048 BASIC METABOLIC PNL TOTAL CA: CPT

## 2019-08-21 PROCEDURE — 65660000000 HC RM CCU STEPDOWN

## 2019-08-21 PROCEDURE — 85025 COMPLETE CBC W/AUTO DIFF WBC: CPT

## 2019-08-21 PROCEDURE — 83605 ASSAY OF LACTIC ACID: CPT

## 2019-08-21 PROCEDURE — 74011250636 HC RX REV CODE- 250/636: Performed by: NURSE PRACTITIONER

## 2019-08-21 PROCEDURE — 74011250637 HC RX REV CODE- 250/637: Performed by: INTERNAL MEDICINE

## 2019-08-21 PROCEDURE — 36415 COLL VENOUS BLD VENIPUNCTURE: CPT

## 2019-08-21 RX ADMIN — Medication 10 ML: at 14:17

## 2019-08-21 RX ADMIN — PANTOPRAZOLE SODIUM 40 MG: 40 TABLET, DELAYED RELEASE ORAL at 08:24

## 2019-08-21 RX ADMIN — BISACODYL 10 MG: 10 SUPPOSITORY RECTAL at 22:58

## 2019-08-21 RX ADMIN — CEFEPIME HYDROCHLORIDE 2 G: 2 INJECTION, POWDER, FOR SOLUTION INTRAVENOUS at 14:13

## 2019-08-21 RX ADMIN — SODIUM CHLORIDE 150 MG: 900 INJECTION, SOLUTION INTRAVENOUS at 09:44

## 2019-08-21 RX ADMIN — SODIUM CHLORIDE 150 MG: 900 INJECTION, SOLUTION INTRAVENOUS at 21:40

## 2019-08-21 RX ADMIN — CEFEPIME HYDROCHLORIDE 2 G: 2 INJECTION, POWDER, FOR SOLUTION INTRAVENOUS at 06:03

## 2019-08-21 RX ADMIN — DANTROLENE SODIUM 100 MG: 25 CAPSULE ORAL at 12:30

## 2019-08-21 RX ADMIN — PANTOPRAZOLE SODIUM 40 MG: 40 TABLET, DELAYED RELEASE ORAL at 21:42

## 2019-08-21 RX ADMIN — DEXTROSE MONOHYDRATE AND SODIUM CHLORIDE 100 ML/HR: 5; .9 INJECTION, SOLUTION INTRAVENOUS at 14:14

## 2019-08-21 RX ADMIN — SENNOSIDES 17.2 MG: 8.6 TABLET, FILM COATED ORAL at 21:42

## 2019-08-21 RX ADMIN — ENOXAPARIN SODIUM 40 MG: 40 INJECTION SUBCUTANEOUS at 21:40

## 2019-08-21 RX ADMIN — DANTROLENE SODIUM 100 MG: 25 CAPSULE ORAL at 21:42

## 2019-08-21 RX ADMIN — CEFEPIME HYDROCHLORIDE 2 G: 2 INJECTION, POWDER, FOR SOLUTION INTRAVENOUS at 22:57

## 2019-08-21 RX ADMIN — Medication 10 ML: at 06:03

## 2019-08-21 RX ADMIN — DOCUSATE SODIUM 100 MG: 100 CAPSULE, LIQUID FILLED ORAL at 21:42

## 2019-08-21 RX ADMIN — DANTROLENE SODIUM 100 MG: 25 CAPSULE ORAL at 17:29

## 2019-08-21 RX ADMIN — BROMOCRIPTINE MESYLATE 7.5 MG: 2.5 TABLET ORAL at 08:24

## 2019-08-21 RX ADMIN — SODIUM CHLORIDE 1000 ML: 900 INJECTION, SOLUTION INTRAVENOUS at 00:12

## 2019-08-21 RX ADMIN — BROMOCRIPTINE MESYLATE 7.5 MG: 2.5 TABLET ORAL at 21:40

## 2019-08-21 RX ADMIN — DANTROLENE SODIUM 100 MG: 25 CAPSULE ORAL at 08:24

## 2019-08-21 NOTE — PROCEDURES
[facility]  EEG    Name:  Chato Boateng  MR#:  [id number]  :  1985  ACCOUNT #:  [de-identified]  DATE OF SERVICE:  2019      CONTINUOUS EEG WITH SIMULTANEOUS VIDEO MONITORING    REQUESTING PROVIDER:  Ji Gonzalez NP    CLINICAL HISTORY:  Continuous EEG with simultaneous video monitoring is requested and performed in this 68-year-old man with seizure to evaluate for epileptiform abnormality. MEDICATIONS:  Alondra Manley to include Vimpat. EEG REPORT:  During wakefulness, there are mixed theta and alpha range frequencies seen diffusely. There is an apparent breach rhythm over the right hemisphere. No clinical events are documented for review. Review of computerized spike and seizure detection software reveals no spike and no seizure. Sleep architecture is seen. INTERPRETATION:  Continuous EEG with simultaneous video monitoring fails to demonstrate any epileptiform abnormalities and fails to demonstrate evidence of ongoing or subclinical ictus.       Santos Lafleur MD      [id number]/  D:  2019 20:03  T:  2019 10:05  JOB #:  [id number]

## 2019-08-21 NOTE — PROGRESS NOTES
1106: Gave update to pt's mother via phone. She stated she will be here this afternoon to see the pt.  1911: Bedside and Verbal shift change report given to John Zaman 86 (oncoming nurse) by Marga Littlejohn RN (offgoing nurse).  Report included the following information SBAR, Kardex, Intake/Output, Recent Results and Cardiac Rhythm ST.

## 2019-08-21 NOTE — PROGRESS NOTES
2131  Pt febrile at 100.9 and Hr 105-110s, otherwise pt seems comfortable. PRN tylenol suppository given. Dr. Laisha Knox made aware. Will continue to monitor. 2300  Upon reassessment for fever, temp down to 99. 5. However, BP now 80/55, Hr 83, RR 16, and O2 98%. Dr. Laisha Knox ordered a 500 cc bolus. Will continue to monitor. 2345  Bolus given. Pt still lethargic but responds to still  Vital rechecked: BP 81/53, HR 78, temp 98, RR 16, O2 95% on room air. Dr. Laisha Knox made aware and ordered another bolus to be given. Will continue to monitor. 0133  BP recheck after 1 L bolus was 84/55. Dr. Laisha Knox to tube written transfer orders due to downtime. 0145  He is more responsive and sipped some nectar-thick water. Pt suctioned, turned, bathed, and condom cath changed. Stat lactic and AM labs drawn. Will call for report and transfer. Vitals are: BP 88/51, HR 72. TRANSFER - OUT REPORT:    Verbal report given to Mil Sebastian RN (name) on Kathi Cramer  being transferred to ICU (unit) for urgent transfer       Report consisted of patients Situation, Background, Assessment and   Recommendations(SBAR). Information from the following report(s) SBAR, Kardex, Intake/Output, MAR, Recent Results and Med Rec Status was reviewed with the receiving nurse.     Lines:   Peripheral IV 08/19/19 Right Forearm (Active)   Site Assessment Clean, dry, & intact 8/21/2019  4:00 AM   Phlebitis Assessment 0 8/21/2019  4:00 AM   Infiltration Assessment 0 8/21/2019  4:00 AM   Dressing Status Clean, dry, & intact 8/21/2019  4:00 AM   Dressing Type Transparent;Tape 8/21/2019  4:00 AM   Hub Color/Line Status Blue;Capped 8/21/2019  4:00 AM   Action Taken Open ports on tubing capped 8/21/2019  4:00 AM   Alcohol Cap Used Yes 8/21/2019  4:00 AM       Peripheral IV 08/20/19 Left Antecubital (Active)   Site Assessment Clean, dry, & intact 8/21/2019  4:00 AM   Phlebitis Assessment 0 8/21/2019  4:00 AM   Infiltration Assessment 0 8/21/2019  4:00 AM   Dressing Status Clean, dry, & intact 8/21/2019  4:00 AM   Dressing Type Transparent;Tape 8/21/2019  4:00 AM   Hub Color/Line Status Pink; Infusing 8/21/2019  4:00 AM   Action Taken Open ports on tubing capped 8/20/2019  7:27 PM   Alcohol Cap Used Yes 8/21/2019  4:00 AM       Peripheral IV 08/20/19 Right Wrist (Active)   Site Assessment Clean, dry, & intact 8/21/2019  4:00 AM   Phlebitis Assessment 0 8/21/2019  4:00 AM   Infiltration Assessment 0 8/21/2019  4:00 AM   Dressing Status Clean, dry, & intact 8/21/2019  4:00 AM   Dressing Type Transparent;Tape 8/21/2019  4:00 AM   Hub Color/Line Status Blue;Capped 8/21/2019  4:00 AM   Action Taken Open ports on tubing capped 8/20/2019  7:27 PM   Alcohol Cap Used Yes 8/21/2019  4:00 AM        Opportunity for questions and clarification was provided.       Patient transported with:   Monitor  Patient-specific medications from Pharmacy  Registered Nurse

## 2019-08-21 NOTE — PROGRESS NOTES
Problem: Dysphagia (Adult)  Goal: *Acute Goals and Plan of Care (Insert Text)  Description  Swallowing goals initiated 8-20-19:   1)  Tolerate dyspahgia 1, nectars without s/s aspiration by 8-22-19   Outcome: Progressing Towards Goal       SPEECH LANGUAGE PATHOLOGY DYSPHAGIA TREATMENT  Patient: Audelia Larios (6 y.o. male)  Date: 8/21/2019  Diagnosis: Sepsis ([address] [A41.9]  Seizure ([address] [R56.9] Seizure ([address]       Precautions: swallow      ASSESSMENT:  Patient with hx of dysphagia, now on puree diet and nectar liquids, with strong s/s of aspiration on thin liquids today. Patient takes large gulping sips of thin liquid despite verbal cues: likely has difficulty coordinating smaller sips. Not ready to upgrade. PLAN:  Recommendations and Planned Interventions:  Continue puree diet, nectar liquids  Slow, careful A with feeding    Patient continues to benefit from skilled intervention to address the above impairments. Continue treatment per established plan of care. Discharge Recommendations: To Be Determined     SUBJECTIVE:   Patient indicated that he wanted to eat. OBJECTIVE:   Cognitive and Communication Status:  Neurologic State: Alert  Orientation Level: Unable to verbalize  Cognition: Unable to assess (comment)  Perception: (difficult to assesss)  Perseveration: No perseveration noted  Safety/Judgement: Not assessed  Dysphagia Treatment:  Oral Assessment:  Oral Assessment  Labial: Decreased seal(significat open mouth posture)  Dentition: Natural  Oral Hygiene: clean tongue, continuous drooling  Lingual: No impairment  Mandible: No impairment  P.O. Trials:  Patient Position: upright in bed, head falls to left despite corrections  Vocal quality prior to P.O.: Aphonic  Consistency Presented:  Thin liquid;Puree;Nectar thick liquid  How Presented: SLP-fed/presented;Straw     Bolus Acceptance: Impaired  Bolus Formation/Control: Impaired  Type of Impairment: Delayed;Drooling;Lip closure;Spillage  Propulsion: Discoordination  Oral Residue: 10-50% of bolus; Left     Laryngeal Elevation: Functional  Aspiration Signs/Symptoms: (strong cough with straw sips of thin )     Effective Modifications: None                          Pain:  Pain Scale 1: Adult Nonverbal Pain Scale  Pain Intensity 1: 0     After treatment:   ?              Patient left in no apparent distress sitting up in chair  ? Patient left in no apparent distress in bed  ? Call bell left within reach  ? Nursing notified  ? Caregiver present  ? Bed alarm activated    COMMUNICATION/EDUCATION:     The patients plan of care including recommendations, planned interventions, and recommended diet changes were discussed with: Registered Nurse. ? Posted safety precautions in patient's room.     Manuel Mtz, SLP  Time Calculation: 20 mins
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
## 2019-08-21 NOTE — PROGRESS NOTES
Bedside and Verbal shift change report given to Ru (oncoming nurse) by aKelyn Krishnamurthy (offgoing nurse). Report included the following information SBAR, Kardex, ED Summary, Procedure Summary, Intake/Output, MAR, Accordion, Recent Results, Med Rec Status and Cardiac Rhythm SR- ST. Primary Nurse Kenan Betancur and Francesca RN performed a dual skin assessment on this patient. See Bhupinder score. 2000- Full assessment complete. Pt awake/alert- non-verbal at baseline. Pt able to track Rn in room. Pt is a total care pt who has contractures/rigid at baseline from past TBI. Condom cath in place and patent. Pt has been turned and repositioned. Mouth care and incontinence care complete. 2200- Meds given. Pt turned and repositioned. Mouth care complete. Call bell with in reach. 0000- Reassessment completed and unchanged. Pt has been turned and repositioned. Mouth care complete. Call bell with in reach. 0200- Pt turned and repositioned. Mouth care complete. Call bell with in reach. 0400- Reassessment completed and unchanged. Pt has been turned and repositioned. Mouth care complete. Call bell with in reach. 9305- Am labs drawn and sent. 0600- Pt turned and repositioned. Mouth care complete. Call bell with in reach.

## 2019-08-22 LAB
ANION GAP SERPL CALC-SCNC: 7 MMOL/L (ref 5–15)
BASOPHILS # BLD: 0 K/UL (ref 0–0.1)
BASOPHILS NFR BLD: 1 % (ref 0–1)
BUN SERPL-MCNC: 6 MG/DL (ref 6–20)
BUN/CREAT SERPL: 11 (ref 12–20)
CALCIUM SERPL-MCNC: 8.9 MG/DL (ref 8.5–10.1)
CHLORIDE SERPL-SCNC: 106 MMOL/L (ref 97–108)
CO2 SERPL-SCNC: 25 MMOL/L (ref 21–32)
CREAT SERPL-MCNC: 0.54 MG/DL (ref 0.7–1.3)
DIFFERENTIAL METHOD BLD: ABNORMAL
EOSINOPHIL # BLD: 0.1 K/UL (ref 0–0.4)
EOSINOPHIL NFR BLD: 2 % (ref 0–7)
ERYTHROCYTE [DISTWIDTH] IN BLOOD BY AUTOMATED COUNT: 15.1 % (ref 11.5–14.5)
GLUCOSE SERPL-MCNC: 115 MG/DL (ref 65–100)
HCT VFR BLD AUTO: 31.4 % (ref 36.6–50.3)
HGB BLD-MCNC: 9.6 G/DL (ref 12.1–17)
IMM GRANULOCYTES # BLD AUTO: 0 K/UL (ref 0–0.04)
IMM GRANULOCYTES NFR BLD AUTO: 0 % (ref 0–0.5)
LYMPHOCYTES # BLD: 1.1 K/UL (ref 0.8–3.5)
LYMPHOCYTES NFR BLD: 21 % (ref 12–49)
MCH RBC QN AUTO: 22.9 PG (ref 26–34)
MCHC RBC AUTO-ENTMCNC: 30.6 G/DL (ref 30–36.5)
MCV RBC AUTO: 74.8 FL (ref 80–99)
MONOCYTES # BLD: 0.6 K/UL (ref 0–1)
MONOCYTES NFR BLD: 12 % (ref 5–13)
NEUTS SEG # BLD: 3.3 K/UL (ref 1.8–8)
NEUTS SEG NFR BLD: 64 % (ref 32–75)
NRBC # BLD: 0 K/UL (ref 0–0.01)
NRBC BLD-RTO: 0 PER 100 WBC
PLATELET # BLD AUTO: 143 K/UL (ref 150–400)
PMV BLD AUTO: 10.4 FL (ref 8.9–12.9)
POTASSIUM SERPL-SCNC: 3.5 MMOL/L (ref 3.5–5.1)
RBC # BLD AUTO: 4.2 M/UL (ref 4.1–5.7)
SODIUM SERPL-SCNC: 138 MMOL/L (ref 136–145)
WBC # BLD AUTO: 5.1 K/UL (ref 4.1–11.1)

## 2019-08-22 PROCEDURE — 74011000258 HC RX REV CODE- 258: Performed by: NURSE PRACTITIONER

## 2019-08-22 PROCEDURE — 36415 COLL VENOUS BLD VENIPUNCTURE: CPT

## 2019-08-22 PROCEDURE — 65660000000 HC RM CCU STEPDOWN

## 2019-08-22 PROCEDURE — C9254 INJECTION, LACOSAMIDE: HCPCS | Performed by: NURSE PRACTITIONER

## 2019-08-22 PROCEDURE — 85025 COMPLETE CBC W/AUTO DIFF WBC: CPT

## 2019-08-22 PROCEDURE — 74011000258 HC RX REV CODE- 258: Performed by: INTERNAL MEDICINE

## 2019-08-22 PROCEDURE — 80048 BASIC METABOLIC PNL TOTAL CA: CPT

## 2019-08-22 PROCEDURE — 74011250636 HC RX REV CODE- 250/636: Performed by: INTERNAL MEDICINE

## 2019-08-22 PROCEDURE — 74011250636 HC RX REV CODE- 250/636: Performed by: NURSE PRACTITIONER

## 2019-08-22 PROCEDURE — 74011250637 HC RX REV CODE- 250/637: Performed by: INTERNAL MEDICINE

## 2019-08-22 RX ADMIN — CEFEPIME HYDROCHLORIDE 2 G: 2 INJECTION, POWDER, FOR SOLUTION INTRAVENOUS at 06:55

## 2019-08-22 RX ADMIN — SENNOSIDES 17.2 MG: 8.6 TABLET, FILM COATED ORAL at 21:13

## 2019-08-22 RX ADMIN — Medication 10 ML: at 21:14

## 2019-08-22 RX ADMIN — ENOXAPARIN SODIUM 40 MG: 40 INJECTION SUBCUTANEOUS at 21:12

## 2019-08-22 RX ADMIN — SODIUM CHLORIDE 150 MG: 900 INJECTION, SOLUTION INTRAVENOUS at 21:13

## 2019-08-22 RX ADMIN — Medication 10 ML: at 13:26

## 2019-08-22 RX ADMIN — DANTROLENE SODIUM 100 MG: 25 CAPSULE ORAL at 21:13

## 2019-08-22 RX ADMIN — DEXTROSE MONOHYDRATE AND SODIUM CHLORIDE 100 ML/HR: 5; .9 INJECTION, SOLUTION INTRAVENOUS at 00:40

## 2019-08-22 RX ADMIN — DEXTROSE MONOHYDRATE AND SODIUM CHLORIDE 100 ML/HR: 5; .9 INJECTION, SOLUTION INTRAVENOUS at 09:00

## 2019-08-22 RX ADMIN — BROMOCRIPTINE MESYLATE 7.5 MG: 2.5 TABLET ORAL at 09:00

## 2019-08-22 RX ADMIN — DANTROLENE SODIUM 100 MG: 25 CAPSULE ORAL at 18:55

## 2019-08-22 RX ADMIN — DANTROLENE SODIUM 100 MG: 25 CAPSULE ORAL at 13:22

## 2019-08-22 RX ADMIN — PANTOPRAZOLE SODIUM 40 MG: 40 TABLET, DELAYED RELEASE ORAL at 21:13

## 2019-08-22 RX ADMIN — PANTOPRAZOLE SODIUM 40 MG: 40 TABLET, DELAYED RELEASE ORAL at 06:55

## 2019-08-22 RX ADMIN — DANTROLENE SODIUM 100 MG: 25 CAPSULE ORAL at 09:00

## 2019-08-22 RX ADMIN — SODIUM CHLORIDE 150 MG: 900 INJECTION, SOLUTION INTRAVENOUS at 09:04

## 2019-08-22 RX ADMIN — DOCUSATE SODIUM 100 MG: 100 CAPSULE, LIQUID FILLED ORAL at 21:13

## 2019-08-22 RX ADMIN — BROMOCRIPTINE MESYLATE 7.5 MG: 2.5 TABLET ORAL at 21:13

## 2019-08-22 NOTE — ROUTINE PROCESS
Bedside and Verbal shift change report given to [employer] (oncoming nurse) by Francesca CHARLES (offgoing nurse). Report included the following information SBAR, Kardex, Intake/Output, Recent Results and Cardiac Rhythm NSR.

## 2019-08-22 NOTE — PROGRESS NOTES
Per baseline,pt is approaching baseline.     Pt is on the sepsis bundle for [facility]

## 2019-08-22 NOTE — PROGRESS NOTES
1916  Report received pt lying in bed no distress noted     1940  Assessment complete     2113  Pt given med with applesauce     2313  Pt resting quietly no distress noted     0115  Rounds made no distress noted call bell within reach     0347  Pt turned no distress noted     0531  Lab drawn     0728  Bedside and Verbal shift change report given to Melisa (oncoming nurse) by Vania Wilhelm (offgoing nurse).  Report included the following information SBAR, Kardex, ED Summary, Intake/Output, MAR, Recent Results and Cardiac Rhythm SR.
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
## 2019-08-22 NOTE — PROGRESS NOTES
Nutrition Assessment:    RECOMMENDATIONS/INTERVENTION(S):   1. Continue with SLP rec's for diet consistency. 2. Add Magic Cup and yogurt to all meals. 3. Monitor PO intakes of meals/ONS, tolerance to PO, weight. ASSESSMENT:   8/22: 31 yo male admitted for sepsis, seizure. RD assessment for LOS. PMhx: seizures, TBI, non-verbal.  Weight WNL per BMI per age. No recent weight loss per wt hx in EMR. Favorable weight gain of 5kg noted over last 2 years. Pt's mother reports pt's weight stays between 145-150lbs, current weight charted as 153lbs. Pt usually has good appetite at home. SLP following - on recommended diet of Pureed with Nectar thick liquids. Pt tolerating diet with good intakes. Pt requires full assistance with meals. Mother requesting yogurt and Magic Cup be added with all meals. D5 NaCl running at 100ml/hr (408kcals). Labs reviewed. Meds: Colace, Dulcolax. Skin intact. Diet Order: Puree  % Eaten:    Patient Vitals for the past 72 hrs:   % Diet Eaten   08/21/19 1752 95 %   08/21/19 0821 50 %   08/20/19 1733 100 %       Pertinent Medications: [x] Reviewed    Labs: [x] Reviewed    Anthropometrics: Height: 6' 2\" (188 cm) Weight: 69.7 kg (153 lb 10.6 oz)    IBW (%IBW):   ( ) UBW (%UBW):   (  %)      BMI: Body mass index is 19.73 kg/m². This BMI is indicative of:   [] Underweight    [x] Normal    [] Overweight    []  Obesity    []  Extreme Obesity (BMI>40)  Estimated Nutrition Needs (Based on): 2232 Kcals/day(REE 1717 x AF 1.3) , 70 g(70-84gm (1-1.2gm/kg/d)) Protein  Carbohydrate: At Least 130 g/day  Fluids: 2232 mL/day (1 ml/kcal)    Last BM: 8/20   [x]Active     []Hyperactive  []Hypoactive       [] Absent   BS  Skin:    [x] Intact   [] Incision  [] Breakdown   [] DTI   [] Tears/Excoriation/Abrasion  []Edema [] Other:    Wt Readings from Last 30 Encounters:   08/21/19 69.7 kg (153 lb 10.6 oz)   07/30/19 68 kg (149 lb 14.6 oz)   07/23/19 68 kg (150 lb)   08/07/18 68 kg (150 lb) 05/01/18 65.8 kg (145 lb)   05/01/18 64.9 kg (143 lb)   02/14/17 64.9 kg (143 lb)      NUTRITION DIAGNOSES:   Problem:  Swallowing difficulty     Etiology: related to motor causes     Signs/Symptoms: as evidenced by abnormal swallow eval; need for pureed diet with nectar thick liquids per SLP      NUTRITION INTERVENTIONS:  Meals/Snacks: General/healthful diet   Supplements: Commercial supplement              GOAL:   Consume > 75% all meals + ONS with no s/s of aspiration within next 4-6 days    Cultural, Gnosticist, or Ethnic Dietary Needs: None     EDUCATION & DISCHARGE NEEDS:    [x] None Identified    [] Identified and Education Provided/Documented   [] Identified and Pt declined/was not appropriate      [x] Interdisciplinary Care Plan Reviewed/Documented    [x] Discharge Needs:     Follow Pureed diet with nectar thick liquids per SLP rec's + ONS 2x/day at home   [] No Nutrition Related Discharge Needs    NUTRITION RISK:   Pt Is At Nutrition Risk  [x]     No Nutrition Risk Identified  []       PT SEEN FOR:    []  MD Consult: []Calorie Count      []Diabetic Diet Education        []Diet Education     []Electrolyte Management     []General Nutrition Management and Supplements     []Management of Tube Feeding     []TPN Recommendations    []  RN Referral:  []MST score >=2     []Enteral/Parenteral Nutrition PTA     []Pregnant: Gestational DM or Multigestation                 [] Pressure Ulcer    []  Low BMI      [x]  Length of Stay       [] Dysphagia Diet         [] Ventilator  []  Follow-up     Previous Recommendations:   [] Implemented          [] Not Implemented          [x] Not Applicable    Previous Goal:   [] Met              [] Progressing Towards Goal              [] Not Progressing Towards Goal   [x] Not Applicable            Arnoldo Obregon, [address]  Pager [phone]  Phone [phone]

## 2019-08-23 VITALS
BODY MASS INDEX: 18.02 KG/M2 | WEIGHT: 140.43 LBS | TEMPERATURE: 98 F | HEART RATE: 100 BPM | RESPIRATION RATE: 12 BRPM | SYSTOLIC BLOOD PRESSURE: 121 MMHG | HEIGHT: 74 IN | OXYGEN SATURATION: 90 % | DIASTOLIC BLOOD PRESSURE: 75 MMHG

## 2019-08-23 PROCEDURE — C9254 INJECTION, LACOSAMIDE: HCPCS | Performed by: NURSE PRACTITIONER

## 2019-08-23 PROCEDURE — 74011000258 HC RX REV CODE- 258: Performed by: NURSE PRACTITIONER

## 2019-08-23 PROCEDURE — 97165 OT EVAL LOW COMPLEX 30 MIN: CPT

## 2019-08-23 PROCEDURE — 97162 PT EVAL MOD COMPLEX 30 MIN: CPT

## 2019-08-23 PROCEDURE — 74011250637 HC RX REV CODE- 250/637: Performed by: INTERNAL MEDICINE

## 2019-08-23 PROCEDURE — 77030018836 HC SOL IRR NACL ICUM -A

## 2019-08-23 PROCEDURE — 97530 THERAPEUTIC ACTIVITIES: CPT

## 2019-08-23 PROCEDURE — 74011000258 HC RX REV CODE- 258: Performed by: INTERNAL MEDICINE

## 2019-08-23 PROCEDURE — 97535 SELF CARE MNGMENT TRAINING: CPT

## 2019-08-23 PROCEDURE — 74011250636 HC RX REV CODE- 250/636: Performed by: NURSE PRACTITIONER

## 2019-08-23 RX ORDER — LACOSAMIDE 150 MG/1
150 TABLET ORAL 2 TIMES DAILY
Qty: 60 TAB | Refills: 0 | Status: ON HOLD | OUTPATIENT
Start: 2019-08-23 | End: 2019-12-26 | Stop reason: SDUPTHER

## 2019-08-23 RX ORDER — LACOSAMIDE 50 MG/1
50 TABLET ORAL 2 TIMES DAILY
Qty: 30 TAB | Refills: 0 | Status: SHIPPED | OUTPATIENT
Start: 2019-08-23 | End: 2019-12-23

## 2019-08-23 RX ADMIN — Medication 10 ML: at 08:00

## 2019-08-23 RX ADMIN — PANTOPRAZOLE SODIUM 40 MG: 40 TABLET, DELAYED RELEASE ORAL at 09:47

## 2019-08-23 RX ADMIN — DANTROLENE SODIUM 100 MG: 25 CAPSULE ORAL at 09:47

## 2019-08-23 RX ADMIN — Medication 10 ML: at 10:35

## 2019-08-23 RX ADMIN — DEXTROSE MONOHYDRATE AND SODIUM CHLORIDE 100 ML/HR: 5; .9 INJECTION, SOLUTION INTRAVENOUS at 09:48

## 2019-08-23 RX ADMIN — DANTROLENE SODIUM 100 MG: 25 CAPSULE ORAL at 14:01

## 2019-08-23 RX ADMIN — BROMOCRIPTINE MESYLATE 7.5 MG: 2.5 TABLET ORAL at 09:47

## 2019-08-23 RX ADMIN — SODIUM CHLORIDE 150 MG: 900 INJECTION, SOLUTION INTRAVENOUS at 09:00

## 2019-08-23 NOTE — PROGRESS NOTES
Problem: Mobility Impaired (Adult and Pediatric)  Goal: *Acute Goals and Plan of Care (Insert Text)  Description  FUNCTIONAL STATUS PRIOR TO ADMISSION: The patient required assistance for all bed mobility and transfers; is non-ambulatory. HOME SUPPORT PRIOR TO ADMISSION: The patient lived with family and received assistance from several additional caregivers. Physical Therapy Goals  Initiated 8/23/2019  1. Patient will move from supine to sit and sit to supine  in bed with minimal assistance/contact guard assist within 7 day(s). 2.  Patient will transfer from bed to chair and chair to bed with minimal assistance using the least restrictive device within 7 day(s). 3.  Patient will perform sit to stand with minimal assistance/contact guard assist within 7 day(s). 4.  Patient will sit unsupported x 5 mins with supervision within 7 days. Outcome: Progressing Towards Goal   PHYSICAL THERAPY EVALUATION  Patient: Efren Templeton (62 y.o. male)  Date: 8/23/2019  Primary Diagnosis: Sepsis ([facility] [address] [A41.9]  Seizure ([facility] [address] [R56.9]        Precautions: Falls         ASSESSMENT  Based on the objective data described below, the patient presents with decreased strength, impaired balance, and limited functional mobility on day 4 of admission s/p seizure. At baseline pt with fluctuating abilities and functional mobility, though per mother pt overall limited compared with reported baseline. Pt appears more responsive to commands issued by mother vs. Rehab staff. He offers good efforts and remains out of bed following encounter. Current Level of Function Impacting Discharge (mobility/balance): moderate assistance x 2 for transfers    Functional Outcome Measure: The patient scored 10 on the Barthel Index outcome measure which is indicative of 90% functional impairment. Other factors to consider for discharge: several caregivers at baseline; mother states necessary level of care is available to patient at home.  Pt well-equipped with DME, home modifications, and physical support. Patient will benefit from skilled therapy intervention to address the above noted impairments. PLAN :  Recommendations and Planned Interventions: bed mobility training, transfer training, therapeutic exercises, patient and family training/education and therapeutic activities      Frequency/Duration: Patient will be followed by physical therapy:  5 times a week to address goals. Recommendation for discharge: (in order for the patient to meet his/her long term goals)  Physical therapy at least 2 days/week in the home AND ensure assist and/or supervision for safety with all transfers and bed mobility     This discharge recommendation:  A follow-up discussion with the attending provider and/or case management is planned    Equipment recommendations for successful discharge (if) home: none         SUBJECTIVE:   Patient non-verbal, agrees to participation. Mother offers extensive information regarding home set-up, therapy goals for patient, and available assistance. OBJECTIVE DATA SUMMARY:   HISTORY:    Past Medical History:   Diagnosis Date    Anemia 7/28/2019    Neurological disorder     traumatic brain injury    Seizures ([facility] [address]     TBI (traumatic brain injury) ([facility] [address]      Past Surgical History:   Procedure Laterality Date    HX CRANIOTOMY         Personal factors and/or comorbidities impacting plan of care: as above. Home Situation  Home Environment: Private residence  One/Two Story Residence: Other (Comment)  Living Alone: No  Support Systems: Family member(s)  Patient Expects to be Discharged to[de-identified] Private residence  Current DME Used/Available at Home: Other (comment)    EXAMINATION/PRESENTATION/DECISION MAKING:   Critical Behavior:  Neurologic State: Alert  Orientation Level: Unable to verbalize  Cognition: Follows commands  Safety/Judgement: Not assessed  Hearing:   Auditory  Auditory Impairment: None  Skin:  LE exposes skin intact; healing would to L scapula  Edema: none noted LEs  Range Of Motion:  AROM: Grossly decreased, non-functional      Passively positions head and neck with L flexion and rotation. Able to improve with mother's cues but does not reach neutral positioning. PROM: Grossly decreased, non-functional           Strength:    Strength: Grossly decreased, non-functional                    Tone & Sensation:   Tone: Abnormal                              Coordination:  Coordination: Grossly decreased, non-functional  Vision:      Functional Mobility:  Bed Mobility:     Supine to Sit: Moderate assistance;Assist x2        Transfers:  Sit to Stand: Moderate assistance;Assist x2  Stand to Sit: Moderate assistance;Assist x2        Bed to Chair: Moderate assistance;Assist x2      Pivot transfer leading to L        Balance:   Sitting: Impaired;High guard  Standing: Impaired  Ambulation/Gait Training:         Pt non-ambulatory                                                      Functional Measure:  Barthel Index:    Bathin  Bladder: 5  Bowels: 0  Groomin  Dressin  Feedin  Mobility: 0  Stairs: 0  Toilet Use: 0  Transfer (Bed to Chair and Back): 0  Total: 10/100       The Barthel ADL Index: Guidelines  1. The index should be used as a record of what a patient does, not as a record of what a patient could do. 2. The main aim is to establish degree of independence from any help, physical or verbal, however minor and for whatever reason. 3. The need for supervision renders the patient not independent. 4. A patient's performance should be established using the best available evidence. Asking the patient, friends/relatives and nurses are the usual sources, but direct observation and common sense are also important. However direct testing is not needed. 5. Usually the patient's performance over the preceding 24-48 hours is important, but occasionally longer periods will be relevant.   6. Middle categories imply that the patient supplies over 50 per cent of the effort. 7. Use of aids to be independent is allowed. Leida Mage., Barthel, D.W. ([id number]). Functional evaluation: the Barthel Index. [address] (14)2. CEDRICK Kennedy, Trisha Gonzalez., Alison Button., [place], [address] (1999). Measuring the change indisability after inpatient rehabilitation; comparison of the responsiveness of the Barthel Index and Functional Hart Measure. Journal of Neurology, Neurosurgery, and Psychiatry, 66(4), 351-379. Seth Hodge, GUERDA, ALIS Kumar, & Aide Roth M.A. (2004.) Assessment of post-stroke quality of life in cost-effectiveness studies: The usefulness of the Barthel Index and the EuroQoL-5D. Quality of Life Research, 15, 498-43            Physical Therapy Evaluation Charge Determination   History Examination Presentation Decision-Making   HIGH Complexity :3+ comorbidities / personal factors will impact the outcome/ POC  HIGH Complexity : 4+ Standardized tests and measures addressing body structure, function, activity limitation and / or participation in recreation  MEDIUM Complexity : Evolving with changing characteristics  Other outcome measures barthel  HIGH       Based on the above components, the patient evaluation is determined to be of the following complexity level: MEDIUM    Pain Rating:  Adult nonverbal 0    Activity Tolerance:   Good  Please refer to the flowsheet for vital signs taken during this treatment. After treatment patient left in no apparent distress:   Sitting in chair, Call bell within reach and Caregiver / family present    COMMUNICATION/EDUCATION:   The patients plan of care was discussed with: Occupational Therapist and Registered Nurse. Fall prevention education was provided and the patient/caregiver indicated understanding., Patient/family have participated as able in goal setting and plan of care. and Patient/family agree to work toward stated goals and plan of care.     Thank you for this referral.  Sandeep Keen, PT, DPT   Time Calculation: 26 mins

## 2019-08-23 NOTE — PROGRESS NOTES
Discharge order written. All Care Plans and Education are complete. UPdated AVS MAR with last dose given. Dr. Myesha Nunes provided RX for Vimpat. Provided a Medication and Side Effects Guide for patient education.
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
## 2019-08-23 NOTE — PROGRESS NOTES
Pharmacist Discharge Medication Reconciliation    Discharge Provider:  Dr. Lala Abraham       Discharge Medications:      My Medications        CHANGE how you take these medications        Instructions Each Dose to Equal Morning Noon Evening Bedtime   lacosamide 150 mg Tab tablet  Commonly known as:  VIMPAT  What changed:    medication strength  how much to take    Your last dose was: Your next dose is: Take 150 mg by mouth two (2) times a day. Max Daily Amount: 300 mg.   150 mg                        CONTINUE taking these medications        Instructions Each Dose to Equal Morning Noon Evening Bedtime   bisacodyl 10 mg suppository  Commonly known as:  DULCOLAX    Your last dose was: Your next dose is: Insert 10 mg into rectum every other day. 10 mg                 bromocriptine 2.5 mg tablet  Commonly known as:  PARLODEL    Your last dose was: Your next dose is: Take 7.5 mg by mouth two (2) times a day. 7.5 mg                 dantrolene 25 mg capsule  Commonly known as:  DANTRIUM    Your last dose was: Your next dose is: Take 100 mg by mouth four (4) times daily. 100 mg                 diazePAM 5 mg tablet  Commonly known as:  VALIUM    Your last dose was: Your next dose is: Take 2.5 mg by mouth every eight (8) hours as needed. 2.5 mg                 docusate 50 mg/5 mL liquid  Commonly known as:  COLACE    Your last dose was: Your next dose is: Take 100 mg by mouth nightly. 100 mg                 ibuprofen 600 mg tablet  Commonly known as:  MOTRIN    Your last dose was: Your next dose is: Take 600 mg by mouth every six (6) hours as needed for Pain. 600 mg                 pantoprazole 40 mg tablet  Commonly known as:  PROTONIX    Your last dose was: Your next dose is: Take 40 mg by mouth two (2) times a day.    40 mg                 raNITIdine 150 mg tablet  Commonly known as:  ZANTAC    Your last dose was: Your next dose is: Take 150 mg by mouth two (2) times daily as needed for Indigestion. 150 mg                 Senna 8.6 mg tablet  Generic drug:  senna    Your last dose was: Your next dose is: Take 2 Tabs by mouth nightly. 2 Tab                        STOP taking these medications      baclofen 10 mg tablet  Commonly known as:  LIORESAL                  Where to Get Your Medications        Information on where to get these meds will be given to you by the nurse or doctor.     Ask your nurse or doctor about these medications  lacosamide 150 mg Tab tablet                The patient's chart, MAR, and AVS were reviewed by   Mavis Maldonado, PharmD, BCPS  Contact: [phone]

## 2019-08-23 NOTE — PROGRESS NOTES
Orders received, chart reviewed and patient evaluated by physical therapy. Pending progression with skilled acute physical therapy, recommend:  Physical therapy at least 2 days/week in the home AND ensure assist and/or supervision for safety with all transfers. Full evaluation to follow.

## 2019-08-23 NOTE — PROGRESS NOTES
0730-   Bedside and Verbal shift change report given to Javy Rinaldi RN (oncoming nurse) by Sulema De Los Santos (offgoing nurse). Report included the following information SBAR, Kardex and Recent Results. ..      1300- Up in pts own chair. .    1550- Back to bed. .    1600-  VSS. Happy to go home. .    1800- Mother @ bedside. .    1900- I have reviewed discharge instructions with the patient and guardian. The patient, caregiver and guardian verbalized understanding. .      Ready to go home now. .     Dis the pt via pts own WC along with caretaker and mother. Stefany Georges

## 2019-08-23 NOTE — PROGRESS NOTES
OCCUPATIONAL THERAPY EVALUATION/DISCHARGE  Patient:  Sprain (11 y.o. male)  Date: 8/23/2019  Primary Diagnosis: Sepsis ([facility] [A41.9]  Seizure ([facility] [R56.9]       Precautions:        ASSESSMENT  Based on the objective data described below, the patient presents at baseline with self-care and mobility. Pt lives with supportive family in handicap accessible home. Pt has 24/7 care. Feel pt is safe to return home once medically cleared. Current Level of Function (ADLs/self-care): max to total assist    Functional Outcome Measure: The patient scored 10/100 on the Barthel outcome measure which is indicative of 80-90% impaired.       Other factors to consider for discharge: none noted     PLAN :  Recommend with staff:     Recommendation for discharge: (in order for the patient to meet his/her long term goals)  Occupational therapy at least 2 days/week in the home     This discharge recommendation:  A follow-up discussion with the attending provider and/or case management is planned    Equipment recommendations for successful discharge: none       SUBJECTIVE:   Patient is non-verbal    OBJECTIVE DATA SUMMARY:   HISTORY:   Past Medical History:   Diagnosis Date    Anemia 7/28/2019    Neurological disorder     traumatic brain injury    Seizures ([facility]     TBI (traumatic brain injury) ([facility]      Past Surgical History:   Procedure Laterality Date    HX CRANIOTOMY         Prior Level of Function/Environment/Context: total assist with self-care  Expanded or extensive additional review of patient history:   Home Situation  Home Environment: Private residence  One/Two Story Residence: Other (Comment)  Living Alone: No  Support Systems: Family member(s)  Patient Expects to be Discharged to[de-identified] Private residence  Current DME Used/Available at Home: Other (comment)        EXAMINATION OF PERFORMANCE DEFICITS:  Cognitive/Behavioral Status:  Neurologic State: Alert  Orientation Level: Unable to verbalize  Cognition: Follows commands             Skin: intact    Edema: none noted    Hearing: Auditory  Auditory Impairment: None    Vision/Perceptual:                                     Range of Motion:    AROM: Grossly decreased, non-functional  PROM: Grossly decreased, non-functional                      Strength:    Strength: Grossly decreased, non-functional                Coordination:  Coordination: Grossly decreased, non-functional  Fine Motor Skills-Upper: Left Impaired;Right Impaired    Gross Motor Skills-Upper: Left Impaired;Right Impaired    Tone & Sensation:    Tone: Abnormal                         Balance:  Sitting: Impaired;High guard  Standing: Impaired    Functional Mobility and Transfers for ADLs:  Bed Mobility:  Supine to Sit: Moderate assistance;Assist x2    Transfers:  Sit to Stand: Moderate assistance;Assist x2  Stand to Sit: Moderate assistance;Assist x2  Bed to Chair: Moderate assistance;Assist x2    ADL Assessment:  Feeding: Moderate assistance    Oral Facial Hygiene/Grooming: Maximum assistance    Bathing: Total assistance    Upper Body Dressing: Total assistance    Lower Body Dressing: Total assistance    Toileting: Total assistance                ADL Intervention and task modifications:                                          Therapeutic Exercise:     Functional Measure:  Barthel Index:    Bathin  Bladder: 5  Bowels: 0  Groomin  Dressin  Feedin  Mobility: 0  Stairs: 0  Toilet Use: 0  Transfer (Bed to Chair and Back): 0  Total: 10/100        Percentage of impairment   0%   1-19%   20-39%   40-59%   60-79%   80-99%   100%   Barthel Score 0-100 100 99-80 79-60 59-40 20-39 1-19   0     The Barthel ADL Index: Guidelines  1. The index should be used as a record of what a patient does, not as a record of what a patient could do. 2. The main aim is to establish degree of independence from any help, physical or verbal, however minor and for whatever reason.   3. The need for supervision renders the patient not independent. 4. A patient's performance should be established using the best available evidence. Asking the patient, friends/relatives and nurses are the usual sources, but direct observation and common sense are also important. However direct testing is not needed. 5. Usually the patient's performance over the preceding 24-48 hours is important, but occasionally longer periods will be relevant. 6. Middle categories imply that the patient supplies over 50 per cent of the effort. 7. Use of aids to be independent is allowed. Sandee Michelle., BarthelJEANNE. (3011). Functional evaluation: the Barthel Index. [address] [facility] (14)2. Sheila Madera daisy CEDRICK Hanson, Carlos Dominguez., Tena Mcpherson., [place], [address] (1999). Measuring the change indisability after inpatient rehabilitation; comparison of the responsiveness of the Barthel Index and Functional Walsh Measure. Journal of Neurology, Neurosurgery, and Psychiatry, 66(4), 488-958. Angela Johnson, N.J.A, ALIS Kumar, & Cindy Ellis M.A. (2004.) Assessment of post-stroke quality of life in cost-effectiveness studies: The usefulness of the Barthel Index and the EuroQoL-5D. Quality of Life Research, 15, 100-95       Occupational Therapy Evaluation Charge Determination   History Examination Decision-Making   LOW Complexity : Brief history review  MEDIUM Complexity : 3-5 performance deficits relating to physical, cognitive , or psychosocial skils that result in activity limitations and / or participation restrictions MEDIUM Complexity : Patient may present with comorbidities that affect occupational performnce.  Miniml to moderate modification of tasks or assistance (eg, physical or verbal ) with assesment(s) is necessary to enable patient to complete evaluation       Based on the above components, the patient evaluation is determined to be of the following complexity level: LOW   Pain Rating:  None noted    Activity Tolerance:   Good  Please refer to the flowsheet for vital signs taken during this treatment. After treatment patient left in no apparent distress:    Sitting in chair and Call bell within reach    COMMUNICATION/EDUCATION:   The patients plan of care was discussed with: Physical Therapist and Registered Nurse.     Thank you for this referral.  Natalie Meredith OTR/L  Time Calculation: 33 mins [FreeTextEntry1] : 73 year old gentleman with history of CAD s/p CABG, COPD on 02, throat cancer s/p prior tracheostomy. HTN, paroxysmal atrial fibrillation, and atrial flutter. He has a history of minimally symptomatic pAF, but over the last several months he has had atrial flutter with rapid ventricular rates and has required additional rate control. \par TTE in 8/18 revealed LVEF 25-30% whereas prior TTE had preserved LV function in 5/18. He was started on digoxin and Cardizem, and eventually converted back to sinus rhythm with bradycardia. Now s/p RF ablation for atrial flutter, 10/1/2018, with Dr. Laboy (AFib and PVI ablation was not preformed).  Due to history of bradycardia, was advised to discontinue diltiazem and digoxin following ablation. Remains on Digoxin 125mcg after misinterpreting discharge instructions.  Previously on BB but reportedly did not tolerate due to bradycardia and COPD.  Not on ACE/ARB.  Previously on Losartan but reportedly discontinued. Possibly in the setting of low BP? He does not recall when.  \par \par Since ablation reports decreased SOB/HAYDEN with less dependency on supplemental oxygen.  No CP, palpitations, dizziness or LE edema. \par

## 2019-08-25 LAB
BACTERIA SPEC CULT: NORMAL
SERVICE CMNT-IMP: NORMAL

## 2019-12-23 ENCOUNTER — APPOINTMENT (OUTPATIENT)
Dept: GENERAL RADIOLOGY | Age: 34
DRG: 177 | End: 2019-12-23
Attending: EMERGENCY MEDICINE
Payer: MEDICARE

## 2019-12-23 ENCOUNTER — HOSPITAL ENCOUNTER (INPATIENT)
Age: 34
LOS: 3 days | Discharge: HOME | DRG: 177 | End: 2019-12-26
Attending: EMERGENCY MEDICINE | Admitting: INTERNAL MEDICINE
Payer: MEDICARE

## 2019-12-23 DIAGNOSIS — R56.9 SEIZURE (HCC): ICD-10-CM

## 2019-12-23 DIAGNOSIS — D64.9 ANEMIA, UNSPECIFIED TYPE: ICD-10-CM

## 2019-12-23 DIAGNOSIS — D69.6 THROMBOCYTOPENIA (HCC): ICD-10-CM

## 2019-12-23 DIAGNOSIS — J18.9 PNEUMONIA OF RIGHT LOWER LOBE DUE TO INFECTIOUS ORGANISM: Primary | ICD-10-CM

## 2019-12-23 DIAGNOSIS — R09.02 HYPOXIC EPISODE: ICD-10-CM

## 2019-12-23 DIAGNOSIS — G40.919 BREAKTHROUGH SEIZURE (HCC): ICD-10-CM

## 2019-12-23 LAB
ALBUMIN SERPL-MCNC: 2.6 G/DL (ref 3.5–5)
ALBUMIN/GLOB SERPL: 0.5 {RATIO} (ref 1.1–2.2)
ALP SERPL-CCNC: 56 U/L (ref 45–117)
ALT SERPL-CCNC: 13 U/L (ref 12–78)
ANION GAP SERPL CALC-SCNC: 4 MMOL/L (ref 5–15)
APPEARANCE UR: ABNORMAL
AST SERPL-CCNC: 11 U/L (ref 15–37)
B PERT DNA SPEC QL NAA+PROBE: NOT DETECTED
BACTERIA URNS QL MICRO: NEGATIVE /HPF
BASOPHILS # BLD: 0.1 K/UL (ref 0–0.1)
BASOPHILS NFR BLD: 1 % (ref 0–1)
BILIRUB SERPL-MCNC: 0.1 MG/DL (ref 0.2–1)
BILIRUB UR QL: NEGATIVE
BORDETELLA PARAPERTUSSIS PCR, BORPAR: NOT DETECTED
BUN SERPL-MCNC: 13 MG/DL (ref 6–20)
BUN/CREAT SERPL: 23 (ref 12–20)
C PNEUM DNA SPEC QL NAA+PROBE: NOT DETECTED
CALCIUM SERPL-MCNC: 9 MG/DL (ref 8.5–10.1)
CHLORIDE SERPL-SCNC: 102 MMOL/L (ref 97–108)
CO2 SERPL-SCNC: 30 MMOL/L (ref 21–32)
COLOR UR: ABNORMAL
COMMENT, HOLDF: NORMAL
COMMENT, HOLDF: NORMAL
CREAT SERPL-MCNC: 0.56 MG/DL (ref 0.7–1.3)
DIFFERENTIAL METHOD BLD: ABNORMAL
EOSINOPHIL # BLD: 0.2 K/UL (ref 0–0.4)
EOSINOPHIL NFR BLD: 3 % (ref 0–7)
EPITH CASTS URNS QL MICRO: ABNORMAL /LPF
ERYTHROCYTE [DISTWIDTH] IN BLOOD BY AUTOMATED COUNT: 20.3 % (ref 11.5–14.5)
FLUAV AG NPH QL IA: NEGATIVE
FLUAV H1 2009 PAND RNA SPEC QL NAA+PROBE: NOT DETECTED
FLUAV H1 RNA SPEC QL NAA+PROBE: NOT DETECTED
FLUAV H3 RNA SPEC QL NAA+PROBE: NOT DETECTED
FLUAV SUBTYP SPEC NAA+PROBE: NOT DETECTED
FLUBV AG NOSE QL IA: NEGATIVE
FLUBV RNA SPEC QL NAA+PROBE: NOT DETECTED
GLOBULIN SER CALC-MCNC: 4.9 G/DL (ref 2–4)
GLUCOSE BLD STRIP.AUTO-MCNC: 103 MG/DL (ref 65–100)
GLUCOSE SERPL-MCNC: 90 MG/DL (ref 65–100)
GLUCOSE UR STRIP.AUTO-MCNC: NEGATIVE MG/DL
HADV DNA SPEC QL NAA+PROBE: NOT DETECTED
HCOV 229E RNA SPEC QL NAA+PROBE: NOT DETECTED
HCOV HKU1 RNA SPEC QL NAA+PROBE: NOT DETECTED
HCOV NL63 RNA SPEC QL NAA+PROBE: NOT DETECTED
HCOV OC43 RNA SPEC QL NAA+PROBE: NOT DETECTED
HCT VFR BLD AUTO: 29 % (ref 36.6–50.3)
HGB BLD-MCNC: 8.2 G/DL (ref 12.1–17)
HGB UR QL STRIP: NEGATIVE
HMPV RNA SPEC QL NAA+PROBE: NOT DETECTED
HPIV1 RNA SPEC QL NAA+PROBE: NOT DETECTED
HPIV2 RNA SPEC QL NAA+PROBE: NOT DETECTED
HPIV3 RNA SPEC QL NAA+PROBE: NOT DETECTED
HPIV4 RNA SPEC QL NAA+PROBE: NOT DETECTED
HYALINE CASTS URNS QL MICRO: ABNORMAL /LPF (ref 0–5)
IMM GRANULOCYTES # BLD AUTO: 0 K/UL (ref 0–0.04)
IMM GRANULOCYTES NFR BLD AUTO: 0 % (ref 0–0.5)
KETONES UR QL STRIP.AUTO: NEGATIVE MG/DL
LACTATE BLD-SCNC: 0.83 MMOL/L (ref 0.4–2)
LACTATE SERPL-SCNC: 0.7 MMOL/L (ref 0.4–2)
LEUKOCYTE ESTERASE UR QL STRIP.AUTO: NEGATIVE
LYMPHOCYTES # BLD: 0.8 K/UL (ref 0.8–3.5)
LYMPHOCYTES NFR BLD: 15 % (ref 12–49)
M PNEUMO DNA SPEC QL NAA+PROBE: NOT DETECTED
MAGNESIUM SERPL-MCNC: 1.9 MG/DL (ref 1.6–2.4)
MCH RBC QN AUTO: 19.3 PG (ref 26–34)
MCHC RBC AUTO-ENTMCNC: 28.3 G/DL (ref 30–36.5)
MCV RBC AUTO: 68.4 FL (ref 80–99)
MONOCYTES # BLD: 0.7 K/UL (ref 0–1)
MONOCYTES NFR BLD: 14 % (ref 5–13)
NEUTS SEG # BLD: 3.5 K/UL (ref 1.8–8)
NEUTS SEG NFR BLD: 67 % (ref 32–75)
NITRITE UR QL STRIP.AUTO: NEGATIVE
NRBC # BLD: 0 K/UL (ref 0–0.01)
NRBC BLD-RTO: 0 PER 100 WBC
PH UR STRIP: 8 [PH] (ref 5–8)
PHOSPHATE SERPL-MCNC: 3 MG/DL (ref 2.6–4.7)
PLATELET # BLD AUTO: 100 K/UL (ref 150–400)
PLATELET COMMENTS,PCOM: ABNORMAL
POTASSIUM SERPL-SCNC: 4 MMOL/L (ref 3.5–5.1)
PROT SERPL-MCNC: 7.5 G/DL (ref 6.4–8.2)
PROT UR STRIP-MCNC: NEGATIVE MG/DL
RBC # BLD AUTO: 4.24 M/UL (ref 4.1–5.7)
RBC #/AREA URNS HPF: ABNORMAL /HPF (ref 0–5)
RBC MORPH BLD: ABNORMAL
RBC MORPH BLD: ABNORMAL
RSV RNA SPEC QL NAA+PROBE: NOT DETECTED
RV+EV RNA SPEC QL NAA+PROBE: NOT DETECTED
SAMPLES BEING HELD,HOLD: NORMAL
SAMPLES BEING HELD,HOLD: NORMAL
SERVICE CMNT-IMP: ABNORMAL
SODIUM SERPL-SCNC: 136 MMOL/L (ref 136–145)
SP GR UR REFRACTOMETRY: 1.01 (ref 1–1.03)
UR CULT HOLD, URHOLD: NORMAL
UROBILINOGEN UR QL STRIP.AUTO: 0.2 EU/DL (ref 0.2–1)
WBC # BLD AUTO: 5.3 K/UL (ref 4.1–11.1)
WBC URNS QL MICRO: ABNORMAL /HPF (ref 0–4)

## 2019-12-23 PROCEDURE — 81001 URINALYSIS AUTO W/SCOPE: CPT

## 2019-12-23 PROCEDURE — 93005 ELECTROCARDIOGRAM TRACING: CPT

## 2019-12-23 PROCEDURE — 74011000258 HC RX REV CODE- 258: Performed by: PSYCHIATRY & NEUROLOGY

## 2019-12-23 PROCEDURE — 92610 EVALUATE SWALLOWING FUNCTION: CPT

## 2019-12-23 PROCEDURE — 83735 ASSAY OF MAGNESIUM: CPT

## 2019-12-23 PROCEDURE — 84100 ASSAY OF PHOSPHORUS: CPT

## 2019-12-23 PROCEDURE — 85025 COMPLETE CBC W/AUTO DIFF WBC: CPT

## 2019-12-23 PROCEDURE — 80053 COMPREHEN METABOLIC PANEL: CPT

## 2019-12-23 PROCEDURE — 74011250637 HC RX REV CODE- 250/637: Performed by: INTERNAL MEDICINE

## 2019-12-23 PROCEDURE — 87804 INFLUENZA ASSAY W/OPTIC: CPT

## 2019-12-23 PROCEDURE — 80339 ANTIEPILEPTICS NOS 1-3: CPT

## 2019-12-23 PROCEDURE — 0099U RESPIRATORY PANEL,PCR,NASOPHARYNGEAL: CPT

## 2019-12-23 PROCEDURE — 87449 NOS EACH ORGANISM AG IA: CPT

## 2019-12-23 PROCEDURE — 82962 GLUCOSE BLOOD TEST: CPT

## 2019-12-23 PROCEDURE — 74011250636 HC RX REV CODE- 250/636: Performed by: EMERGENCY MEDICINE

## 2019-12-23 PROCEDURE — 36415 COLL VENOUS BLD VENIPUNCTURE: CPT

## 2019-12-23 PROCEDURE — 87899 AGENT NOS ASSAY W/OPTIC: CPT

## 2019-12-23 PROCEDURE — 74011250636 HC RX REV CODE- 250/636: Performed by: INTERNAL MEDICINE

## 2019-12-23 PROCEDURE — 87040 BLOOD CULTURE FOR BACTERIA: CPT

## 2019-12-23 PROCEDURE — 65660000000 HC RM CCU STEPDOWN

## 2019-12-23 PROCEDURE — 94762 N-INVAS EAR/PLS OXIMTRY CONT: CPT

## 2019-12-23 PROCEDURE — 77030040831 HC BAG URINE DRNG MDII -A

## 2019-12-23 PROCEDURE — 83605 ASSAY OF LACTIC ACID: CPT

## 2019-12-23 PROCEDURE — 74011000258 HC RX REV CODE- 258: Performed by: EMERGENCY MEDICINE

## 2019-12-23 PROCEDURE — 99285 EMERGENCY DEPT VISIT HI MDM: CPT

## 2019-12-23 PROCEDURE — 71045 X-RAY EXAM CHEST 1 VIEW: CPT

## 2019-12-23 PROCEDURE — C9254 INJECTION, LACOSAMIDE: HCPCS | Performed by: PSYCHIATRY & NEUROLOGY

## 2019-12-23 PROCEDURE — 74011000258 HC RX REV CODE- 258: Performed by: INTERNAL MEDICINE

## 2019-12-23 PROCEDURE — 74011250636 HC RX REV CODE- 250/636: Performed by: PSYCHIATRY & NEUROLOGY

## 2019-12-23 RX ORDER — ACETAMINOPHEN 325 MG/1
650 TABLET ORAL
Status: DISCONTINUED | OUTPATIENT
Start: 2019-12-23 | End: 2019-12-26 | Stop reason: HOSPADM

## 2019-12-23 RX ORDER — NALOXONE HYDROCHLORIDE 0.4 MG/ML
0.4 INJECTION, SOLUTION INTRAMUSCULAR; INTRAVENOUS; SUBCUTANEOUS AS NEEDED
Status: DISCONTINUED | OUTPATIENT
Start: 2019-12-23 | End: 2019-12-26 | Stop reason: HOSPADM

## 2019-12-23 RX ORDER — BROMOCRIPTINE MESYLATE 2.5 MG/1
5 TABLET ORAL 4 TIMES DAILY
Status: DISCONTINUED | OUTPATIENT
Start: 2019-12-23 | End: 2019-12-26 | Stop reason: HOSPADM

## 2019-12-23 RX ORDER — SODIUM CHLORIDE 0.9 % (FLUSH) 0.9 %
5-40 SYRINGE (ML) INJECTION AS NEEDED
Status: DISCONTINUED | OUTPATIENT
Start: 2019-12-23 | End: 2019-12-26 | Stop reason: HOSPADM

## 2019-12-23 RX ORDER — PANTOPRAZOLE SODIUM 40 MG/1
40 TABLET, DELAYED RELEASE ORAL DAILY
Status: DISCONTINUED | OUTPATIENT
Start: 2019-12-23 | End: 2019-12-26 | Stop reason: HOSPADM

## 2019-12-23 RX ORDER — CHOLECALCIFEROL (VITAMIN D3) 125 MCG
2000 CAPSULE ORAL DAILY
COMMUNITY

## 2019-12-23 RX ORDER — DOCUSATE SODIUM 50 MG/5ML
100 LIQUID ORAL
Status: DISCONTINUED | OUTPATIENT
Start: 2019-12-23 | End: 2019-12-26 | Stop reason: HOSPADM

## 2019-12-23 RX ORDER — SENNOSIDES 8.6 MG/1
2 TABLET ORAL
Status: DISCONTINUED | OUTPATIENT
Start: 2019-12-23 | End: 2019-12-26 | Stop reason: HOSPADM

## 2019-12-23 RX ORDER — HEPARIN SODIUM 5000 [USP'U]/ML
5000 INJECTION, SOLUTION INTRAVENOUS; SUBCUTANEOUS EVERY 8 HOURS
Status: DISCONTINUED | OUTPATIENT
Start: 2019-12-23 | End: 2019-12-26 | Stop reason: HOSPADM

## 2019-12-23 RX ORDER — FACIAL-BODY WIPES
10 EACH TOPICAL EVERY OTHER DAY
Status: DISCONTINUED | OUTPATIENT
Start: 2019-12-23 | End: 2019-12-26 | Stop reason: HOSPADM

## 2019-12-23 RX ORDER — SODIUM CHLORIDE 0.9 % (FLUSH) 0.9 %
5-40 SYRINGE (ML) INJECTION EVERY 8 HOURS
Status: DISCONTINUED | OUTPATIENT
Start: 2019-12-23 | End: 2019-12-26 | Stop reason: HOSPADM

## 2019-12-23 RX ORDER — IPRATROPIUM BROMIDE AND ALBUTEROL SULFATE 2.5; .5 MG/3ML; MG/3ML
3 SOLUTION RESPIRATORY (INHALATION)
Status: DISCONTINUED | OUTPATIENT
Start: 2019-12-23 | End: 2019-12-26 | Stop reason: HOSPADM

## 2019-12-23 RX ORDER — ZINC GLUCONATE 10 MG
1000 LOZENGE ORAL
COMMUNITY
End: 2020-01-29

## 2019-12-23 RX ORDER — GUAIFENESIN 600 MG/1
600 TABLET, EXTENDED RELEASE ORAL EVERY 12 HOURS
Status: DISCONTINUED | OUTPATIENT
Start: 2019-12-23 | End: 2019-12-26 | Stop reason: HOSPADM

## 2019-12-23 RX ORDER — SODIUM CHLORIDE, SODIUM LACTATE, POTASSIUM CHLORIDE, CALCIUM CHLORIDE 600; 310; 30; 20 MG/100ML; MG/100ML; MG/100ML; MG/100ML
50 INJECTION, SOLUTION INTRAVENOUS CONTINUOUS
Status: DISCONTINUED | OUTPATIENT
Start: 2019-12-23 | End: 2019-12-26 | Stop reason: HOSPADM

## 2019-12-23 RX ORDER — DANTROLENE SODIUM 25 MG/1
100 CAPSULE ORAL 4 TIMES DAILY
Status: DISCONTINUED | OUTPATIENT
Start: 2019-12-23 | End: 2019-12-26 | Stop reason: HOSPADM

## 2019-12-23 RX ORDER — GLUCOSAM/CHONDRO/HERB 149/HYAL 750-100 MG
1 TABLET ORAL EVERY EVENING
COMMUNITY

## 2019-12-23 RX ORDER — DIAZEPAM 5 MG/1
2.5 TABLET ORAL
Status: DISCONTINUED | OUTPATIENT
Start: 2019-12-23 | End: 2019-12-26 | Stop reason: HOSPADM

## 2019-12-23 RX ORDER — LACOSAMIDE 10 MG/ML
150 SOLUTION ORAL 2 TIMES DAILY
Status: DISCONTINUED | OUTPATIENT
Start: 2019-12-23 | End: 2019-12-23

## 2019-12-23 RX ADMIN — BISACODYL 10 MG: 10 SUPPOSITORY RECTAL at 20:30

## 2019-12-23 RX ADMIN — LACOSAMIDE 150 MG: 10 SOLUTION ORAL at 09:48

## 2019-12-23 RX ADMIN — BROMOCRIPTINE MESYLATE 5 MG: 2.5 TABLET ORAL at 18:20

## 2019-12-23 RX ADMIN — DANTROLENE SODIUM 100 MG: 25 CAPSULE ORAL at 20:30

## 2019-12-23 RX ADMIN — SODIUM CHLORIDE, SODIUM LACTATE, POTASSIUM CHLORIDE, AND CALCIUM CHLORIDE 100 ML/HR: 600; 310; 30; 20 INJECTION, SOLUTION INTRAVENOUS at 18:42

## 2019-12-23 RX ADMIN — BROMOCRIPTINE MESYLATE 5 MG: 2.5 TABLET ORAL at 15:09

## 2019-12-23 RX ADMIN — BROMOCRIPTINE MESYLATE 5 MG: 2.5 TABLET ORAL at 09:48

## 2019-12-23 RX ADMIN — HEPARIN SODIUM 5000 UNITS: 5000 INJECTION INTRAVENOUS; SUBCUTANEOUS at 10:41

## 2019-12-23 RX ADMIN — GUAIFENESIN 600 MG: 600 TABLET, EXTENDED RELEASE ORAL at 20:30

## 2019-12-23 RX ADMIN — HEPARIN SODIUM 5000 UNITS: 5000 INJECTION INTRAVENOUS; SUBCUTANEOUS at 18:22

## 2019-12-23 RX ADMIN — SENNOSIDES 17.2 MG: 8.6 TABLET, FILM COATED ORAL at 21:50

## 2019-12-23 RX ADMIN — SODIUM CHLORIDE, SODIUM LACTATE, POTASSIUM CHLORIDE, AND CALCIUM CHLORIDE 100 ML/HR: 600; 310; 30; 20 INJECTION, SOLUTION INTRAVENOUS at 08:03

## 2019-12-23 RX ADMIN — GUAIFENESIN 600 MG: 600 TABLET, EXTENDED RELEASE ORAL at 09:47

## 2019-12-23 RX ADMIN — Medication 10 ML: at 21:50

## 2019-12-23 RX ADMIN — SODIUM CHLORIDE 200 MG: 9 INJECTION, SOLUTION INTRAVENOUS at 20:30

## 2019-12-23 RX ADMIN — DANTROLENE SODIUM 100 MG: 25 CAPSULE ORAL at 12:56

## 2019-12-23 RX ADMIN — PIPERACILLIN AND TAZOBACTAM 3.38 G: 3; .375 INJECTION, POWDER, LYOPHILIZED, FOR SOLUTION INTRAVENOUS at 21:50

## 2019-12-23 RX ADMIN — SODIUM CHLORIDE 1000 ML: 900 INJECTION, SOLUTION INTRAVENOUS at 03:50

## 2019-12-23 RX ADMIN — ACETAMINOPHEN 650 MG: 325 TABLET ORAL at 18:27

## 2019-12-23 RX ADMIN — BROMOCRIPTINE MESYLATE 5 MG: 2.5 TABLET ORAL at 20:30

## 2019-12-23 RX ADMIN — Medication 10 ML: at 10:42

## 2019-12-23 RX ADMIN — PIPERACILLIN AND TAZOBACTAM 3.38 G: 3; .375 INJECTION, POWDER, LYOPHILIZED, FOR SOLUTION INTRAVENOUS at 06:15

## 2019-12-23 RX ADMIN — DANTROLENE SODIUM 100 MG: 25 CAPSULE ORAL at 18:20

## 2019-12-23 RX ADMIN — PIPERACILLIN AND TAZOBACTAM 3.38 G: 3; .375 INJECTION, POWDER, LYOPHILIZED, FOR SOLUTION INTRAVENOUS at 14:34

## 2019-12-23 RX ADMIN — PANTOPRAZOLE SODIUM 40 MG: 40 TABLET, DELAYED RELEASE ORAL at 09:47

## 2019-12-23 RX ADMIN — Medication 10 ML: at 14:37

## 2019-12-23 RX ADMIN — DOCUSATE SODIUM 100 MG: 50 LIQUID ORAL at 09:47

## 2019-12-23 RX ADMIN — AZITHROMYCIN MONOHYDRATE 500 MG: 500 INJECTION, POWDER, LYOPHILIZED, FOR SOLUTION INTRAVENOUS at 06:11

## 2019-12-23 NOTE — ED NOTES
Unknown if pt is able to take meds by mouth. Unable to reach mom at this time. Pt nonverbal, not managing secretions at this time.  PO meds held at this time

## 2019-12-23 NOTE — PROGRESS NOTES
Rapid called for elevated MUSE. HR in the 130s, temp elevated. BP stable. WBC low this morning. Patient alert. Dr. Sarika Cramer called by unit nurse. Blood cultures paired and lactic drawn. No need to elevate level of care at this time.
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
## 2019-12-23 NOTE — PROGRESS NOTES
12/23/2019  11:38 AM  Pt's mother Bernadette Martinez contacted me to let staff know she will not be coming to [facility] 2/ HA, she plans to be here later today, she is sending aide who has worked w/ pt to assist w/ communication. CM updated nursing.   Lakia Ravi

## 2019-12-23 NOTE — ED PROVIDER NOTES
HYUN Valentine is a 30 y.o. male with past medical history significant for TBI and seizure who presents to the ED via EMS  for a  seizure onset:0220 am, lasted 10 min and 15 min post ictal confusion. eye  Were closed, no tonico clonic movement but he had urinary incontinence and was grinding his teeth during the episode. Mom did not give his home diazepam. She called EMS. Patient also bhad a congestion, cough, 24-48 hours ago with a mucus plug just prior to admission which brought his O2 sat into the high 80s. Patient is on Vimpat,  Patient denies fever, chills  CP/ SOB/ N/V/ diarrhea. Past Medical History:   Diagnosis Date    Anemia 7/28/2019    Neurological disorder     traumatic brain injury    Se[facility] ([facility] [address]     TBI (traumatic brain injury) ([facility] [address]        Past Surgical History:   Procedure Laterality Date    HX CRANIOTOMY           Family History:   Problem Relation Age of Onset    No Known Problems Other         reviewed.   Patient does not know       Social History     Socioeconomic History    Marital status: SINGLE     Spouse name: Not on file    Number of children: Not on file    Years of education: Not on file    Highest education level: Not on file   Occupational History    Not on file   Social Needs    Financial resource strain: Not on file    Food insecurity:     Worry: Not on file     Inability: Not on file    Transportation needs:     Medical: Not on file     Non-medical: Not on file   Tobacco Use    Smoking status: Never Smoker    Smokeless tobacco: Never Used   Substance and Sexual Activity    Alcohol use: No    Drug use: No    Sexual activity: Not on file   Lifestyle    Physical activity:     Days per week: Not on file     Minutes per session: Not on file    Stress: Not on file   Relationships    Social connections:     Talks on phone: Not on file     Gets together: Not on file     Attends Latter day service: Not on file     Active member of club or organization: Not on file     Attends meetings of clubs or organizations: Not on file     Relationship status: Not on file    Intimate partner violence:     Fear of current or ex partner: Not on file     Emotionally abused: Not on file     Physically abused: Not on file     Forced sexual activity: Not on file   Other Topics Concern    Not on file   Social History Narrative    Not on file         ALLERGIES: Patient has no known allergies. Review of Systems  As in HPI. Unable to obtain due to patient being non verbal  Vitals:    12/23/19 0331   BP: (!) 86/54   Pulse: 98   Resp: 18   Temp: 97.6 °F (36.4 °C)   SpO2: 98%   Weight: 63.5 kg (140 lb)   Height: 6' 2\" (1.88 m)            Physical Exam   General  no distress, well developed, well nourished  HEENT  oropharynx clear and moist mucous membranes  Eyes  PERRL, EOMI and Conjunctivae Clear Bilaterally  Neck   full range of motion and supple  Respiratory  Clear Breath Sounds Bilaterally, No Increased Effort and Good Air Movement Bilaterally  Cardiovascular   RRR, S1S2, No murmur and Radial/Pedal Pulses 2+/  Abdomen  soft, non tender and non distended  Skin  No Rash and Cap Refill less than 3 sec  Musculoskeletal: RUE contracture in the hands. Neurology  XII grossly intact    MDM     Seizure  Labs no leucocytosis  CXR Right lower lobe atelectasis/consolidation with small right-sided effusion. Management plan discussed with Mother. She will prefer to admit because of previous history of septicemia. Will start IV antibiotics and admit to hospitalist service.    Procedures

## 2019-12-23 NOTE — PROGRESS NOTES
[facility]: MED RECONCILIATION    Comments/Recommendations:   PTA medication list has been updated based on a medication list provided by the patient's family which was located in the patient's room. The patient's family was not present at the time the medication list was obtained from the room. The patient's mother was phoned twice by the nurse. On the initial call the patient's mother reported she would arrive at the hospital at noon. On a later call the patient's mother stated she was feeling unwell and would not arrive at the hospital today. The nurse informed the patient's mother that the pharmacist requested to review the list of medications with her to ensure accuracy. The patient's mother declined review with the pharmacist and reported the list present in the room was up to date and accurate. Allergies: Patient has no known allergies. Prior to Admission Medications:     Medication Documentation Review Audit       Reviewed by Viridiana Rubio PHARMD (Pharmacist) on 12/23/19 at 1300      Medication Sig Documenting Provider Last Dose Status Taking?   aspirin-acetaminophen-caffeine (EXCEDRIN ES) 250-250-65 mg per tablet Take 2 Tabs by mouth every six (6) hours as needed for Pain or Headache. Do not use with ibuprofen Provider, Historical  Active Yes   bisacodyl (DULCOLAX) 10 mg suppository Insert 10 mg into rectum every other day. Before night shower Carlos Israel MD  Active Yes   bromocriptine mesylate (BROMOCRIPTINE PO) Take 5 mg by mouth four (4) times daily. Patient takes at 8am, 2pm, 6pm, 8pm Provider, Historical  Active Yes   cholecalciferol, vitamin D3, (VITAMIN D3) 2,000 unit tab Take 2,000 Units by mouth daily. Provider, Historical  Active Yes   dantrolene (DANTRIUM) 100 mg capsule Take 100 mg by mouth four (4) times daily.  Patient takes at 8am, 2pm, 6pm, 8pm Provider, Historical  Active Yes   diazePAM (VALIUM) 5 mg tablet Take 2.5 mg by mouth every twelve (12) hours as needed (for [employer] spasticity or seizure). Provider, Historical  Active Yes   docusate (COLACE) 50 mg/5 mL liquid Take 100 mg by mouth nightly. Doris Paul MD  Active Yes   ibuprofen (MOTRIN) 600 mg tablet Take 600 mg by mouth every four (4) hours as needed for Pain. Provider, Historical  Active Yes   lacosamide (VIMPAT) 150 mg tab tablet Take 150 mg by mouth two (2) times a day. Max Daily Amount: 300 mg. Serg Model Dodie Melena V, DO  Active Yes   Lactobacillus acidophilus (ACIDOPHILUS) cap Take 1 Cap by mouth daily. Before morning turn Provider, Historical  Active Yes   magnesium 250 mg tab Take 1,000 mg by mouth nightly. Provider, Historical  Active Yes   omega 3-DHA-EPA-fish oil 1,000 mg (120 mg-180 mg) capsule Take 1 Cap by mouth every evening. Provider, Historical  Active Yes   pantoprazole (PROTONIX) 40 mg tablet Take 40 mg by mouth daily. Before morning turn Provider, Historical  Active Yes   raNITIdine (ZANTAC) 150 mg tablet Take 150 mg by mouth two (2) times daily as needed for Indigestion. Provider, Historical  Active Yes   senna (SENNA) 8.6 mg tablet Take 2 Tabs by mouth nightly.  Provider, Historical  Active Yes                  Thank you,      Bobby Thorpe, PharmD, BCPS

## 2019-12-23 NOTE — PROGRESS NOTES
Chart reviewed and case discussed with Dr. Verna Edwards. Agree with management as outlined. Concern for possible aspiration will hold off on po intake until speech eval. Continue abx.  Reviewed pt at bedside

## 2019-12-23 NOTE — ED NOTES
Able to contact mother, states he takes pills by mouth with apple sauce. Pt able to eat apple sauce without difficulty.

## 2019-12-23 NOTE — H&P
[facility]  [address], [facility] [address] [phone]    Hospitalist Admission Note      NAME:  Mickel Osler   :   1985   MRN:  [id number]     PCP:  Yumi German MD     Date/Time:  2019 5:40 AM         Assessment / Plan:         Pneumonia: RLL, Suspect aspiration. Start Zosyn. Continue azithromycin for atypical coverage. Send sputum culture, PNA workup (Legionella and Strep Pneumo urine ag, Mycoplasma IgM). Chest PT, guaifenesin, pulmonary toilet      Seizure: likely precipitated by infection. Continue Vimpat. Also on Valium PRN. Neurology consult      TBI (traumatic brain injury): from [address] in [facility]. . Supportive care, continue home antispasmodics, bowel regimen, etc      Anemia: chronic, suspect ACD. Hg stable, send iron panel, ferritin, b12, folate, FOBT      Thrombocytopenia: chronic, stable. ?2/2 AED. Follow PLT. Check B12       Code Status: FULL, d/w mother, see ACP note     Surrogate decision maker: mother      ED notes and lab results reviewed. Total time spent with patient: 50 Minutes   Time spent in the care of this patient included reviewing records, discussing with nursing, obtaining history and examining the patient, and discussing treatment plans, with >50% time spent counseling/coordinating care    Risk of deterioration: High                 Care Plan discussed with: ED provider, Patient, Family, Nursing Staff and >50% of time spent in counseling and coordination of care    Discussed:  Code Status, Care Plan and D/C Planning    Prophylaxis:  Hep SQ    Disposition:  Home w/Family                 Subjective:     CHIEF COMPLAINT: seizure    HISTORY OF PRESENT ILLNESS:     Mr. Lazarus Mcbride is a 30 y.o. male w/ hx of TBI, seizure d/o who presents with seizure. Had a witnessed seizure at home overnight, tonic-clonic, post-ictal for 15 minutes, prompting ED visit.  Pt's mother also noting increases WOB, coughing over the past 2 days, productive of thick sputum. No apparent fevers or chills. ED workup showed RLL PNA. Mr. Nina Maloney is admitted for further evaluation and management. Past Medical History:   Diagnosis Date    Anemia 7/28/2019    Neurological disorder     traumatic brain injury    Seizures ([facility] [address]     TBI (traumatic brain injury) ([facility] [address]         Past Surgical History:   Procedure Laterality Date    HX CRANIOTOMY         Social History     Tobacco Use    Smoking status: Never Smoker    Smokeless tobacco: Never Used   Substance Use Topics    Alcohol use: No        Family History   Problem Relation Age of Onset    No Known Problems Other         reviewed. Patient does not know      No fam hx of seizure    No Known Allergies     Prior to Admission medications    Medication Sig Start Date End Date Taking? Authorizing Provider   lacosamide (VIMPAT) 150 mg tab tablet Take 150 mg by mouth two (2) times a day. Max Daily Amount: 300 mg. 8/23/19   Mike Deleon DO   lacosamide (VIMPAT) 50 mg tab tablet Take 50 mg by mouth two (2) times a day. Max Daily Amount: 100 mg. May be combined with existing 100 mg tablets to equal 150 mg twice daily 8/23/19   Mike Deleon DO   bromocriptine (PARLODEL) 2.5 mg tablet Take 7.5 mg by mouth two (2) times a day. Provider, Historical   dantrolene (DANTRIUM) 25 mg capsule Take 100 mg by mouth four (4) times daily. Provider, Historical   senna (SENNA) 8.6 mg tablet Take 2 Tabs by mouth nightly. Provider, Historical   pantoprazole (PROTONIX) 40 mg tablet Take 40 mg by mouth two (2) times a day. Provider, Historical   ibuprofen (MOTRIN) 600 mg tablet Take 600 mg by mouth every six (6) hours as needed for Pain. Provider, Historical   raNITIdine (ZANTAC) 150 mg tablet Take 150 mg by mouth two (2) times daily as needed for Indigestion. Provider, Historical   bisacodyl (DULCOLAX) 10 mg suppository Insert 10 mg into rectum every other day.  7/31/19   Faisal Meehan MD docusate (COLACE) 50 mg/5 mL liquid Take 100 mg by mouth nightly. 7/31/19   Hans Hernandez MD   diazePAM (VALIUM) 5 mg tablet Take 2.5 mg by mouth every eight (8) hours as needed. Provider, Historical       Review of Systems:  (bold if positive, if negative)    Gen:  Eyes:  ENT:  CVS:  Pulm:  Cough, dyspnea, sputum,GI:    :    MS:  weaknessSkin:  Psych:  Endo:    Hem:  Renal:    Neuro:            Objective:      VITALS:    Vital signs reviewed; most recent are:    Visit Vitals  BP 95/67   Pulse 83   Temp 97.6 °F (36.4 °C)   Resp 16   Ht 6' 2\" (1.88 m)   Wt 63.5 kg (140 lb)   SpO2 100%   BMI 17.97 kg/m²     SpO2 Readings from Last 6 Encounters:   12/23/19 100%   08/23/19 90%   07/31/19 92%   07/23/19 100%   08/07/18 99%   05/01/18 95%        No intake or output data in the 24 hours ending 12/23/19 0540         Exam:     Physical Exam:    Gen:  Thin/frail, chronically ill-appearing. NAD  HEENT:  No scleral icterus, hearing intact to voice, dry mucous membranes  Neck:  Supple, without masses. Resp:  No accessory muscle use. Diminished R basilar breath sounds with rhonchi  Card: RRR. Normal S1 and S2 without murmurs, rubs, or gallops. No peripheral lower extremity edema. No JVD. Peripheral pulses in tact. Abd:  Normoactive bowel sounds. Soft, non-tender, non-distended. No rebound, no guarding. No appreciable hepatosplenomegaly   Musc:  No cyanosis or clubbing  Skin:  No rashes or ulcers; turgor intact. Neuro:  CP palsy. LUE contracture. Cranial nerves are grossly intact, follows commands appropriately  Psych:  Poor insight, normal affect. Alert, oriented to self.  Answers questions appropriately by giving thumbs up       Labs:    Recent Labs     12/23/19 0411   WBC 5.3   HGB 8.2*   HCT 29.0*   *     Recent Labs     12/23/19 0411      K 4.0      CO2 30   GLU 90   BUN 13   CREA 0.56*   CA 9.0   MG 1.9   PHOS 3.0   ALB 2.6*   SGOT 11*   ALT 13     No components found for: GLPOC  No results for input(s): PH, PCO2, PO2, HCO3, FIO2 in the last 72 hours. No results for input(s): INR, INREXT in the last 72 hours. No results found for: SDES  Lab Results   Component Value Date/Time    Culture result: MRSA NOT PRESENT 08/19/2019 06:28 PM    Culture result:  08/19/2019 06:28 PM         Screening of patient nares for MRSA is for surveillance purposes and, if positive, to facilitate isolation considerations in high risk settings. It is not intended for automatic decolonization interventions per se as regimens are not sufficiently effective to warrant routine use. Culture result: NO GROWTH 6 DAYS 08/19/2019 10:09 AM     All other current labs reviewed in the computer.       Imaging/Studies:    CXR: Right lower lobe atelectasis/consolidation with small right-sided effusion  Imaging personally reviewed    Tele: sinus rhythm  Personally reviewed    ___________________________________________________    Attending Physician: Ariadne Esparza MD

## 2019-12-23 NOTE — ED NOTES
TRANSFER - OUT REPORT:    Verbal report given to shaheen em(name) on George Lundborg  being transferred to [facility](unit) for routine progression of care       Report consisted of patients Situation, Background, Assessment and   Recommendations(SBAR). Information from the following report(s) SBAR, Kardex, ED Summary and MAR was reviewed with the receiving nurse. Lines:   Peripheral IV 12/23/19 Right Antecubital (Active)   Site Assessment Clean, dry, & intact 12/23/2019 10:52 AM   Phlebitis Assessment 0 12/23/2019 10:52 AM   Infiltration Assessment 0 12/23/2019 10:52 AM   Dressing Status Clean, dry, & intact 12/23/2019 10:52 AM   Dressing Type Transparent 12/23/2019 10:52 AM   Hub Color/Line Status Green 12/23/2019 10:52 AM       Peripheral IV 12/23/19 Left Forearm (Active)   Site Assessment Clean, dry, & intact 12/23/2019 10:52 AM   Phlebitis Assessment 0 12/23/2019 10:52 AM   Infiltration Assessment 0 12/23/2019 10:52 AM   Dressing Status Clean, dry, & intact 12/23/2019 10:52 AM   Dressing Type Transparent 12/23/2019 10:52 AM   Hub Color/Line Status Pink 12/23/2019 10:52 AM        Opportunity for questions and clarification was provided.       Patient transported with:   Monitor  Registered Nurse

## 2019-12-23 NOTE — PROGRESS NOTES
12/23/2019  8:49 AM  CM attempted to meet w/ pt, pt has Hx of TBI, non-verbal, no family at bedside, placed TC to mother Papi Martinez (C) [phone] lvm. Will attempt assessment again.   Nguyễn Garcia

## 2019-12-23 NOTE — PROGRESS NOTES
1750 Rapid response called:    Patient with fever and rising HR into the 130's/140's on recheck of vitals and history of sepsis/ aspiration pneumonia. Lactic Acid drawn and blood cultures drawn. Dr. Narayan Gonsales notified and per Dr. Narayan Gonsales: we are already aware of pneumonia, patient is already on antibiotics, fluids are already running at 100/hr. Per Dr. Narayan Gonsales administer tylenol which is already ordered to reduce fever. [address] to Telemetry regarding patient's persistently elevated HR ~130's to 140's. Spoke to clinical care lead regarding no new orders related to patient's tachycardia and understand that we should see HR decrease as temperature decreased. Tylenol just recently administered and has not had time to work. SEE MAR.    1930 HR downtrending into 110's and 120's Next shift aware of need to follow vitals closely and notify MD of increasing HR/Temp.

## 2019-12-23 NOTE — ACP (ADVANCE CARE PLANNING)
Advance Care Planning (ACP) Provider Note - Comprehensive      Date of ACP Conversation:  12/23/19    Persons included in Conversation:  patient and mother  Length of ACP Conversation in minutes:  16 minutes     Authorized Decision Maker (if patient is incapable of making informed decisions): This person is: pt's mother Mellisa Lopez for ALL Patients with Decision Making Capacity:  Importance of advance care planning, including choosing a healthcare agent to communicate patient's healthcare decisions if patient lost the ability to make decisions. Review of Existing Advance Directive:  Patient and family do not have a current advance directive however pt confirms that his wife would serve as his MPOA     Active Diagnoses:   Patient Active Problem List   Diagnosis Code    Pyelonephritis S10    Complicated UTI (urinary tract infection) N39.0    TBI (traumatic brain injury) ([facility] [address] S06. 9X9A    Anemia D64.9    Renal insufficiency N28.9    Sepsis (HCC) A41.9    Seizure (HCC) R56.9    Pneumonia J18.9    Thrombocytopenia ([facility]) D69.6         These active diagnoses are of sufficient risk that focused discussion on advance care planning is indicated in order to allow the patient to thoughtfully consider personal goals of care; and, if situations arise that prevent the ability to personally give input, to ensure appropriate representation of their personal desires for different levels and aggressiveness of care. For Serious or Chronic Illness:  Understanding of medical condition     Reviewed pt's clinical status. Ciro Langston has multiple medical problems including TBI, seizure d/o and is being admitted for pneumonia with suspected aspiration, seizure. We reviewed our treatment plan and anticipated discharge plans.  Further, we discussed pt's wishes on how he would like to proceed (aggressive/heroic resuscitation vs not intervening and allowing nature takes its course) if he were to suffer cardiopulmonary arrest.    Understanding of CPR, goals and expected outcomes, benefits and burdens discussed. Information on CPR success provided (many factors lower a patients chance of survival, including advanced age, performance status, malignancy, and presence of multiple comorbidities); Individual asked to communicate understanding of information in his/her own words. Patient's mother, after some careful consideration and discussion made it clear to me that she would want pt to receive heroic measures for resuscitation in the event of cardiopulmonary arrest, including chest compressions, defibrillation, intubation/mechanical ventilation.       Interventions Provided:  Pt's mother declines palliative care consult  FULL CODE

## 2019-12-23 NOTE — PROGRESS NOTES
Problem: Dysphagia (Adult)  Goal: *Acute Goals and Plan of Care (Insert Text)  Description  Swallowing goals initiated 12-23-19:  1)  Tolerate dysphagia 1, nectars   Outcome: Progressing Towards Goal   SPEECH LANGUAGE PATHOLOGY BEDSIDE SWALLOW EVALUATION  Patient: Edmund White (18 y.o. male)  Date: 12/23/2019  Primary Diagnosis: Seizure ([facility] [address] [R56.9]        Precautions: aspiration       ASSESSMENT :  Based on the objective data described below, the patient presents with moderate oral and mild-moderate pharyngeal dysphagia. Appears to be close to baseline of dysphagia 1, nectars, but also just had mucous plug. Suspect weak cough present. His positioning puts him at risk for post prandial aspiration and he had copious oral secretion, for which he is at risk for aspiration. Admitted 12-23-19 with sz. Also CXR:  RLL atelectasis, PNA and R pleural effusion. PMH: TBI 2009, affecting L frontal, temporal and R frontal lobes. Recent mucous plugs, UTI's . Patient will benefit from skilled intervention to address the above impairments. Patients rehabilitation potential is considered to be Fair     PLAN :  Recommendations and Planned Interventions:  -Dysphagia 1 / Nectar thick liquid diet.   -Upright 90 degrees. -watch for oral residue with pills  Frequency/Duration: Patient will be followed by speech-language pathology 2 times a week to address goals. Discharge Recommendations: home with family     SUBJECTIVE:   Patient did not attempt verbalizations in session and family./caregivers not present.     OBJECTIVE:     Past Medical History:   Diagnosis Date    Anemia 7/28/2019    Neurological disorder     traumatic brain injury    Seizures ([facility] [address]     TBI (traumatic brain injury) ([facility] [address]      Past Surgical History:   Procedure Laterality Date    HX CRANIOTOMY       Prior Level of Function/Home Situation:      Diet prior to admission: dysphagia 1, nectars  Current Diet:  NPO until SLP eval .   Cognitive and Communication Status:  Neurologic State: Alert  Orientation Level: Unable to verbalize  Cognition: Appropriate decision making  Perception: Appears intact     Safety/Judgement: Lack of insight into deficits  Oral Assessment:  Oral Assessment  Labial: (patieint with constant frontaldrooling and oral mouth posture)  Dentition: Limited;Natural  Oral Hygiene: WFL  Lingual: No impairment  Mandible: No impairment  P.O. Trials:  Patient Position: upright in bed  Vocal quality prior to P.O.: Aphonic  Consistency Presented: Puree;Nectar thick liquid      ORAL PHASE:   Bolus Acceptance: Impaired(mildly reduced oral opening)  Bolus Formation/Control: Impaired(pooling of material in frontal and R sulci)  Type of Impairment: Drooling  Propulsion: No impairment; Discoordination; Tongue pumping  Oral Residue: None;10-50% of bolus(with purees more than nectars)  Some difficulty with straw suck  PHARYNGEAL PHASE:   Initiation of Swallow: No impairment  Laryngeal Elevation: Functional  Aspiration Signs/Symptoms: None  Pharyngeal Phase Characteristics: (risk due to h/o dysphagia, recent mucous plug)  Effective Modifications: None                  NOMS:   The NOMS functional outcome measure was used to quantify this patient's level of swallowing impairment. Based on the NOMS, the patient was determined to be at level 4 for swallow function       NOMS Swallowing Levels:  Level 1 (CN): NPO  Level 2 (CM): NPO but takes consistency in therapy  Level 3 (CL): Takes less than 50% of nutrition p.o. and continues with nonoral feedings; and/or safe with mod cues; and/or max diet restriction  Level 4 (CK):  Safe swallow but needs mod cues; and/or mod diet restriction; and/or still requires some nonoral feeding/supplements  Level 5 (CJ): Safe swallow with min diet restriction; and/or needs min cues  Level 6 (CI): Independent with p.o.; rare cues; usually self cues; may need to avoid some foods or needs extra time  Level 7 [facility]): Independent for all cezar LIM. (2003). National Outcomes Measurement System (NOMS): Adult Speech-Language Pathology User's Guide. Pain:  Pain Scale 1: Adult Nonverbal Pain Scale  Pain Intensity 1: 0       After treatment:   Nursing notified    COMMUNICATION/EDUCATION:   Patient was educated regarding his deficit(s) of DYSPHAGIA  as this relates to his diagnosis of RLL PNA. He demonstrated Guarded understanding as evidenced by NONVERBAL STATUS. The patient's plan of care including recommendations, planned interventions, and recommended diet changes were discussed with: Registered Nurse. Patient is unable to participate in goal setting and plan of care.     Thank you for this referral.  Alfonso Alex SLP  Time Calculation: 15 mins

## 2019-12-23 NOTE — ED NOTES
Spoke with mother and she states patient takes pills in applesauce and he eats necture with purred foods. There is a speech consult in and Dr. Ilan Robin said patient could eat once cleared by speech.

## 2019-12-23 NOTE — ED NOTES
Assumed care of pt. Pt resting on stretcher, baseline non verbal. Condom cath no longer in place. Pt cleaned and then reapplied. Abx completed.

## 2019-12-23 NOTE — ED TRIAGE NOTES
As per EMS, coming from home, caretaker observed pt having a posturing seizure at 0220 lasting 10 minutes, post ictal took 15min to recover. Pt nonverbal at baseline. Last seizure 2 weeks ago.

## 2019-12-23 NOTE — PROGRESS NOTES
Primary Nurse Conner Yen RN and Alicia Soliman RN performed a dual skin assessment on this patient. Blanchable redness on bottom treated with zinc oxide cream. Will place on turn team. Princella Spring placed between legs. Towel placed under chin to catch drooling. Bilateral Heel boots placed. Patient's personal care partner is in the room.   Bhupinder score is 14

## 2019-12-23 NOTE — ED NOTES
Bedside and Verbal shift change report [facility] to [facility] (oncoming nurse) by Heriberto Murphy (offgoing nurse). Report included the following information SBAR, ED Summary, MAR and Recent Results.

## 2019-12-24 LAB
ALBUMIN SERPL-MCNC: 2.6 G/DL (ref 3.5–5)
ALBUMIN/GLOB SERPL: 0.6 {RATIO} (ref 1.1–2.2)
ALP SERPL-CCNC: 55 U/L (ref 45–117)
ALT SERPL-CCNC: 13 U/L (ref 12–78)
ANION GAP SERPL CALC-SCNC: 6 MMOL/L (ref 5–15)
AST SERPL-CCNC: 8 U/L (ref 15–37)
ATRIAL RATE: 118 BPM
BASOPHILS # BLD: 0.1 K/UL (ref 0–0.1)
BASOPHILS NFR BLD: 1 % (ref 0–1)
BILIRUB SERPL-MCNC: 0.3 MG/DL (ref 0.2–1)
BUN SERPL-MCNC: 9 MG/DL (ref 6–20)
BUN/CREAT SERPL: 17 (ref 12–20)
CALCIUM SERPL-MCNC: 8.3 MG/DL (ref 8.5–10.1)
CALCULATED P AXIS, ECG09: 31 DEGREES
CALCULATED R AXIS, ECG10: 131 DEGREES
CALCULATED T AXIS, ECG11: 1 DEGREES
CHLORIDE SERPL-SCNC: 105 MMOL/L (ref 97–108)
CO2 SERPL-SCNC: 27 MMOL/L (ref 21–32)
CREAT SERPL-MCNC: 0.52 MG/DL (ref 0.7–1.3)
DIAGNOSIS, 93000: NORMAL
DIFFERENTIAL METHOD BLD: ABNORMAL
EOSINOPHIL # BLD: 0.1 K/UL (ref 0–0.4)
EOSINOPHIL NFR BLD: 2 % (ref 0–7)
ERYTHROCYTE [DISTWIDTH] IN BLOOD BY AUTOMATED COUNT: 20 % (ref 11.5–14.5)
FERRITIN SERPL-MCNC: 24 NG/ML (ref 26–388)
FLUID CULTURE, SPNG2: NORMAL
FOLATE SERPL-MCNC: 19.4 NG/ML (ref 5–21)
GLOBULIN SER CALC-MCNC: 4.3 G/DL (ref 2–4)
GLUCOSE SERPL-MCNC: 98 MG/DL (ref 65–100)
HCT VFR BLD AUTO: 27.4 % (ref 36.6–50.3)
HGB BLD-MCNC: 7.8 G/DL (ref 12.1–17)
IMM GRANULOCYTES # BLD AUTO: 0 K/UL (ref 0–0.04)
IMM GRANULOCYTES NFR BLD AUTO: 0 % (ref 0–0.5)
IRON SATN MFR SERPL: 4 % (ref 20–50)
IRON SERPL-MCNC: 11 UG/DL (ref 35–150)
L PNEUMO1 AG UR QL IA: NEGATIVE
LACOSAMIDE SERPL-MCNC: 4.1 UG/ML (ref 5–10)
LYMPHOCYTES # BLD: 0.6 K/UL (ref 0.8–3.5)
LYMPHOCYTES NFR BLD: 11 % (ref 12–49)
MAGNESIUM SERPL-MCNC: 1.9 MG/DL (ref 1.6–2.4)
MCH RBC QN AUTO: 19.2 PG (ref 26–34)
MCHC RBC AUTO-ENTMCNC: 28.5 G/DL (ref 30–36.5)
MCV RBC AUTO: 67.3 FL (ref 80–99)
MONOCYTES # BLD: 0.8 K/UL (ref 0–1)
MONOCYTES NFR BLD: 14 % (ref 5–13)
NEUTS SEG # BLD: 3.9 K/UL (ref 1.8–8)
NEUTS SEG NFR BLD: 72 % (ref 32–75)
NRBC # BLD: 0 K/UL (ref 0–0.01)
NRBC BLD-RTO: 0 PER 100 WBC
ORGANISM ID, SPNG3: NORMAL
P-R INTERVAL, ECG05: 118 MS
PHOSPHATE SERPL-MCNC: 3.6 MG/DL (ref 2.6–4.7)
PLATELET # BLD AUTO: 172 K/UL (ref 150–400)
PLATELET COMMENTS,PCOM: ABNORMAL
PLEASE NOTE, SPNG4: NORMAL
POTASSIUM SERPL-SCNC: 3.9 MMOL/L (ref 3.5–5.1)
PROT SERPL-MCNC: 6.9 G/DL (ref 6.4–8.2)
Q-T INTERVAL, ECG07: 314 MS
QRS DURATION, ECG06: 100 MS
QTC CALCULATION (BEZET), ECG08: 440 MS
RBC # BLD AUTO: 4.07 M/UL (ref 4.1–5.7)
RBC MORPH BLD: ABNORMAL
RBC MORPH BLD: ABNORMAL
S PNEUM AG SPEC QL LA: NEGATIVE
SODIUM SERPL-SCNC: 138 MMOL/L (ref 136–145)
SPECIMEN SOURCE: NORMAL
SPECIMEN SOURCE: NORMAL
SPECIMEN, SPNG1: NORMAL
TIBC SERPL-MCNC: 284 UG/DL (ref 250–450)
VENTRICULAR RATE, ECG03: 118 BPM
VIT B12 SERPL-MCNC: 761 PG/ML (ref 193–986)
WBC # BLD AUTO: 5.5 K/UL (ref 4.1–11.1)

## 2019-12-24 PROCEDURE — 82728 ASSAY OF FERRITIN: CPT

## 2019-12-24 PROCEDURE — 65660000000 HC RM CCU STEPDOWN

## 2019-12-24 PROCEDURE — 74011250637 HC RX REV CODE- 250/637: Performed by: INTERNAL MEDICINE

## 2019-12-24 PROCEDURE — 92526 ORAL FUNCTION THERAPY: CPT

## 2019-12-24 PROCEDURE — 83735 ASSAY OF MAGNESIUM: CPT

## 2019-12-24 PROCEDURE — 36415 COLL VENOUS BLD VENIPUNCTURE: CPT

## 2019-12-24 PROCEDURE — 80053 COMPREHEN METABOLIC PANEL: CPT

## 2019-12-24 PROCEDURE — 82746 ASSAY OF FOLIC ACID SERUM: CPT

## 2019-12-24 PROCEDURE — 74011250636 HC RX REV CODE- 250/636: Performed by: INTERNAL MEDICINE

## 2019-12-24 PROCEDURE — 74011000258 HC RX REV CODE- 258: Performed by: INTERNAL MEDICINE

## 2019-12-24 PROCEDURE — 86738 MYCOPLASMA ANTIBODY: CPT

## 2019-12-24 PROCEDURE — C9254 INJECTION, LACOSAMIDE: HCPCS | Performed by: PSYCHIATRY & NEUROLOGY

## 2019-12-24 PROCEDURE — 85025 COMPLETE CBC W/AUTO DIFF WBC: CPT

## 2019-12-24 PROCEDURE — 74011250636 HC RX REV CODE- 250/636: Performed by: PSYCHIATRY & NEUROLOGY

## 2019-12-24 PROCEDURE — 83540 ASSAY OF IRON: CPT

## 2019-12-24 PROCEDURE — 74011000258 HC RX REV CODE- 258: Performed by: PSYCHIATRY & NEUROLOGY

## 2019-12-24 PROCEDURE — 82607 VITAMIN B-12: CPT

## 2019-12-24 PROCEDURE — 84100 ASSAY OF PHOSPHORUS: CPT

## 2019-12-24 RX ADMIN — PIPERACILLIN AND TAZOBACTAM 3.38 G: 3; .375 INJECTION, POWDER, LYOPHILIZED, FOR SOLUTION INTRAVENOUS at 21:55

## 2019-12-24 RX ADMIN — AZITHROMYCIN MONOHYDRATE 500 MG: 500 INJECTION, POWDER, LYOPHILIZED, FOR SOLUTION INTRAVENOUS at 06:15

## 2019-12-24 RX ADMIN — DANTROLENE SODIUM 100 MG: 25 CAPSULE ORAL at 19:10

## 2019-12-24 RX ADMIN — HEPARIN SODIUM 5000 UNITS: 5000 INJECTION INTRAVENOUS; SUBCUTANEOUS at 09:07

## 2019-12-24 RX ADMIN — HEPARIN SODIUM 5000 UNITS: 5000 INJECTION INTRAVENOUS; SUBCUTANEOUS at 03:01

## 2019-12-24 RX ADMIN — PIPERACILLIN AND TAZOBACTAM 3.38 G: 3; .375 INJECTION, POWDER, LYOPHILIZED, FOR SOLUTION INTRAVENOUS at 06:19

## 2019-12-24 RX ADMIN — BROMOCRIPTINE MESYLATE 5 MG: 2.5 TABLET ORAL at 13:11

## 2019-12-24 RX ADMIN — BROMOCRIPTINE MESYLATE 5 MG: 2.5 TABLET ORAL at 21:50

## 2019-12-24 RX ADMIN — Medication 10 ML: at 21:50

## 2019-12-24 RX ADMIN — DANTROLENE SODIUM 100 MG: 25 CAPSULE ORAL at 21:50

## 2019-12-24 RX ADMIN — SENNOSIDES 17.2 MG: 8.6 TABLET, FILM COATED ORAL at 21:59

## 2019-12-24 RX ADMIN — SODIUM CHLORIDE 200 MG: 9 INJECTION, SOLUTION INTRAVENOUS at 09:06

## 2019-12-24 RX ADMIN — BROMOCRIPTINE MESYLATE 5 MG: 2.5 TABLET ORAL at 19:11

## 2019-12-24 RX ADMIN — SODIUM CHLORIDE 200 MG: 9 INJECTION, SOLUTION INTRAVENOUS at 20:55

## 2019-12-24 RX ADMIN — BROMOCRIPTINE MESYLATE 5 MG: 2.5 TABLET ORAL at 10:00

## 2019-12-24 RX ADMIN — GUAIFENESIN 600 MG: 600 TABLET, EXTENDED RELEASE ORAL at 21:59

## 2019-12-24 RX ADMIN — DANTROLENE SODIUM 100 MG: 25 CAPSULE ORAL at 10:00

## 2019-12-24 RX ADMIN — HEPARIN SODIUM 5000 UNITS: 5000 INJECTION INTRAVENOUS; SUBCUTANEOUS at 18:27

## 2019-12-24 RX ADMIN — Medication 10 ML: at 13:11

## 2019-12-24 RX ADMIN — GUAIFENESIN 600 MG: 600 TABLET, EXTENDED RELEASE ORAL at 09:06

## 2019-12-24 RX ADMIN — DOCUSATE SODIUM 100 MG: 50 LIQUID ORAL at 21:55

## 2019-12-24 RX ADMIN — PANTOPRAZOLE SODIUM 40 MG: 40 TABLET, DELAYED RELEASE ORAL at 09:06

## 2019-12-24 RX ADMIN — PIPERACILLIN AND TAZOBACTAM 3.38 G: 3; .375 INJECTION, POWDER, LYOPHILIZED, FOR SOLUTION INTRAVENOUS at 14:27

## 2019-12-24 RX ADMIN — Medication 10 ML: at 06:20

## 2019-12-24 RX ADMIN — DANTROLENE SODIUM 100 MG: 25 CAPSULE ORAL at 13:11

## 2019-12-24 NOTE — PROGRESS NOTES
Spiritual Care Partner Volunteer visited patient in Med Surg on 12/24/19. Documented by:  Shantell Jade D.Min, M.Div.    paging service [phone]

## 2019-12-24 NOTE — PROGRESS NOTES
Nutrition Assessment:    RECOMMENDATIONS/INTERVENTION(S):   1. Continue with diet order per SLP rec's.    2. Will order Magic Cup and yogurt with all meals. Will continue to monitor PO intakes of meals/ONS, weight, GI, labs. ASSESSMENT:   12/24: 31 yo male admitted for seizure. RD assessment for low BMI. PMHx: TBI, seizures, non-verbal.  Underweight per BMI. Weight hx per EMR indicates pt has lost 4.5kg over last 5 months (6% loss - not significant for time frame). Pt being bathed at time of visit. Mother not present. Spoke with RN who states pt has good appetite and eats well. RD previously spoken with pt's mother during recent admissions - reported that pt usually has a very good appetite at home. Requires full assistance with meals. Mother previously recommended Magic Cup and yogurt be sent with all meals - will order. SLP following for diet order - on recommended diet of Pureed with Nectar thick liquids. Notes pt is high risk for aspiration because of copious secretions and placing a PEG would not reduce this. Labs reviewed. LR running at 100ml/hr. Meds: Colace, Dulcolax. No wounds noted. Diet Order: Puree  % Eaten:  No data found. Pertinent Medications: [x] Reviewed    Labs: [x] Reviewed    Anthropometrics: Height: 6' 2\" (188 cm) Weight: 63.5 kg (140 lb)    IBW (%IBW):   ( ) UBW (%UBW):   (  %)      BMI: Body mass index is 17.97 kg/m². This BMI is indicative of:   [x] Underweight    [] Normal    [] Overweight    []  Obesity    []  Extreme Obesity (BMI>40)  Estimated Nutrition Needs (Based on): 4339 Kcals/day(REE 1655 x AF 1.3 + 250) , 64 g(64-76gm (1-1.2gm/kg/d)) Protein  Carbohydrate: At Least 130 g/day  Fluids: 2401 mL/day (1 ml/kcal)    Last BM:    [x]Active     []Hyperactive  []Hypoactive       [] Absent   BS  Skin:    [x] Intact   [] Incision  [] Breakdown   [] DTI   [] Tears/Excoriation/Abrasion  []Edema [] Other:    Wt Readings from Last 30 Encounters:   12/23/19 63.5 kg (140 lb)   08/23/19 63.7 kg (140 lb 6.9 oz)   07/30/19 68 kg (149 lb 14.6 oz)   07/23/19 68 kg (150 lb)   08/07/18 68 kg (150 lb)   05/01/18 65.8 kg (145 lb)   05/01/18 64.9 kg (143 lb)   02/14/17 64.9 kg (143 lb)      NUTRITION DIAGNOSES:   Problem:  Underweight     Etiology: related to inability to consume sufficient energy to maintain appropriate weight     Signs/Symptoms: as evidenced by BMI 18      NUTRITION INTERVENTIONS:  Meals/Snacks: General/healthful diet   Supplements: Commercial supplement              GOAL:   Consume > 75% meals + ONS to promote weight gain of 0.5-1lb within next 4-6 days    Cultural, Yarsani, or Ethnic Dietary Needs: None     EDUCATION & DISCHARGE NEEDS:    [x] None Identified   [] Identified and Education Provided/Documented   [] Identified and Pt declined/was not appropriate      [x] Interdisciplinary Care Plan Reviewed/Documented    [x] Discharge Needs:  Pureed diet with Nectar thick liquids   [] No Nutrition Related Discharge Needs    NUTRITION RISK:   Pt Is At Nutrition Risk  [x]     No Nutrition Risk Identified  []       PT SEEN FOR:    []  MD Consult: []Calorie Count      []Diabetic Diet Education        []Diet Education     []Electrolyte Management     []General Nutrition Management and Supplements     []Management of Tube Feeding     []TPN Recommendations    []  RN Referral:  []MST score >=2     []Enteral/Parenteral Nutrition PTA     []Pregnant: Gestational DM or Multigestation                 [] Pressure Ulcer    [x]  Low BMI      []  Length of Stay       [] Dysphagia Diet         [] Ventilator  []  Follow-up     Previous Recommendations:   [] Implemented          [] Not Implemented          [x] Not Applicable    Previous Goal:   [] Met              [] Progressing Towards Goal              [] Not Progressing Towards Goal   [x] Not Applicable            Tunde Kunz, [address] [place] [address]  Pager [phone]  Phone [phone]

## 2019-12-24 NOTE — PROGRESS NOTES
[address]   [address]   [place], [address] [place] [address] Drive   [phone] [facility]   [phone] Fax         Epilepsy  TBI and sequelae    Vimpat 200mg bid    Pneumonia being treated  No further sz  Caregiver at bedside    Awake, alert     Spastic quad    Cont Vimpat 200mg bid on discharge  If no further sz will return here PRN and have him follow in the office    Maria Eugenia Jfefers MD

## 2019-12-24 NOTE — PROGRESS NOTES
Bedside shift change report given to Tonja-RN (oncoming nurse) by Zelda Salcedo (offgoing nurse). Report included the following information SBAR and Kardex. 0825:  Pt is motioning with contracted right arm and hand to his R upper chest. This RN has tried to determine what pt wants by asking numerous questions, and pt shakes head yes/no, but has responded no to all questions (pain, thirst, etc). Called pt's mother at home to inquire. She explained pt's R pectoral muscle cramps at times, causing pain. This is likely the cause. Asked pt if chest is hurting. He appeared to nod 'yes.' Advised pt day RN will bring in pain med (vimpat), which is scheduled. Notified day RN, Jewell Monsivais, and asked her to pass this info to next shift.

## 2019-12-24 NOTE — PROGRESS NOTES
12/24/2019   CARE MANAGEMENT NOTE:  (cross coverage). CM reviewed EMR and handoff received from ER . Pt was admitted with seizure. Reportedly, pt resides with his family s/p TBI sustained in [place] (pt is a ). Pt's mother, Basilia Orantes ([phone]) is the primary family contact. Transition Plan of Care:  1. Pt will return home with his family. He has a home health aide for additional assistance. 2.  Outpt f/u    CM will continue to follow pt until discharge.   Tristen

## 2019-12-24 NOTE — WOUND CARE
30 y.o. male with PMH including TBI, seizure disorder and Anemia. Patient had a witnessed seizure at home tonic-clonic, post-ictal for 15 minutes and presented to the ER for evaluation. Admitted for management of RLL PNA. Wound care consulted for low Bhupinder (14) and Hx of TBI. Assessment: 
Patient is resting in bed without complaint. Bedside RN and family in room to assist with assessment. Per family the patient has a spot to the right buttock that \"comes and goes\". ON assessment there is a 2cm patchy/dry area to the right inner glute. Pink discoloration with minimal blanching. There is edema to the left arm without erythema/irritation. Heels are pink/blanching (patient is in heel boots). Condom cath in place secondary to incontinence. Specialty bed surface provided for patient. Bedside RN and turn team notified. Recommendation: 
Daily with skin care apply lotion to extremities and bony prominences for protection from friction/shear. Apply Zinc barrier to the gluteal areas daily and as needed with moisture. Float heels and protect (with heel Boots). Turn Q2h while in bed (with turn team). Protect from lines and devices. When up in chair use a waffle cushion and reposition every 20 minutes.  
 
Consult as needed,  
Jeff MONET RN [facility], [facility]. 
 (2) good, crying

## 2019-12-24 NOTE — PROGRESS NOTES
Problem: Dysphagia (Adult)  Goal: *Acute Goals and Plan of Care (Insert Text)  Description  Swallowing goals initiated 12-23-19:  1)  Tolerate dysphagia 1, nectars  Without s/s aspiration by 12-26-19   Outcome: Not Progressing Towards Goal   SPEECH LANGUAGE PATHOLOGY DYSPHAGIA TREATMENT  Patient: Georgia Galvan (15 y.o. male)  Date: 12/24/2019  Diagnosis: Seizure ([address] [R56.9]   <principal problem not specified>       Precautions: aspiration      ASSESSMENT:  Patient with congestion today. His cough is weak. He has moderate oral dysphagia with residue and copious secretions, even when not eating. His pharyngeal swallow is moderately weak. He has aspiration risks, mostly after the swallow from residue and weak swallow. His most recent MBS was August 2019, which only showed aspiration of thin liquids and no residue. His cough is weak, wet, but nonproductive. CXR: RLL atelectasis/consolidation. He may have aspiration of secretions and at times PO.  PEG would not reduce aspiration of secretions. Weak cough is concerning for poor airway protection. PLAN:  Recommendations and Planned Interventions: continue:   -Dysphagia 1 / Nectar thick liquid diet.   -Upright 90 degrees, 1:1 supervision, Single sips, and  .  -small amounts at one time-half the meal to reduce chance of regurgitation with kyphotic position  Could consider MBS if congestion continues  Patient continues to benefit from skilled intervention to address the above impairments. Continue treatment per established plan of care. Discharge Recommendations: To Be Determined     SUBJECTIVE:   Patient interested in PO. OBJECTIVE:   Cognitive and Communication Status:  Neurologic State: Alert  Orientation Level: Unable to verbalize  Cognition: Unable to assess (comment)  Perception: Appears intact     Safety/Judgement: Lack of insight into deficits  Dysphagia Treatment:  Oral Assessment:     P.O.  Trials:  Patient Position: upright in bed, kyphotic position  Vocal quality prior to P.O.: (occasional low volume verbalizations-unintelligible)  Consistency Presented: Puree;Nectar thick liquid  How Presented: SLP-fed/presented;Spoon;Straw;Successive swallows   ORAL PHASE:   Bolus Acceptance: (good sips from straws)  Bolus Formation/Control: Impaired(oral holding and oral leakage)  Type of Impairment: Anterior;Posterior  Propulsion: Delayed (# of seconds); Tongue pumping  Oral Residue: Less than 10% of bolus(frontal sulcus, on L side of mouth)  PHARYNGEAL PHASE:   Initiation of Swallow: Delayed (# of seconds)(mild)  Laryngeal Elevation: Weak(mild-moderate)  Aspiration Signs/Symptoms: (with with congestion today and weak cough/ too weak to clear)     Aspiration risks: feeder status, oral care-copious oral secretions constantly, weakness in swallow, weak cough  H/o dysphagia, bed bound status with kyphotic position                   Exercises:  Laryngeal Exercises:                                                                                                                                   Pain:  Pain Scale 1: Adult Nonverbal Pain Scale  Pain Intensity 1: 0       After treatment:   Patient left in no apparent distress in bed and Nursing notified    COMMUNICATION/EDUCATION:   Patient was educated regarding his deficit(s) of dysphagia  as this relates to his diagnosis of PNA. He demonstrated Guarded understanding as evidenced by brain injury. No family present. .    The patient's plan of care including recommendations, planned interventions, and recommended diet changes were discussed with: Registered Nurse.     CATHLEEN Murray  Time Calculation: 20 mins

## 2019-12-24 NOTE — PROGRESS NOTES
Bedside and Verbal shift change report given to Elizabeth Chance RN (oncoming nurse) by Flor Alcantar RN (offgoing nurse). Report included the following information SBAR, Kardex, ED Summary, Intake/Output, MAR and Recent Results.

## 2019-12-24 NOTE — PROGRESS NOTES
Elfego Jansen [facility]  [address] [facility], [address] [place] [address]  [phone]      Medical Progress Note      NAME: Nini Wilson   :  1985  MRM:  [id number]    Date/Time: 2019  9:43 AM         Subjective:     Chief Complaint:  F/u pna    No acute events overnight. Did have a rrt yesterday afternoon for a fever. Pt appears comfortable, no increased wob. He is non verbal no further ros possible          Objective:       Vitals:          Last 24hrs VS reviewed since prior progress note.  Most recent are:    Visit Vitals  /81 (BP 1 Location: Right arm, BP Patient Position: At rest)   Pulse (!) 114   Temp 97.5 °F (36.4 °C)   Resp 22   Ht 6' 2\" (1.88 m)   Wt 63.5 kg (140 lb)   SpO2 97%   BMI 17.97 kg/m²     SpO2 Readings from Last 6 Encounters:   19 97%   19 90%   19 92%   19 100%   18 99%   18 95%            Intake/Output Summary (Last 24 hours) at 2019 0943  Last data filed at 2019 3192  Gross per 24 hour   Intake    Output 2650 ml   Net -2650 ml          Exam:     Physical Exam:    Gen:  Chronically ill appearing, in no acute distress  HEENT:  Pink conjunctivae, PERRL, hearing intact to voice, moist mucous membranes  Resp:  No accessory muscle use, rhonchi bilateral  Card:  No murmurs, normal S1, S2 without thrills, bruits or peripheral edema  Abd:  Soft, non-tender, non-distended, normoactive bowel sounds are present  Musc:  No cyanosis or clubbing  Skin:  No rashes or ulcers, skin turgor is good  Neuro:  Sp TBI, contractured,   Psych:  Poor insight, alert       Telemetry reviewed:   sinus tachy    Medications Reviewed: (see below)    Lab Data Reviewed: (see below)    ______________________________________________________________________    Medications:     Current Facility-Administered Medications   Medication Dose Route Frequency    azithromycin (ZITHROMAX) 500 mg in 0.9% sodium chloride (MBP/ADV) 250 mL  500 mg IntraVENous Q24H    bisacodyl (DULCOLAX) suppository 10 mg  10 mg Rectal EVERY OTHER DAY    bromocriptine (PARLODEL) tablet 5 mg  5 mg Oral QID    dantrolene (DANTRIUM) capsule 100 mg  100 mg Oral QID    diazePAM (VALIUM) tablet 2.5 mg  2.5 mg Oral Q8H PRN    docusate (COLACE) 50 mg/5 mL oral liquid 100 mg  100 mg Oral QHS    pantoprazole (PROTONIX) tablet 40 mg  40 mg Oral DAILY    senna (SENOKOT) tablet 17.2 mg  2 Tab Oral QHS    sodium chloride (NS) flush 5-40 mL  5-40 mL IntraVENous Q8H    sodium chloride (NS) flush 5-40 mL  5-40 mL IntraVENous PRN    acetaminophen (TYLENOL) tablet 650 mg  650 mg Oral Q6H PRN    naloxone (NARCAN) injection 0.4 mg  0.4 mg IntraVENous PRN    piperacillin-tazobactam (ZOSYN) 3.375 g in 0.9% sodium chloride (MBP/ADV) 100 mL  3.375 g IntraVENous Q8H    guaiFENesin ER (MUCINEX) tablet 600 mg  600 mg Oral Q12H    lactated Ringers infusion  100 mL/hr IntraVENous CONTINUOUS    albuterol-ipratropium (DUO-NEB) 2.5 MG-0.5 MG/3 ML  3 mL Nebulization Q6H PRN    heparin (porcine) injection 5,000 Units  5,000 Units SubCUTAneous Q8H    lacosamide (VIMPAT) 200 mg in 0.9% sodium chloride 100 mL IVPB  200 mg IntraVENous Q12H            Lab Review:     Recent Labs     12/24/19  0621 12/23/19  0411   WBC 5.5 5.3   HGB 7.8* 8.2*   HCT 27.4* 29.0*    100*     Recent Labs     12/24/19  0621 12/23/19  0411    136   K 3.9 4.0    102   CO2 27 30   GLU 98 90   BUN 9 13   CREA 0.52* 0.56*   CA 8.3* 9.0   MG 1.9 1.9   PHOS 3.6 3.0   ALB 2.6* 2.6*   SGOT 8* 11*   ALT 13 13     No components found for: GLPOC         Assessment / Plan:     Pneumonia: RLL, at risk for aspiration. Continue Zosyn and azithromycin for atypical coverage. Send sputum culture when able. RVP negative. PNA workup pending. Chest PT, guaifenesin, pulmonary toilet. Speech reviewed and cleared pt for his usual dysphagia 1 diet; they do note he is high risk for aspiration       Seizure: likely precipitated by infection. Vimpat increased by neuro. Also on Valium PRN. Neurology consult       TBI (traumatic brain injury): from [address] in [facility]. . Supportive care, continue home antispasmodics, bowel regimen, etc       Anemia: chronic, studies notable for iron deficiency. Can consider IV iron as infection improves.       Thrombocytopenia: chronic, stable. ?2/2 AED. Follow PLT.  Check B12           Total time spent with patient: 30 [address] discussed with: Patient    Discussed:  Care Plan    Prophylaxis:  Hep SQ    Disposition:  Home w/Family           ___________________________________________________    Attending Physician: Nilam Coyne MD

## 2019-12-25 PROBLEM — R56.9 SEIZURE (HCC): Chronic | Status: ACTIVE | Noted: 2019-08-19

## 2019-12-25 PROBLEM — D69.6 THROMBOCYTOPENIA (HCC): Status: RESOLVED | Noted: 2019-12-23 | Resolved: 2019-12-25

## 2019-12-25 LAB
ANION GAP SERPL CALC-SCNC: 7 MMOL/L (ref 5–15)
BUN SERPL-MCNC: 12 MG/DL (ref 6–20)
BUN/CREAT SERPL: 23 (ref 12–20)
CALCIUM SERPL-MCNC: 8.5 MG/DL (ref 8.5–10.1)
CHLORIDE SERPL-SCNC: 105 MMOL/L (ref 97–108)
CO2 SERPL-SCNC: 26 MMOL/L (ref 21–32)
CREAT SERPL-MCNC: 0.52 MG/DL (ref 0.7–1.3)
ERYTHROCYTE [DISTWIDTH] IN BLOOD BY AUTOMATED COUNT: 20 % (ref 11.5–14.5)
GLUCOSE SERPL-MCNC: 111 MG/DL (ref 65–100)
HCT VFR BLD AUTO: 25.9 % (ref 36.6–50.3)
HGB BLD-MCNC: 7.4 G/DL (ref 12.1–17)
MAGNESIUM SERPL-MCNC: 1.7 MG/DL (ref 1.6–2.4)
MCH RBC QN AUTO: 19.3 PG (ref 26–34)
MCHC RBC AUTO-ENTMCNC: 28.6 G/DL (ref 30–36.5)
MCV RBC AUTO: 67.6 FL (ref 80–99)
NRBC # BLD: 0 K/UL (ref 0–0.01)
NRBC BLD-RTO: 0 PER 100 WBC
PLATELET # BLD AUTO: 205 K/UL (ref 150–400)
PMV BLD AUTO: 9.1 FL (ref 8.9–12.9)
POTASSIUM SERPL-SCNC: 3.9 MMOL/L (ref 3.5–5.1)
RBC # BLD AUTO: 3.83 M/UL (ref 4.1–5.7)
SODIUM SERPL-SCNC: 138 MMOL/L (ref 136–145)
WBC # BLD AUTO: 4.5 K/UL (ref 4.1–11.1)

## 2019-12-25 PROCEDURE — C9254 INJECTION, LACOSAMIDE: HCPCS | Performed by: PSYCHIATRY & NEUROLOGY

## 2019-12-25 PROCEDURE — 65660000000 HC RM CCU STEPDOWN

## 2019-12-25 PROCEDURE — 74011250636 HC RX REV CODE- 250/636: Performed by: INTERNAL MEDICINE

## 2019-12-25 PROCEDURE — 36415 COLL VENOUS BLD VENIPUNCTURE: CPT

## 2019-12-25 PROCEDURE — 74011250637 HC RX REV CODE- 250/637: Performed by: INTERNAL MEDICINE

## 2019-12-25 PROCEDURE — 74011000258 HC RX REV CODE- 258: Performed by: INTERNAL MEDICINE

## 2019-12-25 PROCEDURE — 74011000258 HC RX REV CODE- 258: Performed by: PSYCHIATRY & NEUROLOGY

## 2019-12-25 PROCEDURE — 80048 BASIC METABOLIC PNL TOTAL CA: CPT

## 2019-12-25 PROCEDURE — 85027 COMPLETE CBC AUTOMATED: CPT

## 2019-12-25 PROCEDURE — 74011250636 HC RX REV CODE- 250/636: Performed by: PSYCHIATRY & NEUROLOGY

## 2019-12-25 PROCEDURE — 83735 ASSAY OF MAGNESIUM: CPT

## 2019-12-25 RX ORDER — LACOSAMIDE 100 MG/1
200 TABLET ORAL EVERY 12 HOURS
Status: DISCONTINUED | OUTPATIENT
Start: 2019-12-25 | End: 2019-12-26 | Stop reason: HOSPADM

## 2019-12-25 RX ADMIN — LACOSAMIDE 200 MG: 100 TABLET, FILM COATED ORAL at 21:44

## 2019-12-25 RX ADMIN — DANTROLENE SODIUM 100 MG: 25 CAPSULE ORAL at 08:49

## 2019-12-25 RX ADMIN — AZITHROMYCIN MONOHYDRATE 500 MG: 500 INJECTION, POWDER, LYOPHILIZED, FOR SOLUTION INTRAVENOUS at 04:21

## 2019-12-25 RX ADMIN — HEPARIN SODIUM 5000 UNITS: 5000 INJECTION INTRAVENOUS; SUBCUTANEOUS at 02:24

## 2019-12-25 RX ADMIN — ACETAMINOPHEN 650 MG: 325 TABLET ORAL at 15:18

## 2019-12-25 RX ADMIN — BISACODYL 10 MG: 10 SUPPOSITORY RECTAL at 21:45

## 2019-12-25 RX ADMIN — DANTROLENE SODIUM 100 MG: 25 CAPSULE ORAL at 21:51

## 2019-12-25 RX ADMIN — PIPERACILLIN AND TAZOBACTAM 3.38 G: 3; .375 INJECTION, POWDER, LYOPHILIZED, FOR SOLUTION INTRAVENOUS at 21:44

## 2019-12-25 RX ADMIN — DOCUSATE SODIUM 100 MG: 50 LIQUID ORAL at 21:44

## 2019-12-25 RX ADMIN — BROMOCRIPTINE MESYLATE 5 MG: 2.5 TABLET ORAL at 21:52

## 2019-12-25 RX ADMIN — IRON SUCROSE 100 MG: 20 INJECTION, SOLUTION INTRAVENOUS at 11:57

## 2019-12-25 RX ADMIN — BROMOCRIPTINE MESYLATE 5 MG: 2.5 TABLET ORAL at 13:03

## 2019-12-25 RX ADMIN — SENNOSIDES 17.2 MG: 8.6 TABLET, FILM COATED ORAL at 21:44

## 2019-12-25 RX ADMIN — DANTROLENE SODIUM 100 MG: 25 CAPSULE ORAL at 17:45

## 2019-12-25 RX ADMIN — PIPERACILLIN AND TAZOBACTAM 3.38 G: 3; .375 INJECTION, POWDER, LYOPHILIZED, FOR SOLUTION INTRAVENOUS at 05:53

## 2019-12-25 RX ADMIN — Medication 10 ML: at 14:00

## 2019-12-25 RX ADMIN — GUAIFENESIN 600 MG: 600 TABLET, EXTENDED RELEASE ORAL at 21:44

## 2019-12-25 RX ADMIN — HEPARIN SODIUM 5000 UNITS: 5000 INJECTION INTRAVENOUS; SUBCUTANEOUS at 10:00

## 2019-12-25 RX ADMIN — Medication 10 ML: at 21:46

## 2019-12-25 RX ADMIN — PANTOPRAZOLE SODIUM 40 MG: 40 TABLET, DELAYED RELEASE ORAL at 08:43

## 2019-12-25 RX ADMIN — SODIUM CHLORIDE 500 ML: 900 INJECTION, SOLUTION INTRAVENOUS at 15:18

## 2019-12-25 RX ADMIN — DANTROLENE SODIUM 100 MG: 25 CAPSULE ORAL at 13:03

## 2019-12-25 RX ADMIN — SODIUM CHLORIDE, SODIUM LACTATE, POTASSIUM CHLORIDE, AND CALCIUM CHLORIDE 50 ML/HR: 600; 310; 30; 20 INJECTION, SOLUTION INTRAVENOUS at 19:53

## 2019-12-25 RX ADMIN — Medication 10 ML: at 05:59

## 2019-12-25 RX ADMIN — SODIUM CHLORIDE 200 MG: 9 INJECTION, SOLUTION INTRAVENOUS at 08:49

## 2019-12-25 RX ADMIN — BROMOCRIPTINE MESYLATE 5 MG: 2.5 TABLET ORAL at 08:49

## 2019-12-25 RX ADMIN — HEPARIN SODIUM 5000 UNITS: 5000 INJECTION INTRAVENOUS; SUBCUTANEOUS at 17:45

## 2019-12-25 RX ADMIN — SODIUM CHLORIDE, SODIUM LACTATE, POTASSIUM CHLORIDE, AND CALCIUM CHLORIDE 100 ML/HR: 600; 310; 30; 20 INJECTION, SOLUTION INTRAVENOUS at 06:02

## 2019-12-25 RX ADMIN — PIPERACILLIN AND TAZOBACTAM 3.38 G: 3; .375 INJECTION, POWDER, LYOPHILIZED, FOR SOLUTION INTRAVENOUS at 13:03

## 2019-12-25 RX ADMIN — BROMOCRIPTINE MESYLATE 5 MG: 2.5 TABLET ORAL at 17:45

## 2019-12-25 RX ADMIN — GUAIFENESIN 600 MG: 600 TABLET, EXTENDED RELEASE ORAL at 08:43

## 2019-12-25 NOTE — PROGRESS NOTES
Bedside and Verbal shift change report given to Darcy Yates RN (oncoming nurse) by Carolann Johnson (offgoing nurse). Report included the following information SBAR, Kardex, MAR and Accordion.

## 2019-12-25 NOTE — PROGRESS NOTES
Elfego Jansen eliza [address] 81  [address] [facility], [address] [place] [address]  [phone]      Medical Progress Note      NAME: Kyle Medrano   :  1985  MRM:  [id number]    Date/Time: 2019  12:05 PM         Subjective:     Chief Complaint:  Seizure: patient is non-verbal at baseline    ROS:  (bold if positive, if negative)    Unable to obtain          Objective:       Vitals:          Last 24hrs VS reviewed since prior progress note.  Most recent are:    Visit Vitals  BP 90/57 (BP 1 Location: Left arm, BP Patient Position: At rest)   Pulse 80   Temp 98.4 °F (36.9 °C)   Resp 17   Ht 6' 2\" (1.88 m)   Wt 63.5 kg (140 lb)   SpO2 95%   BMI 17.97 kg/m²     SpO2 Readings from Last 6 Encounters:   19 95%   19 90%   19 92%   19 100%   18 99%   18 95%            Intake/Output Summary (Last 24 hours) at 2019 1205  Last data filed at 2019 0615  Gross per 24 hour   Intake 125 ml   Output 2000 ml   Net -1875 ml          Exam:     Physical Exam:    Gen:  Well-developed, chronically ill-appearing, in no acute distress  HEENT:  Pink conjunctivae, PERRL, hearing intact to voice, moist mucous membranes  Neck:  Supple, without masses, thyroid non-tender  Resp:  No accessory muscle use, clear breath sounds without wheezes rales or rhonchi  Card:  No murmurs, normal S1, S2 without thrills, bruits or peripheral edema, 2+ pedal pulses  Abd:  Soft, non-tender, non-distended, normoactive bowel sounds are present, no palpable organomegaly and no detectable hernias  Lymph:  No cervical or inguinal adenopathy  Musc:  No cyanosis or clubbing  Skin:  No rashes or ulcers, skin turgor is good, cap refill <2 sec  Neuro:  Cranial nerves cannot be adequately assessed, extremities are contracted, does not follow commands appropriately  Psych:  No insight, non-verbal, not oriented to person, place or time, awake    Medications Reviewed: (see below)    Lab Data Reviewed: (see below)    ______________________________________________________________________    Medications:     Current Facility-Administered Medications   Medication Dose Route Frequency    azithromycin (ZITHROMAX) 500 mg in 0.9% sodium chloride (MBP/ADV) 250 mL  500 mg IntraVENous Q24H    bisacodyl (DULCOLAX) suppository 10 mg  10 mg Rectal EVERY OTHER DAY    bromocriptine (PARLODEL) tablet 5 mg  5 mg Oral QID    dantrolene (DANTRIUM) capsule 100 mg  100 mg Oral QID    diazePAM (VALIUM) tablet 2.5 mg  2.5 mg Oral Q8H PRN    docusate (COLACE) 50 mg/5 mL oral liquid 100 mg  100 mg Oral QHS    pantoprazole (PROTONIX) tablet 40 mg  40 mg Oral DAILY    senna (SENOKOT) tablet 17.2 mg  2 Tab Oral QHS    sodium chloride (NS) flush 5-40 mL  5-40 mL IntraVENous Q8H    sodium chloride (NS) flush 5-40 mL  5-40 mL IntraVENous PRN    acetaminophen (TYLENOL) tablet 650 mg  650 mg Oral Q6H PRN    naloxone (NARCAN) injection 0.4 mg  0.4 mg IntraVENous PRN    piperacillin-tazobactam (ZOSYN) 3.375 g in 0.9% sodium chloride (MBP/ADV) 100 mL  3.375 g IntraVENous Q8H    guaiFENesin ER (MUCINEX) tablet 600 mg  600 mg Oral Q12H    lactated Ringers infusion  50 mL/hr IntraVENous CONTINUOUS    albuterol-ipratropium (DUO-NEB) 2.5 MG-0.5 MG/3 ML  3 mL Nebulization Q6H PRN    heparin (porcine) injection 5,000 Units  5,000 Units SubCUTAneous Q8H    lacosamide (VIMPAT) 200 mg in 0.9% sodium chloride 100 mL IVPB  200 mg IntraVENous Q12H            Lab Review:     Recent Labs     12/25/19  0147 12/24/19  0621 12/23/19  0411   WBC 4.5 5.5 5.3   HGB 7.4* 7.8* 8.2*   HCT 25.9* 27.4* 29.0*    172 100*     Recent Labs     12/25/19  0147 12/24/19  0621 12/23/19  0411    138 136   K 3.9 3.9 4.0    105 102   CO2 26 27 30   * 98 90   BUN 12 9 13   CREA 0.52* 0.52* 0.56*   CA 8.5 8.3* 9.0   MG 1.7 1.9 1.9   PHOS  --  3.6 3.0   ALB  --  2.6* 2.6*   TBILI  --  0.3 0.1*   SGOT  --  8* 11*   ALT  --  13 13     Lab Results Component Value Date/Time    Glucose (POC) 103 (H) 2019 05:41 PM    Glucose (POC) 96 2019 08:49 AM    Glucose (POC) 92 2019 06:17 AM    Glucose (POC) 70 2019 03:09 AM    Glucose (POC) 83 2019 03:47 PM     No results for input(s): PH, PCO2, PO2, HCO3, FIO2 in the last 72 hours. No results for input(s): INR, INREXT in the last 72 hours. No results found for: SDES  Lab Results   Component Value Date/Time    Culture result: NO GROWTH 2 DAYS 2019 06:13 PM    Culture result: NO GROWTH 2 DAYS 2019 06:02 AM    Culture result: MRSA NOT PRESENT 2019 06:28 PM    Culture result:  2019 06:28 PM         Screening of patient nares for MRSA is for surveillance purposes and, if positive, to facilitate isolation considerations in high risk settings. It is not intended for automatic decolonization interventions per se as regimens are not sufficiently effective to warrant routine use.             Assessment:     Principal Problem:    Pneumonia (2019)    Active Problems:    TBI (traumatic brain injury) ([facility] (2019)      Anemia (2019)      Seizure ([facility] [address] (2019)           Plan:     Principal Problem:    Pneumonia (2019)   - pneumonia work up not revealing so far, Mycoplasma pending   - presume due to aspiration   - continue chest PT, etc   - continue diet per Speech, high risk for aspiration   - Temp (24hrs), Av.2 °F (36.2 °C), Min:96.8 °F (36 °C), Max:98.4 °F (36.9 °C)     Active Problems:    TBI (traumatic brain injury) ([address] (2019)   - with resulting quadriplegia   - continue supportive care      Anemia (2019)   - Hgb slow trend down, give IV iron      Seizure ([address] (2019)   - continue Vimpat per Neurology      Total time spent in patient care: 25 minutes                  Care Plan discussed with: Patient and Nursing Staff    Discussed:  Code Status, Care Plan and D/C Planning    Prophylaxis:  Hep SQ    Disposition:  Home w/Family           ___________________________________________________    Attending Physician: Saeed Rinaldi MD

## 2019-12-25 NOTE — PROGRESS NOTES
1520 PCT notified RN that patient BP was 62/36. Rn assessed patient and took BP on leg, BP was 89/50. Temp 99.8. RN notified MD. MD ordered a 500 ml NS bolus and then reassess and inform MD. Tylenol given. 1550 Bolus complete. /57. RN will notify MD.     1900 Bedside shift change report given to Manolo Maguire RN (oncoming nurse) by [address] RN (offgoing nurse). Report included the following information SBAR, Kardex, Procedure Summary, Intake/Output, MAR and Recent Results.

## 2019-12-26 VITALS
TEMPERATURE: 97.7 F | DIASTOLIC BLOOD PRESSURE: 51 MMHG | HEIGHT: 74 IN | HEART RATE: 95 BPM | SYSTOLIC BLOOD PRESSURE: 109 MMHG | BODY MASS INDEX: 17.97 KG/M2 | RESPIRATION RATE: 20 BRPM | WEIGHT: 140 LBS | OXYGEN SATURATION: 97 %

## 2019-12-26 LAB — MAGNESIUM SERPL-MCNC: 2 MG/DL (ref 1.6–2.4)

## 2019-12-26 PROCEDURE — 74011250637 HC RX REV CODE- 250/637: Performed by: INTERNAL MEDICINE

## 2019-12-26 PROCEDURE — 83735 ASSAY OF MAGNESIUM: CPT

## 2019-12-26 PROCEDURE — 74011000258 HC RX REV CODE- 258: Performed by: INTERNAL MEDICINE

## 2019-12-26 PROCEDURE — 36415 COLL VENOUS BLD VENIPUNCTURE: CPT

## 2019-12-26 PROCEDURE — 74011250636 HC RX REV CODE- 250/636: Performed by: INTERNAL MEDICINE

## 2019-12-26 RX ORDER — LACOSAMIDE 200 MG/1
200 TABLET ORAL 2 TIMES DAILY
Qty: 60 TAB | Refills: 0 | Status: SHIPPED | OUTPATIENT
Start: 2019-12-26

## 2019-12-26 RX ORDER — AMOXICILLIN AND CLAVULANATE POTASSIUM 875; 125 MG/1; MG/1
1 TABLET, FILM COATED ORAL EVERY 12 HOURS
Qty: 10 TAB | Refills: 0 | Status: SHIPPED | OUTPATIENT
Start: 2019-12-26 | End: 2019-12-31

## 2019-12-26 RX ORDER — AZITHROMYCIN 500 MG/1
250 TABLET, FILM COATED ORAL ONCE
Qty: 1 TAB | Refills: 0 | Status: SHIPPED | OUTPATIENT
Start: 2019-12-27 | End: 2019-12-26 | Stop reason: SDUPTHER

## 2019-12-26 RX ORDER — AZITHROMYCIN 500 MG/1
500 TABLET, FILM COATED ORAL ONCE
Qty: 1 TAB | Refills: 0 | Status: SHIPPED | OUTPATIENT
Start: 2019-12-27 | End: 2019-12-27

## 2019-12-26 RX ORDER — DIAZEPAM 5 MG/1
2.5 TABLET ORAL
Qty: 20 TAB | Refills: 0 | Status: SHIPPED | OUTPATIENT
Start: 2019-12-26

## 2019-12-26 RX ADMIN — PIPERACILLIN AND TAZOBACTAM 3.38 G: 3; .375 INJECTION, POWDER, LYOPHILIZED, FOR SOLUTION INTRAVENOUS at 06:18

## 2019-12-26 RX ADMIN — LACOSAMIDE 200 MG: 100 TABLET, FILM COATED ORAL at 09:21

## 2019-12-26 RX ADMIN — BROMOCRIPTINE MESYLATE 5 MG: 2.5 TABLET ORAL at 09:26

## 2019-12-26 RX ADMIN — PANTOPRAZOLE SODIUM 40 MG: 40 TABLET, DELAYED RELEASE ORAL at 09:21

## 2019-12-26 RX ADMIN — Medication 10 ML: at 05:13

## 2019-12-26 RX ADMIN — HEPARIN SODIUM 5000 UNITS: 5000 INJECTION INTRAVENOUS; SUBCUTANEOUS at 09:22

## 2019-12-26 RX ADMIN — DANTROLENE SODIUM 100 MG: 25 CAPSULE ORAL at 09:26

## 2019-12-26 RX ADMIN — HEPARIN SODIUM 5000 UNITS: 5000 INJECTION INTRAVENOUS; SUBCUTANEOUS at 03:15

## 2019-12-26 RX ADMIN — BROMOCRIPTINE MESYLATE 5 MG: 2.5 TABLET ORAL at 13:50

## 2019-12-26 RX ADMIN — AZITHROMYCIN MONOHYDRATE 500 MG: 500 INJECTION, POWDER, LYOPHILIZED, FOR SOLUTION INTRAVENOUS at 05:09

## 2019-12-26 RX ADMIN — DANTROLENE SODIUM 100 MG: 25 CAPSULE ORAL at 13:50

## 2019-12-26 NOTE — PROGRESS NOTES
Bedside and Verbal shift change report given to MELVIN Hicks (oncoming nurse) by Atul Navas (offgoing nurse). Report included the following information SBAR and Kardex.

## 2019-12-26 NOTE — PROGRESS NOTES
12/26/2019   2:12 PM  CM met with pt's POA-mother, obtained signature on IMM2 letter and placed copy in pt's hard chart. Mother will provide transport. No further discharge needs noted for pt at this time. 11:50 AM  Pt is set to discharge this date, no further CM needs noted for pt at this time. Nursing is checking with family in regards to transport. 8:48 AM  EMR Review,  Pt was admitted with [facility]. Reportedly, pt resides with his family s/p TBI sustained in [place] (pt is a ). Pt's mother, Neha Edwards [phone]) is the primary family contact.     Transition Plan of Care:  1. Pt will return home with his family. He has a home health aide for additional assistance. 2.  Outpt f/u  3.  CM following for needs    LEONA Muller

## 2019-12-26 NOTE — DISCHARGE INSTRUCTIONS
Patient Education     HOSPITALIST DISCHARGE INSTRUCTIONS  NAME: Khushboo Hall   :  1985   MRN:  [id number]     Date/Time:  2019 10:48 AM    ADMIT DATE: 2019     DISCHARGE DATE: 2019     DISCHARGE DIAGNOSIS:  Aspiration pneumonia due to seizure    MEDICATIONS:  · It is important that you take the medication exactly as they are prescribed. · Keep your medication in the bottles provided by the pharmacist and keep a list of the medication names, dosages, and times to be taken in your wallet. · Do not take other medications without consulting your doctor. Pain Management: per above medications    What to do at Home    Recommended diet:  Dysphagia diet-pureed and Nectar thick liquids    Recommended activity: Activity as tolerated    If you experience any of the following symptoms then please call your primary care physician or return to the emergency room if you cannot get hold of your doctor:  Fever, chills, nausea, vomiting, diarrhea, change in mentation, falling, bleeding, shortness of breath    Follow Up: Follow-up Information     Follow up With Specialties Details Why Contact Info    Keena Shaffer MD Internal Medicine In 1 week  [address] [place] [id number] [phone]              Information obtained by :  I understand that if any problems occur once I am at home I am to contact my physician. I understand and acknowledge receipt of the instructions indicated above.                                                                                                                                            Physician's or R.N.'s Signature                                                                  Date/Time                                                                                                                                              Patient or Representative Signature                                                          Date/Time       Pneumonia in Teens: Care Instructions  Your Care Instructions    Pneumonia is an infection of the lungs. Most cases are caused by infections from bacteria or viruses. Pneumonia may be mild or very severe. If it is caused by bacteria, you will be treated with antibiotics. It might take a week to a few months to recover fully from pneumonia. This depends on how sick you were and whether your overall health is good. Follow-up care is a key part of your treatment and safety. Be sure to make and go to all appointments, and call your doctor if you are having problems. It's also a good idea to know your test results and keep a list of the medicines you take. How can you care for yourself at home? · If your doctor prescribed antibiotics, take them as directed. Do not stop taking the medicine just because you are feeling better. You need to take the full course of antibiotics to avoid getting sick again. · Be safe with medicines. Take your medicines exactly as prescribed. For example, your doctor may have given you medicine that makes breathing easier. Call your doctor if you think you are having a problem with your medicine. · Get plenty of rest and sleep. You may feel weak and tired for a while, but your energy level will improve with time. · To prevent dehydration, drink plenty of fluids, enough so that your urine is light yellow or clear like water. Choose water and other caffeine-free clear liquids until you feel better. If you have kidney, heart, or liver disease and have to limit fluids, talk with your doctor before you increase the amount of fluids you drink. · Take care of your cough so you can rest. A cough that brings up mucus from your lungs is common with pneumonia. It is one way your body gets rid of the infection. But if a cough keeps you from resting or causes severe fatigue and chest-wall pain, talk to your doctor. He or she may suggest that you take a medicine to reduce the cough.   · Use a vaporizer or humidifier to add moisture to your bedroom. Follow the directions for cleaning the machine. Dry air makes coughing worse. · Do not smoke or allow others to smoke around you. Smoke will make your cough last longer. If you need help quitting, talk to your doctor about stop-smoking programs and medicines. These can increase your chances of quitting for good. · Take an over-the-counter pain medicine, such as acetaminophen (Tylenol), ibuprofen (Advil, Motrin), or naproxen (Aleve). Read and follow all instructions on the label. No one younger than 20 should take aspirin. It has been linked to Reye syndrome, a serious illness. · Be careful when taking over-the-counter cold or flu medicines and Tylenol at the same time. Many of these medicines have acetaminophen, which is Tylenol. Read the labels to make sure that you are not taking more than the recommended dose. Too much acetaminophen (Tylenol) can be harmful. · If you were given a spirometer to measure how well your lungs are working, use it as instructed. This can help your doctor tell how your recovery is going. · To prevent pneumonia in the future, talk to your doctor about getting a flu vaccine every year. When should you call for help? Call 911 anytime you think you may need emergency care. For example, call if:    · You have severe trouble breathing.    Call your doctor now or seek immediate medical care if:    · You have new or worse trouble breathing.     · You cough up dark brown or bloody mucus (sputum).     · You have a new or higher fever.     · You have a new rash.    Watch closely for changes in your health, and be sure to contact your doctor if:    · You cough more deeply or more often, especially if you notice more mucus or a change in the color of your mucus.     · You do not get better as expected. Where can you learn more? Go to http://mohan-steven.info/.   Enter [phone] in the search box to learn more about \"Pneumonia in Teens: Care Instructions. \"  Current as of: June 9, 2019  Content Version: 12.2  © 7730-0939 [employer]. Care instructions adapted under license by [employer] (which disclaims liability or warranty for this information). If you have questions about a medical condition or this instruction, always ask your healthcare professional. [place] [address] any warranty or liability for your use of this information.

## 2019-12-26 NOTE — PROGRESS NOTES
Bedside shift change report given to Fabiola (oncoming nurse) by Manolo Maguire (offgoing nurse). Report included the following information SBAR, Kardex, Intake/Output and Recent Results. Discharge medications reviewed with caregiver and appropriate educational materials and side effects teaching were provided. I have reviewed discharge instructions with the caregiver. The guardian verbalized understanding. 46- spoke with MD Marquita Whitehead and was advised that he will be sending the azithromycin rx to pt pharmacy.

## 2019-12-26 NOTE — PROGRESS NOTES
Elfego Jansen [facility]  [address] [facility], [address] [place] [address]  [phone]      Medical Progress Note      NAME: Edis Scanlon   :  1985  MRM:  [id number]    Date/Time: 2019  11:05 AM          Subjective:     Chief Complaint:  Seizure: patient is non-verbal at baseline    ROS:  (bold if positive, if negative)    Unable to obtain          Objective:       Vitals:          Last 24hrs VS reviewed since prior progress note.  Most recent are:    Visit Vitals  /51 (BP 1 Location: Left arm, BP Patient Position: At rest;Hip tilt, left)   Pulse 95   Temp 97.7 °F (36.5 °C)   Resp 20   Ht 6' 2\" (1.88 m)   Wt 63.5 kg (140 lb)   SpO2 97%   BMI 17.97 kg/m²     SpO2 Readings from Last 6 Encounters:   19 97%   19 90%   19 92%   19 100%   18 99%   18 95%            Intake/Output Summary (Last 24 hours) at 2019 1105  Last data filed at 2019 4151  Gross per 24 hour   Intake 1225 ml   Output 3275 ml   Net -2050 ml          Exam:     Physical Exam:    Gen:  Well-developed, chronically ill-appearing, in no acute distress  HEENT:  Pink conjunctivae, PERRL, hearing intact to voice, moist mucous membranes  Neck:  Supple, without masses, thyroid non-tender  Resp:  No accessory muscle use, clear breath sounds without wheezes rales or rhonchi  Card:  No murmurs, normal S1, S2 without thrills, bruits or peripheral edema, 2+ pedal pulses  Abd:  Soft, non-tender, non-distended, normoactive bowel sounds are present, no palpable organomegaly and no detectable hernias  Lymph:  No cervical or inguinal adenopathy  Musc:  No cyanosis or clubbing  Skin:  No rashes or ulcers, skin turgor is good, cap refill <2 sec  Neuro:  Cranial nerves cannot be adequately assessed, extremities are contracted, does not follow commands appropriately  Psych:  No insight, non-verbal, not oriented to person, place or time, awake    Medications Reviewed: (see below)    Lab Data Reviewed: (see below)    ______________________________________________________________________    Medications:     Current Facility-Administered Medications   Medication Dose Route Frequency    lacosamide (VIMPAT) tablet 200 mg  200 mg Oral Q12H    azithromycin (ZITHROMAX) 500 mg in 0.9% sodium chloride (MBP/ADV) 250 mL  500 mg IntraVENous Q24H    bisacodyl (DULCOLAX) suppository 10 mg  10 mg Rectal EVERY OTHER DAY    bromocriptine (PARLODEL) tablet 5 mg  5 mg Oral QID    dantrolene (DANTRIUM) capsule 100 mg  100 mg Oral QID    diazePAM (VALIUM) tablet 2.5 mg  2.5 mg Oral Q8H PRN    docusate (COLACE) 50 mg/5 mL oral liquid 100 mg  100 mg Oral QHS    pantoprazole (PROTONIX) tablet 40 mg  40 mg Oral DAILY    senna (SENOKOT) tablet 17.2 mg  2 Tab Oral QHS    sodium chloride (NS) flush 5-40 mL  5-40 mL IntraVENous Q8H    sodium chloride (NS) flush 5-40 mL  5-40 mL IntraVENous PRN    acetaminophen (TYLENOL) tablet 650 mg  650 mg Oral Q6H PRN    naloxone (NARCAN) injection 0.4 mg  0.4 mg IntraVENous PRN    piperacillin-tazobactam (ZOSYN) 3.375 g in 0.9% sodium chloride (MBP/ADV) 100 mL  3.375 g IntraVENous Q8H    guaiFENesin ER (MUCINEX) tablet 600 mg  600 mg Oral Q12H    lactated Ringers infusion  50 mL/hr IntraVENous CONTINUOUS    albuterol-ipratropium (DUO-NEB) 2.5 MG-0.5 MG/3 ML  3 mL Nebulization Q6H PRN    heparin (porcine) injection 5,000 Units  5,000 Units SubCUTAneous Q8H            Lab Review:     Recent Labs     12/25/19  0147 12/24/19  0621   WBC 4.5 5.5   HGB 7.4* 7.8*   HCT 25.9* 27.4*    172     Recent Labs     12/26/19  0520 12/25/19  0147 12/24/19  0621   NA  --  138 138   K  --  3.9 3.9   CL  --  105 105   CO2  --  26 27   GLU  --  111* 98   BUN  --  12 9   CREA  --  0.52* 0.52*   CA  --  8.5 8.3*   MG 2.0 1.7 1.9   PHOS  --   --  3.6   ALB  --   --  2.6*   TBILI  --   --  0.3   SGOT  --   --  8*   ALT  --   --  13     Lab Results   Component Value Date/Time    Glucose (POC) 103 (H) 2019 05:41 PM    Glucose (POC) 96 2019 08:49 AM    Glucose (POC) 92 2019 06:17 AM    Glucose (POC) 70 2019 03:09 AM    Glucose (POC) 83 2019 03:47 PM     No results for input(s): PH, PCO2, PO2, HCO3, FIO2 in the last 72 hours. No results for input(s): INR, INREXT, INREXT in the last 72 hours. No results found for: SDES  Lab Results   Component Value Date/Time    Culture result: NO GROWTH 3 DAYS 2019 06:13 PM    Culture result: NO GROWTH 3 DAYS 2019 06:02 AM    Culture result: MRSA NOT PRESENT 2019 06:28 PM    Culture result:  2019 06:28 PM         Screening of patient nares for MRSA is for surveillance purposes and, if positive, to facilitate isolation considerations in high risk settings. It is not intended for automatic decolonization interventions per se as regimens are not sufficiently effective to warrant routine use.             Assessment:     Principal Problem:    Pneumonia (2019)    Active Problems:    TBI (traumatic brain injury) ([facility] [address] (2019)      Anemia (2019)      Seizure ([address] (2019)           Plan:     Principal Problem:    Pneumonia (2019)   - pneumonia work up not revealing so far, Mycoplasma still pending   - will discharge home on oral Augmentia and complete 5th day of Azithromycin    - presume due to aspiration   - continue chest PT, etc   - continue diet per Speech   - Temp (24hrs), Av.2 °F (36.8 °C), Min:97.7 °F (36.5 °C), Max:98.6 °F (37 °C)     Active Problems:    TBI (traumatic brain injury) ([address] (2019)   - with resulting quadriplegia   - continue supportive care      Anemia (2019)   - Hgb slow trend down, give IV iron      Seizure ([address] (2019)   - continue Vimpat per Neurology      Total time spent in patient care: 25 minutes                  Care Plan discussed with: Patient and Nursing Staff    Discussed:  Code Status, Care Plan and D/C Planning    Prophylaxis:  Hep SQ    Disposition: Home w/Family           ___________________________________________________    Attending Physician: Stephanie Patel MD

## 2019-12-26 NOTE — DISCHARGE SUMMARY
Physician Discharge Summary     Patient ID:  Wild Valentine  [id number]  89 y.o.  1985    Admit date: 12/23/2019    Discharge date: 12/26/2019    Admission Diagnoses: Seizure ([facility] [R56.9]    Discharge Diagnoses:  Principal Diagnosis Pneumonia                                            Principal Problem:    Pneumonia (12/23/2019)    Active Problems:    TBI (traumatic brain injury) ([facility] (7/25/2019)      Anemia (7/28/2019)      Seizure ([facility] (8/19/2019)         Resolved Problems:  Problem List as of 12/26/2019 Date Reviewed: 12/23/2019          Codes Class Noted - Resolved    * (Principal) Pneumonia ICD-10-CM: J18.9  ICD-9-CM: 486  12/23/2019 - Present        Sepsis ([facility] ICD-10-CM: A41.9  ICD-9-CM: 038.9, 995.91  8/19/2019 - Present        Seizure ([facility] (Chronic) ICD-10-CM: R56.9  ICD-9-CM: 780.39  8/19/2019 - Present        Renal insufficiency ICD-10-CM: N28.9  ICD-9-CM: 593.9  7/29/2019 - Present        Anemia (Chronic) ICD-10-CM: D64.9  ICD-9-CM: 285.9  7/28/2019 - Present        Pyelonephritis ICD-10-CM: N12  ICD-9-CM: 590.80  7/25/2019 - Present        Complicated UTI (urinary tract infection) ICD-10-CM: N39.0  ICD-9-CM: 599.0  7/25/2019 - Present        TBI (traumatic brain injury) ([facility] (Chronic) ICD-10-CM: S06. 9X9A  ICD-9-CM: 854.00  7/25/2019 - Present        RESOLVED: Thrombocytopenia ([facility] ICD-10-CM: D69.6  ICD-9-CM: 287.5  12/23/2019 - 12/25/2019        RESOLVED: Hypokalemia ICD-10-CM: E87.6  ICD-9-CM: 276.8  7/29/2019 - 7/29/2019        RESOLVED: Septic shock ([facility] ICD-10-CM: A41.9, R65.21  ICD-9-CM: 038.9, 785.52, 995.92  7/27/2019 - 7/30/2019        RESOLVED: Sepsis ([facility] ICD-10-CM: A41.9  ICD-9-CM: 038.9, 995.91  7/25/2019 - 7/27/2019                Hospital Course:   Mr. Isa Pathak was admitted to the Hospitalist Service on the 5th floor for treatment of aspiration pneumonia precipitated by a seizure. He was treated with IV antibiotics with improvement.   Neurology was consulted and increased his Vimpat. He had no further seizures. Fever curve trended down. Pneumonia work up was not revealing. Speech assessed him and recommended continuing pureed diet with nectar thick liquids but felt he was high risk for intermittent aspiration. He was discharged home on 12/26/2019 in improved condition. PCP: Issac Arevalo MD    Consults: Neurology    Discharge Exam:  See my Progress Note from today. Disposition: home    Patient Instructions:   Current Discharge Medication List      START taking these medications    Details   azithromycin (ZITHROMAX) 500 mg tab Take 0.5 Tabs by mouth once for 1 dose. Qty: 1 Tab, Refills: 0      amoxicillin-clavulanate (AUGMENTIN) 875-125 mg per tablet Take 1 Tab by mouth every twelve (12) hours for 5 days. Qty: 10 Tab, Refills: 0         CONTINUE these medications which have CHANGED    Details   lacosamide (VIMPAT) 200 mg tab tablet Take 1 Tab by mouth two (2) times a day. Max Daily Amount: 400 mg. Qty: 60 Tab, Refills: 0    Associated Diagnoses: Seizure (HCC)      diazePAM (VALIUM) 5 mg tablet Take 0.5 Tabs by mouth every eight (8) hours as needed (for excssive spasticity or seizure). Max Daily Amount: 7.5 mg.  Qty: 20 Tab, Refills: 0    Associated Diagnoses: Seizure ([address]         CONTINUE these medications which have NOT CHANGED    Details   aspirin-acetaminophen-caffeine (EXCEDRIN ES) 250-250-65 mg per tablet Take 2 Tabs by mouth every six (6) hours as needed for Pain or Headache. Do not use with ibuprofen      cholecalciferol, vitamin D3, (VITAMIN D3) 2,000 unit tab Take 2,000 Units by mouth daily. omega 3-DHA-EPA-fish oil 1,000 mg (120 mg-180 mg) capsule Take 1 Cap by mouth every evening.      magnesium 250 mg tab Take 1,000 mg by mouth nightly. Lactobacillus acidophilus (ACIDOPHILUS) cap Take 1 Cap by mouth daily. Before morning turn      bromocriptine mesylate (BROMOCRIPTINE PO) Take 5 mg by mouth four (4) times daily.  Patient takes at 8am, 2pm, 6pm, 8pm      dantrolene (DANTRIUM) 100 mg capsule Take 100 mg by mouth four (4) times daily. Patient takes at 8am, 2pm, 6pm, 8pm      senna (SENNA) 8.6 mg tablet Take 2 Tabs by mouth nightly. pantoprazole (PROTONIX) 40 mg tablet Take 40 mg by mouth daily. Before morning turn      ibuprofen (MOTRIN) 600 mg tablet Take 600 mg by mouth every four (4) hours as needed for Pain. raNITIdine (ZANTAC) 150 mg tablet Take 150 mg by mouth two (2) times daily as needed for Indigestion. bisacodyl (DULCOLAX) 10 mg suppository Insert 10 mg into rectum every other day. Before night shower      docusate (COLACE) 50 mg/5 mL liquid Take 100 mg by mouth nightly. Activity: Activity as tolerated  Diet: Dysphagia diet-pureed and Nectar thick liquids  Wound Care: None needed    Follow-up Information     Follow up With Specialties Details Why Contact Info    Sanaz Schultz MD Internal Medicine In 1 week  [address] [place] [id number] [phone]            35 minutes were spent on this discharge.     Signed:  Viral Sweet MD  12/26/2019  11:12 AM

## 2019-12-27 LAB
M PNEUMO IGG SER IA-ACNC: 105 U/ML (ref 0–99)
M PNEUMO IGM SER IA-ACNC: <770 U/ML (ref 0–769)

## 2019-12-28 LAB
BACTERIA SPEC CULT: NORMAL
SERVICE CMNT-IMP: NORMAL

## 2019-12-29 LAB
BACTERIA SPEC CULT: NORMAL
SERVICE CMNT-IMP: NORMAL

## 2020-01-29 ENCOUNTER — APPOINTMENT (OUTPATIENT)
Dept: GENERAL RADIOLOGY | Age: 35
DRG: 871 | End: 2020-01-29
Attending: EMERGENCY MEDICINE
Payer: MEDICARE

## 2020-01-29 ENCOUNTER — HOSPITAL ENCOUNTER (INPATIENT)
Age: 35
LOS: 8 days | Discharge: HOME HEALTH | DRG: 871 | End: 2020-02-06
Attending: EMERGENCY MEDICINE | Admitting: INTERNAL MEDICINE
Payer: MEDICARE

## 2020-01-29 DIAGNOSIS — J90 PLEURAL EFFUSION ON RIGHT: ICD-10-CM

## 2020-01-29 DIAGNOSIS — G40.909 SEIZURE DISORDER (HCC): Primary | ICD-10-CM

## 2020-01-29 DIAGNOSIS — R50.9 FEVER, UNSPECIFIED FEVER CAUSE: ICD-10-CM

## 2020-01-29 DIAGNOSIS — D72.819 LEUKOPENIA, UNSPECIFIED TYPE: ICD-10-CM

## 2020-01-29 DIAGNOSIS — Z71.89 GOALS OF CARE, COUNSELING/DISCUSSION: ICD-10-CM

## 2020-01-29 DIAGNOSIS — Z71.89 DNR (DO NOT RESUSCITATE) DISCUSSION: ICD-10-CM

## 2020-01-29 DIAGNOSIS — K92.0 HEMATEMESIS WITH NAUSEA: ICD-10-CM

## 2020-01-29 DIAGNOSIS — R53.81 DEBILITY: ICD-10-CM

## 2020-01-29 DIAGNOSIS — D64.9 CHRONIC ANEMIA: ICD-10-CM

## 2020-01-29 DIAGNOSIS — Z71.89 ADVANCED CARE PLANNING/COUNSELING DISCUSSION: ICD-10-CM

## 2020-01-29 PROBLEM — N12 PYELONEPHRITIS: Status: RESOLVED | Noted: 2019-07-25 | Resolved: 2020-01-29

## 2020-01-29 PROBLEM — R65.10 SIRS (SYSTEMIC INFLAMMATORY RESPONSE SYNDROME) (HCC): Status: ACTIVE | Noted: 2020-01-29

## 2020-01-29 PROBLEM — J18.9 PNEUMONIA: Status: RESOLVED | Noted: 2019-12-23 | Resolved: 2020-01-29

## 2020-01-29 PROBLEM — A41.9 SEPSIS (HCC): Status: RESOLVED | Noted: 2019-08-19 | Resolved: 2020-01-29

## 2020-01-29 PROBLEM — K92.2 GI BLEED: Status: ACTIVE | Noted: 2020-01-29

## 2020-01-29 LAB
ALBUMIN SERPL-MCNC: 2.9 G/DL (ref 3.5–5)
ALBUMIN/GLOB SERPL: 0.6 {RATIO} (ref 1.1–2.2)
ALP SERPL-CCNC: 61 U/L (ref 45–117)
ALT SERPL-CCNC: 19 U/L (ref 12–78)
AMORPH CRY URNS QL MICRO: ABNORMAL
ANION GAP SERPL CALC-SCNC: 7 MMOL/L (ref 5–15)
APPEARANCE UR: ABNORMAL
AST SERPL-CCNC: 25 U/L (ref 15–37)
ATRIAL RATE: 90 BPM
B PERT DNA SPEC QL NAA+PROBE: NOT DETECTED
BACTERIA URNS QL MICRO: NEGATIVE /HPF
BASOPHILS # BLD: 0 K/UL (ref 0–0.1)
BASOPHILS NFR BLD: 0 % (ref 0–1)
BILIRUB SERPL-MCNC: 0.2 MG/DL (ref 0.2–1)
BILIRUB UR QL: NEGATIVE
BORDETELLA PARAPERTUSSIS PCR, BORPAR: NOT DETECTED
BUN SERPL-MCNC: 15 MG/DL (ref 6–20)
BUN/CREAT SERPL: 24 (ref 12–20)
C PNEUM DNA SPEC QL NAA+PROBE: NOT DETECTED
CALCIUM SERPL-MCNC: 8 MG/DL (ref 8.5–10.1)
CALCULATED P AXIS, ECG09: 19 DEGREES
CALCULATED R AXIS, ECG10: -26 DEGREES
CALCULATED T AXIS, ECG11: 14 DEGREES
CHLORIDE SERPL-SCNC: 99 MMOL/L (ref 97–108)
CO2 SERPL-SCNC: 26 MMOL/L (ref 21–32)
COLOR UR: ABNORMAL
COMMENT, HOLDF: NORMAL
COMMENT, HOLDF: NORMAL
CREAT SERPL-MCNC: 0.63 MG/DL (ref 0.7–1.3)
DIAGNOSIS, 93000: NORMAL
DIFFERENTIAL METHOD BLD: ABNORMAL
EOSINOPHIL # BLD: 0 K/UL (ref 0–0.4)
EOSINOPHIL NFR BLD: 0 % (ref 0–7)
EPITH CASTS URNS QL MICRO: ABNORMAL /LPF
ERYTHROCYTE [DISTWIDTH] IN BLOOD BY AUTOMATED COUNT: 19 % (ref 11.5–14.5)
FLUAV AG NPH QL IA: NEGATIVE
FLUAV H1 2009 PAND RNA SPEC QL NAA+PROBE: NOT DETECTED
FLUAV H1 RNA SPEC QL NAA+PROBE: NOT DETECTED
FLUAV H3 RNA SPEC QL NAA+PROBE: NOT DETECTED
FLUAV SUBTYP SPEC NAA+PROBE: NOT DETECTED
FLUBV AG NOSE QL IA: NEGATIVE
FLUBV RNA SPEC QL NAA+PROBE: NOT DETECTED
GASTROCULT GAST QL: POSITIVE
GLOBULIN SER CALC-MCNC: 5 G/DL (ref 2–4)
GLUCOSE SERPL-MCNC: 110 MG/DL (ref 65–100)
GLUCOSE UR STRIP.AUTO-MCNC: NEGATIVE MG/DL
HADV DNA SPEC QL NAA+PROBE: NOT DETECTED
HCOV 229E RNA SPEC QL NAA+PROBE: NOT DETECTED
HCOV HKU1 RNA SPEC QL NAA+PROBE: NOT DETECTED
HCOV NL63 RNA SPEC QL NAA+PROBE: NOT DETECTED
HCOV OC43 RNA SPEC QL NAA+PROBE: NOT DETECTED
HCT VFR BLD AUTO: 29.9 % (ref 36.6–50.3)
HGB BLD-MCNC: 8.5 G/DL (ref 12.1–17)
HGB UR QL STRIP: NEGATIVE
HMPV RNA SPEC QL NAA+PROBE: NOT DETECTED
HPIV1 RNA SPEC QL NAA+PROBE: NOT DETECTED
HPIV2 RNA SPEC QL NAA+PROBE: NOT DETECTED
HPIV3 RNA SPEC QL NAA+PROBE: NOT DETECTED
HPIV4 RNA SPEC QL NAA+PROBE: NOT DETECTED
IMM GRANULOCYTES # BLD AUTO: 0 K/UL (ref 0–0.04)
IMM GRANULOCYTES NFR BLD AUTO: 0 % (ref 0–0.5)
KETONES UR QL STRIP.AUTO: NEGATIVE MG/DL
LACTATE SERPL-SCNC: 1.2 MMOL/L (ref 0.4–2)
LEUKOCYTE ESTERASE UR QL STRIP.AUTO: NEGATIVE
LIPASE SERPL-CCNC: 154 U/L (ref 73–393)
LYMPHOCYTES # BLD: 0.4 K/UL (ref 0.8–3.5)
LYMPHOCYTES NFR BLD: 15 % (ref 12–49)
M PNEUMO DNA SPEC QL NAA+PROBE: NOT DETECTED
MCH RBC QN AUTO: 19.3 PG (ref 26–34)
MCHC RBC AUTO-ENTMCNC: 28.4 G/DL (ref 30–36.5)
MCV RBC AUTO: 68 FL (ref 80–99)
MONOCYTES # BLD: 0.2 K/UL (ref 0–1)
MONOCYTES NFR BLD: 8 % (ref 5–13)
MUCOUS THREADS URNS QL MICRO: ABNORMAL /LPF
NEUTS SEG # BLD: 2.3 K/UL (ref 1.8–8)
NEUTS SEG NFR BLD: 77 % (ref 32–75)
NITRITE UR QL STRIP.AUTO: NEGATIVE
NRBC # BLD: 0 K/UL (ref 0–0.01)
NRBC BLD-RTO: 0 PER 100 WBC
P-R INTERVAL, ECG05: 132 MS
PH GAST: 3 [PH] (ref 1.5–3.5)
PH UR STRIP: 6 [PH] (ref 5–8)
PLATELET # BLD AUTO: 160 K/UL (ref 150–400)
POTASSIUM SERPL-SCNC: 3.6 MMOL/L (ref 3.5–5.1)
PROT SERPL-MCNC: 7.9 G/DL (ref 6.4–8.2)
PROT UR STRIP-MCNC: ABNORMAL MG/DL
Q-T INTERVAL, ECG07: 370 MS
QRS DURATION, ECG06: 110 MS
QTC CALCULATION (BEZET), ECG08: 452 MS
RBC # BLD AUTO: 4.4 M/UL (ref 4.1–5.7)
RBC #/AREA URNS HPF: ABNORMAL /HPF (ref 0–5)
RBC MORPH BLD: ABNORMAL
RSV RNA SPEC QL NAA+PROBE: NOT DETECTED
RV+EV RNA SPEC QL NAA+PROBE: NOT DETECTED
SAMPLES BEING HELD,HOLD: NORMAL
SAMPLES BEING HELD,HOLD: NORMAL
SODIUM SERPL-SCNC: 132 MMOL/L (ref 136–145)
SP GR UR REFRACTOMETRY: 1.03 (ref 1–1.03)
UR CULT HOLD, URHOLD: NORMAL
UROBILINOGEN UR QL STRIP.AUTO: 0.2 EU/DL (ref 0.2–1)
VENTRICULAR RATE, ECG03: 90 BPM
WBC # BLD AUTO: 2.9 K/UL (ref 4.1–11.1)
WBC URNS QL MICRO: ABNORMAL /HPF (ref 0–4)

## 2020-01-29 PROCEDURE — 93005 ELECTROCARDIOGRAM TRACING: CPT

## 2020-01-29 PROCEDURE — 71045 X-RAY EXAM CHEST 1 VIEW: CPT

## 2020-01-29 PROCEDURE — 65610000006 HC RM INTENSIVE CARE

## 2020-01-29 PROCEDURE — 96375 TX/PRO/DX INJ NEW DRUG ADDON: CPT

## 2020-01-29 PROCEDURE — 74011250636 HC RX REV CODE- 250/636

## 2020-01-29 PROCEDURE — 74011250636 HC RX REV CODE- 250/636: Performed by: EMERGENCY MEDICINE

## 2020-01-29 PROCEDURE — 76937 US GUIDE VASCULAR ACCESS: CPT

## 2020-01-29 PROCEDURE — 74011000258 HC RX REV CODE- 258: Performed by: EMERGENCY MEDICINE

## 2020-01-29 PROCEDURE — 74011250637 HC RX REV CODE- 250/637: Performed by: INTERNAL MEDICINE

## 2020-01-29 PROCEDURE — 36415 COLL VENOUS BLD VENIPUNCTURE: CPT

## 2020-01-29 PROCEDURE — 82271 OCCULT BLOOD OTHER SOURCES: CPT

## 2020-01-29 PROCEDURE — 81001 URINALYSIS AUTO W/SCOPE: CPT

## 2020-01-29 PROCEDURE — C9113 INJ PANTOPRAZOLE SODIUM, VIA: HCPCS | Performed by: INTERNAL MEDICINE

## 2020-01-29 PROCEDURE — C9113 INJ PANTOPRAZOLE SODIUM, VIA: HCPCS | Performed by: EMERGENCY MEDICINE

## 2020-01-29 PROCEDURE — 94762 N-INVAS EAR/PLS OXIMTRY CONT: CPT

## 2020-01-29 PROCEDURE — 87804 INFLUENZA ASSAY W/OPTIC: CPT

## 2020-01-29 PROCEDURE — 74011250637 HC RX REV CODE- 250/637: Performed by: EMERGENCY MEDICINE

## 2020-01-29 PROCEDURE — 83690 ASSAY OF LIPASE: CPT

## 2020-01-29 PROCEDURE — 80235 DRUG ASSAY LACOSAMIDE: CPT

## 2020-01-29 PROCEDURE — 96374 THER/PROPH/DIAG INJ IV PUSH: CPT

## 2020-01-29 PROCEDURE — 02HV33Z INSERTION OF INFUSION DEVICE INTO SUPERIOR VENA CAVA, PERCUTANEOUS APPROACH: ICD-10-PCS | Performed by: INTERNAL MEDICINE

## 2020-01-29 PROCEDURE — 77030019905 HC CATH URETH INTMIT MDII -A

## 2020-01-29 PROCEDURE — 85025 COMPLETE CBC W/AUTO DIFF WBC: CPT

## 2020-01-29 PROCEDURE — 74011000250 HC RX REV CODE- 250: Performed by: INTERNAL MEDICINE

## 2020-01-29 PROCEDURE — 83605 ASSAY OF LACTIC ACID: CPT

## 2020-01-29 PROCEDURE — 0100U RESPIRATORY PANEL,PCR,NASOPHARYNGEAL: CPT

## 2020-01-29 PROCEDURE — 77030018786 HC NDL GD F/USND BARD -B

## 2020-01-29 PROCEDURE — C1751 CATH, INF, PER/CENT/MIDLINE: HCPCS

## 2020-01-29 PROCEDURE — C9254 INJECTION, LACOSAMIDE: HCPCS | Performed by: EMERGENCY MEDICINE

## 2020-01-29 PROCEDURE — 36573 INSJ PICC RS&I 5 YR+: CPT | Performed by: EMERGENCY MEDICINE

## 2020-01-29 PROCEDURE — 51701 INSERT BLADDER CATHETER: CPT

## 2020-01-29 PROCEDURE — 87040 BLOOD CULTURE FOR BACTERIA: CPT

## 2020-01-29 PROCEDURE — 74011250636 HC RX REV CODE- 250/636: Performed by: INTERNAL MEDICINE

## 2020-01-29 PROCEDURE — 80053 COMPREHEN METABOLIC PANEL: CPT

## 2020-01-29 PROCEDURE — 74011000250 HC RX REV CODE- 250: Performed by: EMERGENCY MEDICINE

## 2020-01-29 PROCEDURE — 74011000258 HC RX REV CODE- 258: Performed by: INTERNAL MEDICINE

## 2020-01-29 PROCEDURE — 99285 EMERGENCY DEPT VISIT HI MDM: CPT

## 2020-01-29 RX ORDER — LORAZEPAM 2 MG/ML
INJECTION INTRAMUSCULAR
Status: DISPENSED
Start: 2020-01-29 | End: 2020-01-29

## 2020-01-29 RX ORDER — DIAZEPAM 5 MG/1
2.5 TABLET ORAL
Status: DISCONTINUED | OUTPATIENT
Start: 2020-01-29 | End: 2020-02-06 | Stop reason: HOSPADM

## 2020-01-29 RX ORDER — LORAZEPAM 2 MG/ML
1 INJECTION INTRAMUSCULAR
Status: COMPLETED | OUTPATIENT
Start: 2020-01-29 | End: 2020-01-29

## 2020-01-29 RX ORDER — LEVOFLOXACIN 5 MG/ML
750 INJECTION, SOLUTION INTRAVENOUS EVERY 24 HOURS
Status: DISCONTINUED | OUTPATIENT
Start: 2020-01-29 | End: 2020-02-01

## 2020-01-29 RX ORDER — BROMOCRIPTINE MESYLATE 2.5 MG/1
5 TABLET ORAL 4 TIMES DAILY
Status: DISCONTINUED | OUTPATIENT
Start: 2020-01-29 | End: 2020-02-06 | Stop reason: HOSPADM

## 2020-01-29 RX ORDER — DOCUSATE SODIUM 100 MG/1
100 CAPSULE, LIQUID FILLED ORAL
Status: DISCONTINUED | OUTPATIENT
Start: 2020-01-29 | End: 2020-01-31 | Stop reason: SDUPTHER

## 2020-01-29 RX ORDER — SODIUM CHLORIDE 0.9 % (FLUSH) 0.9 %
5-10 SYRINGE (ML) INJECTION AS NEEDED
Status: DISCONTINUED | OUTPATIENT
Start: 2020-01-29 | End: 2020-02-06 | Stop reason: HOSPADM

## 2020-01-29 RX ORDER — SODIUM CHLORIDE 9 MG/ML
75 INJECTION, SOLUTION INTRAVENOUS CONTINUOUS
Status: DISCONTINUED | OUTPATIENT
Start: 2020-01-29 | End: 2020-02-06 | Stop reason: HOSPADM

## 2020-01-29 RX ORDER — ONDANSETRON 2 MG/ML
4 INJECTION INTRAMUSCULAR; INTRAVENOUS
Status: COMPLETED | OUTPATIENT
Start: 2020-01-29 | End: 2020-01-29

## 2020-01-29 RX ORDER — DANTROLENE SODIUM 25 MG/1
100 CAPSULE ORAL 4 TIMES DAILY
Status: DISCONTINUED | OUTPATIENT
Start: 2020-01-29 | End: 2020-02-06 | Stop reason: HOSPADM

## 2020-01-29 RX ORDER — ACETAMINOPHEN 650 MG/1
650 SUPPOSITORY RECTAL
Status: COMPLETED | OUTPATIENT
Start: 2020-01-29 | End: 2020-01-29

## 2020-01-29 RX ORDER — SODIUM CHLORIDE 0.9 % (FLUSH) 0.9 %
5-40 SYRINGE (ML) INJECTION EVERY 8 HOURS
Status: DISCONTINUED | OUTPATIENT
Start: 2020-01-29 | End: 2020-02-06 | Stop reason: HOSPADM

## 2020-01-29 RX ORDER — PANTOPRAZOLE SODIUM 40 MG/10ML
40 INJECTION, POWDER, LYOPHILIZED, FOR SOLUTION INTRAVENOUS
Status: COMPLETED | OUTPATIENT
Start: 2020-01-29 | End: 2020-01-29

## 2020-01-29 RX ORDER — ENOXAPARIN SODIUM 100 MG/ML
40 INJECTION SUBCUTANEOUS EVERY 24 HOURS
Status: DISCONTINUED | OUTPATIENT
Start: 2020-01-29 | End: 2020-01-30

## 2020-01-29 RX ORDER — KETOROLAC TROMETHAMINE 30 MG/ML
30 INJECTION, SOLUTION INTRAMUSCULAR; INTRAVENOUS
Status: COMPLETED | OUTPATIENT
Start: 2020-01-29 | End: 2020-01-29

## 2020-01-29 RX ORDER — SODIUM CHLORIDE 0.9 % (FLUSH) 0.9 %
5-40 SYRINGE (ML) INJECTION AS NEEDED
Status: DISCONTINUED | OUTPATIENT
Start: 2020-01-29 | End: 2020-02-06 | Stop reason: HOSPADM

## 2020-01-29 RX ORDER — TRAZODONE HYDROCHLORIDE 100 MG/1
100 TABLET ORAL
COMMUNITY

## 2020-01-29 RX ORDER — LACOSAMIDE 100 MG/1
200 TABLET ORAL 2 TIMES DAILY
Status: DISCONTINUED | OUTPATIENT
Start: 2020-01-29 | End: 2020-02-06 | Stop reason: HOSPADM

## 2020-01-29 RX ORDER — LACOSAMIDE 10 MG/ML
200 INJECTION, SOLUTION INTRAVENOUS
Status: DISCONTINUED | OUTPATIENT
Start: 2020-01-29 | End: 2020-01-29

## 2020-01-29 RX ORDER — LORAZEPAM 2 MG/ML
INJECTION INTRAMUSCULAR
Status: COMPLETED
Start: 2020-01-29 | End: 2020-01-29

## 2020-01-29 RX ORDER — TRAZODONE HYDROCHLORIDE 50 MG/1
100 TABLET ORAL
Status: DISCONTINUED | OUTPATIENT
Start: 2020-01-29 | End: 2020-02-06 | Stop reason: HOSPADM

## 2020-01-29 RX ADMIN — CEFEPIME HYDROCHLORIDE 2 G: 2 INJECTION, POWDER, FOR SOLUTION INTRAVENOUS at 22:10

## 2020-01-29 RX ADMIN — PANTOPRAZOLE SODIUM 40 MG: 40 INJECTION, POWDER, FOR SOLUTION INTRAVENOUS at 20:55

## 2020-01-29 RX ADMIN — DANTROLENE SODIUM 100 MG: 25 CAPSULE ORAL at 20:55

## 2020-01-29 RX ADMIN — DANTROLENE SODIUM 100 MG: 25 CAPSULE ORAL at 18:10

## 2020-01-29 RX ADMIN — SODIUM CHLORIDE 728 ML: 900 INJECTION, SOLUTION INTRAVENOUS at 08:00

## 2020-01-29 RX ADMIN — SODIUM CHLORIDE 1000 ML: 900 INJECTION, SOLUTION INTRAVENOUS at 05:58

## 2020-01-29 RX ADMIN — DANTROLENE SODIUM 100 MG: 25 CAPSULE ORAL at 15:03

## 2020-01-29 RX ADMIN — ACETAMINOPHEN 650 MG: 650 SUPPOSITORY RECTAL at 05:38

## 2020-01-29 RX ADMIN — NOREPINEPHRINE BITARTRATE 0.5 MCG/MIN: 1 INJECTION, SOLUTION, CONCENTRATE INTRAVENOUS at 10:54

## 2020-01-29 RX ADMIN — Medication 10 ML: at 22:13

## 2020-01-29 RX ADMIN — Medication 10 ML: at 14:53

## 2020-01-29 RX ADMIN — Medication 10 ML: at 07:11

## 2020-01-29 RX ADMIN — LORAZEPAM 1 MG: 2 INJECTION INTRAMUSCULAR at 05:06

## 2020-01-29 RX ADMIN — SODIUM CHLORIDE 200 ML/HR: 900 INJECTION, SOLUTION INTRAVENOUS at 19:30

## 2020-01-29 RX ADMIN — WATER 2 G: 1 INJECTION INTRAMUSCULAR; INTRAVENOUS; SUBCUTANEOUS at 06:48

## 2020-01-29 RX ADMIN — SODIUM CHLORIDE 100 ML/HR: 900 INJECTION, SOLUTION INTRAVENOUS at 08:24

## 2020-01-29 RX ADMIN — TRAZODONE HYDROCHLORIDE 100 MG: 100 TABLET ORAL at 22:11

## 2020-01-29 RX ADMIN — BROMOCRIPTINE MESYLATE 5 MG: 2.5 TABLET ORAL at 15:03

## 2020-01-29 RX ADMIN — ONDANSETRON 4 MG: 2 INJECTION INTRAMUSCULAR; INTRAVENOUS at 07:11

## 2020-01-29 RX ADMIN — BROMOCRIPTINE MESYLATE 5 MG: 2.5 TABLET ORAL at 18:10

## 2020-01-29 RX ADMIN — LACOSAMIDE 200 MG: 100 TABLET, FILM COATED ORAL at 18:10

## 2020-01-29 RX ADMIN — SODIUM CHLORIDE 1000 ML: 900 INJECTION, SOLUTION INTRAVENOUS at 06:46

## 2020-01-29 RX ADMIN — NOREPINEPHRINE BITARTRATE 1 MCG/MIN: 1 INJECTION, SOLUTION, CONCENTRATE INTRAVENOUS at 11:30

## 2020-01-29 RX ADMIN — SODIUM CHLORIDE 200 ML/HR: 900 INJECTION, SOLUTION INTRAVENOUS at 14:38

## 2020-01-29 RX ADMIN — LEVOFLOXACIN 750 MG: 5 INJECTION, SOLUTION INTRAVENOUS at 06:57

## 2020-01-29 RX ADMIN — LORAZEPAM 1 MG: 2 INJECTION INTRAMUSCULAR; INTRAVENOUS at 04:52

## 2020-01-29 RX ADMIN — WATER 2 G: 1 INJECTION INTRAMUSCULAR; INTRAVENOUS; SUBCUTANEOUS at 14:49

## 2020-01-29 RX ADMIN — LORAZEPAM 1 MG: 2 INJECTION INTRAMUSCULAR; INTRAVENOUS at 05:06

## 2020-01-29 RX ADMIN — BROMOCRIPTINE MESYLATE 5 MG: 2.5 TABLET ORAL at 20:55

## 2020-01-29 RX ADMIN — PANTOPRAZOLE SODIUM 40 MG: 40 INJECTION, POWDER, FOR SOLUTION INTRAVENOUS at 07:13

## 2020-01-29 RX ADMIN — SODIUM CHLORIDE 200 MG: 9 INJECTION, SOLUTION INTRAVENOUS at 05:27

## 2020-01-29 RX ADMIN — KETOROLAC TROMETHAMINE 30 MG: 30 INJECTION, SOLUTION INTRAMUSCULAR; INTRAVENOUS at 05:44

## 2020-01-29 NOTE — ED PROVIDER NOTES
Per caregiver and father, pt. Has had fever with dark urine recently, pt. Also had seizure tonight lasting approximately 10 minutes. Pt was seen a couple weeks ago/admission. The history is provided by a parent and a caregiver. No  was used. Fever    This is a recurrent problem. The current episode started 1 to 2 hours ago. The problem has been resolved. The maximum temperature noted was 102 - 102.9 F. The temperature was taken using an oral thermometer. Pertinent negatives include no rash. He has tried acetaminophen for the symptoms. Past Medical History:   Diagnosis Date    Anemia 7/28/2019    Neurological disorder     traumatic brain injury    [facility]     [facility]        Past Surgical History:   Procedure Laterality Date    HX CRANIOTOMY           Family History:   Problem Relation Age of Onset    No Known Problems Other         reviewed.   Patient does not know       Social History     Socioeconomic History    Marital status: SINGLE     Spouse name: Not on file    Number of children: Not on file    Years of education: Not on file    Highest education level: Not on file   Occupational History    Not on file   Social Needs    Financial resource strain: Not on file    Food insecurity:     Worry: Not on file     Inability: Not on file    Transportation needs:     Medical: Not on file     Non-medical: Not on file   Tobacco Use    Smoking status: Never Smoker    Smokeless tobacco: Never Used   Substance and Sexual Activity    Alcohol use: No    Drug use: No    Sexual activity: Not on file   Lifestyle    Physical activity:     Days per week: Not on file     Minutes per session: Not on file    Stress: Not on file   Relationships    Social connections:     Talks on phone: Not on file     Gets together: Not on file     Attends [employer] service: Not on file     Active member of club or organization: Not on file     Attends meetings of clubs or organizations: Not on file     Relationship status: Not on file    Intimate partner violence:     Fear of current or ex partner: Not on file     Emotionally abused: Not on file     Physically abused: Not on file     Forced sexual activity: Not on file   Other Topics Concern    Not on file   Social History Narrative    Not on file     ALLERGIES: Patient has no known allergies. Review of Systems   Unable to perform ROS: Patient nonverbal   Constitutional: Positive for fever. Genitourinary: Positive for frequency. Skin: Negative for rash. Neurological: Positive for seizures. All other systems reviewed and are negative. Vitals:    01/29/20 0615 01/29/20 0630 01/29/20 0645 01/29/20 0700   BP: 98/68 114/71 114/71 106/66   Pulse: (!) 106 (!) 106 96 88   Resp: 28 22 18 17   Temp:       SpO2: 95% 93% 96% 95%   Weight:                Physical Exam  Vitals signs and nursing note reviewed. Constitutional:       General: He is not in acute distress. Appearance: He is well-developed. He is ill-appearing. He is not diaphoretic. Comments: Chronically ill   HENT:      Head:      Comments: Obvious prior injury to the right side of skull. Right Ear: External ear normal.      Left Ear: External ear normal.      Nose: Nose normal.      Mouth/Throat:      Pharynx: No oropharyngeal exudate. Eyes:      General: No scleral icterus. Right eye: No discharge. Left eye: No discharge. Conjunctiva/sclera: Conjunctivae normal.      Pupils: Pupils are equal, round, and reactive to light. Neck:      Musculoskeletal: Normal range of motion and neck supple. Vascular: No JVD. Trachea: No tracheal deviation. Cardiovascular:      Rate and Rhythm: Regular rhythm. Tachycardia present. Heart sounds: Normal heart sounds. No murmur. No friction rub. No gallop. Pulmonary:      Effort: Pulmonary effort is normal. No respiratory distress. Breath sounds: No stridor. Examination of the right-lower field reveals rhonchi. Rhonchi present. No decreased breath sounds, wheezing or rales. Chest:      Chest wall: No tenderness. Abdominal:      General: Bowel sounds are normal. There is no distension. Palpations: Abdomen is soft. Tenderness: There is no abdominal tenderness. There is no guarding or rebound. Musculoskeletal: Normal range of motion. General: No tenderness. Skin:     General: Skin is warm and dry. Capillary Refill: Capillary refill takes 2 to 3 seconds. Coloration: Skin is not pale. Findings: No erythema or rash. Neurological:      Mental Status: He is alert. GCS: GCS eye subscore is 4. GCS verbal subscore is 5. GCS motor subscore is 6. Motor: No abnormal muscle tone. Deep Tendon Reflexes: Reflexes are normal and symmetric. Comments: Patient does move extremities to verbal command. However, there significant amount of contractures of the upper and lower extremities.   Patient is essentially nonverbal.          MDM  Number of Diagnoses or Management Options  Chronic anemia:   Fever, unspecified fever cause:   Hematemesis with nausea:   Leukopenia, unspecified type:   Pleural effusion on right:   Seizure disorder [facility]):      Amount and/or Complexity of Data Reviewed  Clinical lab tests: ordered and reviewed  Tests in the radiology section of CPT®: ordered and reviewed  Tests in the medicine section of CPT®: ordered and reviewed  Discuss the patient with other providers: yes (Hospitalist)  Independent visualization of images, tracings, or specimens: yes (EKG)    Risk of Complications, Morbidity, and/or Mortality  Presenting problems: moderate  Diagnostic procedures: moderate  Management options: moderate    Critical Care  Total time providing critical care: 30-74 minutes (Total critical care time spent exclusive of procedures: 40 minutes)    Patient Progress  Patient progress: stable       Procedures    Chief Complaint   Patient presents with    Fever    Seizure    Urinary Frequency       The patient's presenting problems have been discussed, and they are in agreement with the care plan formulated and outlined with them. I have encouraged them to ask questions as they arise throughout their visit.     MEDICATIONS GIVEN:  Medications   sodium chloride (NS) flush 5-40 mL (10 mL IntraVENous Given 1/29/20 0711)   sodium chloride (NS) flush 5-40 mL (has no administration in time range)   LORazepam (ATIVAN) 2 mg/mL injection (  Canceled Entry 1/29/20 0508)   sodium chloride (NS) flush 5-10 mL (has no administration in time range)   sodium chloride 0.9 % bolus infusion 1,000 mL (1,000 mL IntraVENous New Bag 1/29/20 0646)     Followed by   sodium chloride 0.9 % bolus infusion 728 mL (has no administration in time range)   cefepime (MAXIPIME) 2 g in sterile water (preservative free) 10 mL IV syringe (2 g IntraVENous Given 1/29/20 0648)   levoFLOXacin (LEVAQUIN) 750 mg in D5W IVPB (750 mg IntraVENous New Bag 1/29/20 0657)   pantoprazole (PROTONIX) injection 40 mg (has no administration in time range)   sodium chloride 0.9 % bolus infusion 1,000 mL (1,000 mL IntraVENous New Bag 1/29/20 0558)   ketorolac (TORADOL) injection 30 mg (30 mg IntraVENous Given 1/29/20 0544)   LORazepam (ATIVAN) injection 1 mg (1 mg IntraVENous Given 1/29/20 0452)   acetaminophen (TYLENOL) suppository 650 mg (650 mg Rectal Given 1/29/20 0538)   LORazepam (ATIVAN) injection 1 mg (1 mg IntraVENous Given 1/29/20 0506)   lacosamide (VIMPAT) 200 mg in 0.9% sodium chloride 100 mL IVPB (200 mg IntraVENous Given 1/29/20 0527)   ondansetron (ZOFRAN) injection 4 mg (4 mg IntraVENous Given 1/29/20 0711)       LABS REVIEWED:  Recent Results (from the past 24 hour(s))   SAMPLES BEING HELD    Collection Time: 01/29/20  4:53 AM   Result Value Ref Range    SAMPLES BEING HELD 1RED,1BLU,1DRKGRN     COMMENT        Add-on orders for these samples will be processed based on acceptable specimen integrity and analyte stability, which may vary by analyte. METABOLIC PANEL, COMPREHENSIVE    Collection Time: 01/29/20  4:53 AM   Result Value Ref Range    Sodium 132 (L) 136 - 145 mmol/L    Potassium 3.6 3.5 - 5.1 mmol/L    Chloride 99 97 - 108 mmol/L    CO2 26 21 - 32 mmol/L    Anion gap 7 5 - 15 mmol/L    Glucose 110 (H) 65 - 100 mg/dL    BUN 15 6 - 20 MG/DL    Creatinine 0.63 (L) 0.70 - 1.30 MG/DL    BUN/Creatinine ratio 24 (H) 12 - 20      GFR est AA >60 >60 ml/min/1.73m2    GFR est non-AA >60 >60 ml/min/1.73m2    Calcium 8.0 (L) 8.5 - 10.1 MG/DL    Bilirubin, total 0.2 0.2 - 1.0 MG/DL    ALT (SGPT) 19 12 - 78 U/L    AST (SGOT) 25 15 - 37 U/L    Alk.  phosphatase 61 45 - 117 U/L    Protein, total 7.9 6.4 - 8.2 g/dL    Albumin 2.9 (L) 3.5 - 5.0 g/dL    Globulin 5.0 (H) 2.0 - 4.0 g/dL    A-G Ratio 0.6 (L) 1.1 - 2.2     LACTIC ACID    Collection Time: 01/29/20  4:53 AM   Result Value Ref Range    Lactic acid 1.2 0.4 - 2.0 MMOL/L   LIPASE    Collection Time: 01/29/20  4:53 AM   Result Value Ref Range    Lipase 154 73 - 393 U/L   INFLUENZA A & B AG (RAPID TEST)    Collection Time: 01/29/20  5:10 AM   Result Value Ref Range    Influenza A Antigen NEGATIVE  NEG      Influenza B Antigen NEGATIVE  NEG     URINALYSIS W/MICROSCOPIC    Collection Time: 01/29/20  5:49 AM   Result Value Ref Range    Color YELLOW/STRAW      Appearance CLOUDY (A) CLEAR      Specific gravity 1.026 1.003 - 1.030      pH (UA) 6.0 5.0 - 8.0      Protein TRACE (A) NEG mg/dL    Glucose NEGATIVE  NEG mg/dL    Ketone NEGATIVE  NEG mg/dL    Bilirubin NEGATIVE  NEG      Blood NEGATIVE  NEG      Urobilinogen 0.2 0.2 - 1.0 EU/dL    Nitrites NEGATIVE  NEG      Leukocyte Esterase NEGATIVE  NEG      WBC 0-4 0 - 4 /hpf    RBC 0-5 0 - 5 /hpf    Epithelial cells FEW FEW /lpf    Bacteria NEGATIVE  NEG /hpf    Mucus 1+ (A) NEG /lpf    Amorphous Crystals FEW (A) NEG     URINE CULTURE HOLD SAMPLE    Collection Time: 01/29/20  5:49 AM   Result Value Ref Range    Urine culture hold        URINE ON HOLD IN MICROBIOLOGY DEPT FOR 3 DAYS. IF UNPRESERVED URINE IS SUBMITTED, IT CANNOT BE USED FOR ADDITIONAL TESTING AFTER 24 HRS, RECOLLECTION WILL BE REQUIRED. CBC WITH AUTOMATED DIFF    Collection Time: 01/29/20  5:50 AM   Result Value Ref Range    WBC 2.9 (L) 4.1 - 11.1 K/uL    RBC 4.40 4. 10 - 5.70 M/uL    HGB 8.5 (L) 12.1 - 17.0 g/dL    HCT 29.9 (L) 36.6 - 50.3 %    MCV 68.0 (L) 80.0 - 99.0 FL    MCH 19.3 (L) 26.0 - 34.0 PG    MCHC 28.4 (L) 30.0 - 36.5 g/dL    RDW 19.0 (H) 11.5 - 14.5 %    PLATELET 291 956 - 243 K/uL    NRBC 0.0 0  WBC    ABSOLUTE NRBC 0.00 0.00 - 0.01 K/uL    NEUTROPHILS 77 (H) 32 - 75 %    LYMPHOCYTES 15 12 - 49 %    MONOCYTES 8 5 - 13 %    EOSINOPHILS 0 0 - 7 %    BASOPHILS 0 0 - 1 %    IMMATURE GRANULOCYTES 0 0.0 - 0.5 %    ABS. NEUTROPHILS 2.3 1.8 - 8.0 K/UL    ABS. LYMPHOCYTES 0.4 (L) 0.8 - 3.5 K/UL    ABS. MONOCYTES 0.2 0.0 - 1.0 K/UL    ABS. EOSINOPHILS 0.0 0.0 - 0.4 K/UL    ABS. BASOPHILS 0.0 0.0 - 0.1 K/UL    ABS. IMM. GRANS. 0.0 0.00 - 0.04 K/UL    DF SMEAR SCANNED      RBC COMMENTS ANISOCYTOSIS  1+        RBC COMMENTS HYPOCHROMIA  1+        RBC COMMENTS MICROCYTOSIS  1+       SAMPLES BEING HELD    Collection Time: 01/29/20  5:50 AM   Result Value Ref Range    SAMPLES BEING HELD JAYLA     COMMENT        Add-on orders for these samples will be processed based on acceptable specimen integrity and analyte stability, which may vary by analyte.        VITAL SIGNS:  Patient Vitals for the past 24 hrs:   Temp Pulse Resp BP SpO2   01/29/20 0700  88 17 106/66 95 %   01/29/20 0645  96 18 114/71 96 %   01/29/20 0630  (!) 106 22 114/71 93 %   01/29/20 0615  (!) 106 28 98/68 95 %   01/29/20 0600  (!) 103 19 93/62 94 %   01/29/20 0545  (!) 106 18 94/64 94 %   01/29/20 0530  (!) 127 19 102/67 96 %   01/29/20 0515  (!) 131 25 109/79 100 %   01/29/20 0500  (!) 118 25 120/70 100 %   01/29/20 0430  95 19 97/64 97 %   01/29/20 0421 (!) 100.8 °F (38.2 °C) (!) 105 20 100/66 98 %       RADIOLOGY RESULTS:  The following have been ordered and reviewed:  Xr Chest Port    Result Date: 1/29/2020  INDICATION: pneumonia EXAM:  AP CHEST RADIOGRAPH COMPARISON: December 23, 2019 FINDINGS: AP portable view of the chest demonstrates cardiomegaly. Ventriculostomy shunt catheter tubing traverses the right chest. Small right pleural effusion and basilar atelectasis similar to prior study. No pneumothorax. The osseous structures are unremarkable. IMPRESSION: Small right pleural effusion and basilar atelectasis, unchanged. ED EKG interpretation:  Rhythm: normal sinus rhythm and incomplete right bundle branch block; and regular . Rate (approx.): 90; Axis: normal; P wave: normal; QRS interval: normal ; ST/T wave: normal; Other findings: abnormal ekg. This EKG was interpreted by Cedric Clifton DO,ED Provider. CONSULTATIONS:   CONSULT NOTE:  7:03 AM Maureen Dang DO spoke with Dr. Lei Crigler, Consult for Hospitalist.  Discussed available diagnostic tests and clinical findings. He is in agreement with care plans as outlined. Dr. Lei Crigler will see and admit pt for further evaluation and treatment. PROGRESS NOTES:  Patient did have 2 episodes of seizures while in the emergency room. Patient also had hematemesis. Discussed results and plan with father. Patient will be admitted/observed for further evaluation and treatment. DIAGNOSIS:    1. Seizure disorder ([address]    2. Fever, unspecified fever cause    3. Hematemesis with nausea    4. Leukopenia, unspecified type    5. Chronic anemia    6. Pleural effusion on right        PLAN:  Admit/obs    ED COURSE: The patient's hospital course has been uncomplicated.

## 2020-01-29 NOTE — PROGRESS NOTES
Patient admitted for possible GI bleed and/or sz. Not ready for swallow eval at this time. On last admission in [address] [facility] [address] 2019,he was on dysphagia 1, nectars. Caregiver was present and confirms that he was on this diet at home as well.   Will f/u in am.

## 2020-01-29 NOTE — ED TRIAGE NOTES
Per caregiver and father, pt. Has had fever with dark urine recently, pt. Also had seizure tonight. pt. was seen a couple weeks ago/admission.

## 2020-01-29 NOTE — PROGRESS NOTES
[address] [place] [address]. [address] [place] [address] NP  [phone]                    GASTROENTEROLOGY CONSULTATION NOTE              NAME:  Luis Carlos Barrera   :   1985   MRN:   [id number]       Referring Physician:   Dr. Steven West Date:   2020 2:50 PM    Chief Complaint:    GI Bleed     History of Present Illness:    Patient is a 30 y.o. who we have been asked to see in consultation for the above complaint. The patient has history of TBI and is non verbal to this has been obtained from his mother and father. The patient was brought to the hospital after a reported seizue at home. Associated symptoms have included coffee ground emesis and black stools at home. Upon arrival to the ER he had another 2 seizures which were witnessed. Pts mother reports he has a history of acid reflux. She states that his seizures in the past were prompted by infectious causes ie PNA. There has been no NSAID use. His work up here is notable for occult blood in gastric aspirates. Pts mother reports he has previous EGDs at the [facility].     PMH:  Past Medical History:   Diagnosis Date    Anemia 2019    History of vascular access device 2020    5F DOUBLE LUMEN PICC PLACEC BY ROSA MCGARRY RN R BRACHIAL 38 CM, NO DIFFICULTY    Neurological disorder     traumatic brain injury    Seizures ([address]     TBI (traumatic brain injury) ([address]        PSH:  Past Surgical History:   Procedure Laterality Date    HX CRANIOTOMY         Allergies:  No Known Allergies    Home Medications:  Prior to Admission Medications   Prescriptions Last Dose Informant Patient Reported? Taking? Lactobacillus acidophilus (ACIDOPHILUS) cap 2020 at Unknown time Parent Yes Yes   Sig: Take 1 Cap by mouth daily. Before morning turn   aspirin-acetaminophen-caffeine (EXCEDRIN ES) 250-250-65 mg per tablet  Parent Yes Yes   Sig: Take 2 Tabs by mouth every six (6) hours as needed for Pain or Headache.  Do not use with ibuprofen bisacodyl (DULCOLAX) 10 mg suppository  Parent Yes Yes   Sig: Insert 10 mg into rectum daily as needed (constipation). bromocriptine mesylate (BROMOCRIPTINE PO) 1/28/2020 at Unknown time Parent Yes Yes   Sig: Take 5 mg by mouth four (4) times daily. Patient takes at 8am, 2pm, 6pm, 8pm   cholecalciferol, vitamin D3, (VITAMIN D3) 2,000 unit tab 1/28/2020 at Unknown time Parent Yes Yes   Sig: Take 2,000 Units by mouth daily. dantrolene (DANTRIUM) 100 mg capsule 1/28/2020 at Unknown time Parent Yes Yes   Sig: Take 100 mg by mouth four (4) times daily. Patient takes at 8am, 2pm, 6pm, 8pm   diazePAM (VALIUM) 5 mg tablet  Parent No Yes   Sig: Take 0.5 Tabs by mouth every eight (8) hours as needed (for excssive spasticity or seizure). Max Daily Amount: 7.5 mg.   docusate sodium 100 mg tab 1/28/2020 at Unknown time Parent Yes Yes   Sig: Take 100 mg by mouth nightly. ibuprofen (MOTRIN) 600 mg tablet  Parent Yes Yes   Sig: Take 600 mg by mouth every four (4) hours as needed for Pain. lacosamide (VIMPAT) 200 mg tab tablet 1/28/2020 at Unknown time Parent No Yes   Sig: Take 1 Tab by mouth two (2) times a day. Max Daily Amount: 400 mg.   omega 3-DHA-EPA-fish oil 1,000 mg (120 mg-180 mg) capsule 1/28/2020 at Unknown time Parent Yes Yes   Sig: Take 1 Cap by mouth every evening. pantoprazole (PROTONIX) 40 mg tablet 1/28/2020 at Unknown time Parent Yes Yes   Sig: Take 40 mg by mouth daily. Before morning turn   raNITIdine (ZANTAC) 150 mg tablet  Parent Yes Yes   Sig: Take 150 mg by mouth two (2) times daily as needed for Indigestion. senna (SENNA) 8.6 mg tablet 1/28/2020 at Unknown time Parent Yes Yes   Sig: Take 2 Tabs by mouth nightly. traZODone (DESYREL) 100 mg tablet 1/28/2020 at Unknown time Parent Yes Yes   Sig: Take 100 mg by mouth nightly.       Facility-Administered Medications: None       Hospital Medications:  Current Facility-Administered Medications   Medication Dose Route Frequency    sodium chloride (NS) flush 5-40 mL  5-40 mL IntraVENous Q8H    sodium chloride (NS) flush 5-40 mL  5-40 mL IntraVENous PRN    LORazepam (ATIVAN) 2 mg/mL injection        sodium chloride (NS) flush 5-10 mL  5-10 mL IntraVENous PRN    cefepime (MAXIPIME) 2 g in sterile water (preservative free) 10 mL IV syringe  2 g IntraVENous Q8H    levoFLOXacin (LEVAQUIN) 750 mg in D5W IVPB  750 mg IntraVENous Q24H    pantoprazole (PROTONIX) 40 mg in 0.9% sodium chloride 10 mL injection  40 mg IntraVENous Q12H    0.9% sodium chloride infusion  200 mL/hr IntraVENous CONTINUOUS    NOREPINephrine (LEVOPHED) 8,000 mcg in dextrose 5% 250 mL infusion  0.5-16 mcg/min IntraVENous TITRATE    enoxaparin (LOVENOX) injection 40 mg  40 mg SubCUTAneous Q24H    alteplase (CATHFLO) 1 mg in sterile water (preservative free) 1 mL injection  1 mg InterCATHeter PRN    bromocriptine (PARLODEL) tablet 5 mg  5 mg Oral QID    dantrolene (DANTRIUM) capsule 100 mg  100 mg Oral QID    diazePAM (VALIUM) tablet 2.5 mg  2.5 mg Oral Q8H PRN    docusate sodium (COLACE) capsule 100 mg  100 mg Oral QHS    lacosamide (VIMPAT) tablet 200 mg  200 mg Oral BID    traZODone (DESYREL) tablet 100 mg  100 mg Oral QHS       Social History:  Social History     Tobacco Use    Smoking status: Never Smoker    Smokeless tobacco: Never Used   Substance Use Topics    Alcohol use: No       Family History:  Family History   Problem Relation Age of Onset    No Known Problems Other         reviewed.   Patient does not know       Review of Systems:  Unable to obtain    Objective:     Patient Vitals for the past 8 hrs:   BP Temp Pulse Resp SpO2   01/29/20 1424   (!) 106     01/29/20 1345 107/71  95 17    01/29/20 1330 109/76  98 21 100 %   01/29/20 1300 103/71  87 19 100 %   01/29/20 1230 101/72  86 19 100 %   01/29/20 1145 91/60  79 17 100 %   01/29/20 1130 96/61  85 14 99 %   01/29/20 1115 (!) 89/53  79 13 100 %   01/29/20 1056 (!) 89/58  71  100 %   01/29/20 0955 93/57  71 14 100 %   01/29/20 0915 (!) 85/55  78 15 100 %   01/29/20 0900 95/55  90 23 100 %   01/29/20 0845 (!) 80/50  73 14    01/29/20 0815 (!) 82/52  75 14 100 %   01/29/20 0800 91/64  88 16 97 %   01/29/20 0748 (!) 86/55  82 15 98 %   01/29/20 0700 106/66 99.3 °F (37.4 °C) 88 17 95 %     01/29 0701 - 01/29 1900  In: -   Out: 550 [Urine:550]  No intake/output data recorded. EXAM:     NEURO-alert, affect appropriate   HEENT-Head: Normocephalic, no lesions, without obvious abnormality. LUNGS-clear to auscultation bilaterally    COR-regular rate and rhythym     ABD- soft, non-tender. Bowel sounds normal. No masses,  no organomegaly     EXT-no edema    Skin - No rash     Data Review     Recent Labs     01/29/20  0550   WBC 2.9*   HGB 8.5*   HCT 29.9*        Recent Labs     01/29/20  0453   *   K 3.6   CL 99   CO2 26   BUN 15   CREA 0.63*   *   CA 8.0*     Recent Labs     01/29/20  0453   SGOT 25   AP 61   TP 7.9   ALB 2.9*   GLOB 5.0*   LPSE 154     No results for input(s): INR, PTP, APTT, INREXT in the last 72 hours. Patient Active Problem List   Diagnosis Code    Complicated UTI (urinary tract infection) N39.0    TBI (traumatic brain injury) ([address] S06. 9X9A    Anemia D64.9    Renal insufficiency N28.9    Seizure (HCC) R56.9    GI bleed K92.2    SIRS (systemic inflammatory response syndrome) ([facility]) R65.10       Assessment and Plan:  GI Bleeding:  Concerning for Esophagitis, PUD, Carito rutherford tear. - NPO  - IV PPI BID  - Monitor hemoglobin and transfuse to goal of 7.  - No NSAIDs.  - No plans for urgent endoscopy unless he begins actively bleeding. Will continue to follow. Thanks for allowing me to participate in the care of this patient.   Signed By: Emmy Montiel NP     1/29/2020  2:50 PM

## 2020-01-29 NOTE — ED NOTES
Called Dr. Jose Anaya concerning pts BP, would like to have ED MD to place central line, informed Dr. Marianna Nick, he will call Dr. Jose Anaya

## 2020-01-29 NOTE — PROGRESS NOTES
Admission Medication Reconciliation:     Information obtained from:    Family member present. Mother via phone. Pill bottles which family will take to the car  RxQuery data available¹:  YES    Comments/Recommendations:   Updated PTA medication list  Verified preferred pharmacy  Reviewed patient's allergies     ¹RxQuery pharmacy benefit data reflects medications filled and processed through the patient's insurance, however   this data does NOT capture whether the medication was picked up or is currently being taken by the patient. Prior to Admission Medications   Prescriptions Last Dose Informant Taking? Lactobacillus acidophilus (ACIDOPHILUS) cap 1/28/2020 at Unknown time Parent Yes   Sig: Take 1 Cap by mouth daily. Before morning turn   aspirin-acetaminophen-caffeine (EXCEDRIN ES) 250-250-65 mg per tablet  Parent Yes   Sig: Take 2 Tabs by mouth every six (6) hours as needed for Pain or Headache. Do not use with ibuprofen   bisacodyl (DULCOLAX) 10 mg suppository  Parent Yes   Sig: Insert 10 mg into rectum daily as needed (constipation). bromocriptine mesylate (BROMOCRIPTINE PO) 1/28/2020 at Unknown time Parent Yes   Sig: Take 5 mg by mouth four (4) times daily. Patient takes at 8am, 2pm, 6pm, 8pm   cholecalciferol, vitamin D3, (VITAMIN D3) 2,000 unit tab 1/28/2020 at Unknown time Parent Yes   Sig: Take 2,000 Units by mouth daily. dantrolene (DANTRIUM) 100 mg capsule 1/28/2020 at Unknown time Parent Yes   Sig: Take 100 mg by mouth four (4) times daily. Patient takes at 8am, 2pm, 6pm, 8pm   diazePAM (VALIUM) 5 mg tablet  Parent Yes   Sig: Take 0.5 Tabs by mouth every eight (8) hours as needed (for excssive spasticity or seizure). Max Daily Amount: 7.5 mg.   docusate sodium 100 mg tab 1/28/2020 at Unknown time Parent Yes   Sig: Take 100 mg by mouth nightly. ibuprofen (MOTRIN) 600 mg tablet  Parent Yes   Sig: Take 600 mg by mouth every four (4) hours as needed for Pain.    lacosamide (VIMPAT) 200 mg tab tablet 1/28/2020 at Unknown time Parent Yes   Sig: Take 1 Tab by mouth two (2) times a day. Max Daily Amount: 400 mg.   omega 3-DHA-EPA-fish oil 1,000 mg (120 mg-180 mg) capsule 1/28/2020 at Unknown time Parent Yes   Sig: Take 1 Cap by mouth every evening. pantoprazole (PROTONIX) 40 mg tablet 1/28/2020 at Unknown time Parent Yes   Sig: Take 40 mg by mouth daily. Before morning turn   raNITIdine (ZANTAC) 150 mg tablet  Parent Yes   Sig: Take 150 mg by mouth two (2) times daily as needed for Indigestion. senna (SENNA) 8.6 mg tablet 1/28/2020 at Unknown time Parent Yes   Sig: Take 2 Tabs by mouth nightly. traZODone (DESYREL) 100 mg tablet 1/28/2020 at Unknown time Parent Yes   Sig: Take 100 mg by mouth nightly. Facility-Administered Medications: None         Please contact the main inpatient pharmacy with any questions or concerns at [phone] and we will direct you to the clinical pharmacist covering this patient's care while in-house.    Cynthia Graham, PharmD, BCPS

## 2020-01-29 NOTE — ED TRIAGE NOTES
TRANSFER - OUT REPORT:    Verbal report given to MELVIN Galvin(name) on Danika Gordon  being transferred to ICU(unit) for routine progression of care       Report consisted of patients Situation, Background, Assessment and   Recommendations(SBAR). Information from the following report(s) SBAR, Kardex, ED Summary and MAR was reviewed with the receiving nurse. Lines:   PICC Double Lumen 01/29/20 Right;Brachial (Active)   Central Line Being Utilized Yes 1/29/2020 10:00 AM   Criteria for Appropriate Use Limited/no vessel suitable for conventional peripheral access 1/29/2020 10:00 AM   Site Assessment Clean, dry, & intact 1/29/2020 10:00 AM   Phlebitis Assessment 0 1/29/2020 10:00 AM   Infiltration Assessment 0 1/29/2020 10:00 AM   Arm Circumference (cm) 26 cm 1/29/2020 10:00 AM   Date of Last Dressing Change 01/29/20 1/29/2020 10:00 AM   Dressing Status Clean, dry, & intact 1/29/2020 10:00 AM   External Catheter Length (cm) 38 centimeters 1/29/2020 10:00 AM   Dressing Type Disk with Chlorhexadine gluconate (CHG); Transparent 1/29/2020 10:00 AM   Hub Color/Line Status Red 1/29/2020 10:00 AM   Positive Blood Return (Site #1) Yes 1/29/2020 10:00 AM   Hub Color/Line Status Purple 1/29/2020 10:00 AM   Positive Blood Return (Site #2) Yes 1/29/2020 10:00 AM   Alcohol Cap Used Yes 1/29/2020 10:00 AM       Peripheral IV 01/29/20 Right Hand (Active)   Site Assessment Clean, dry, & intact 1/29/2020  6:16 AM   Phlebitis Assessment 0 1/29/2020  6:16 AM   Infiltration Assessment 0 1/29/2020  6:16 AM   Dressing Status Clean, dry, & intact 1/29/2020  6:16 AM   Dressing Type Tape;Transparent 1/29/2020  6:16 AM   Hub Color/Line Status Blue;Flushed 1/29/2020  6:16 AM   Alcohol Cap Used Yes 1/29/2020  6:16 AM        Opportunity for questions and clarification was provided.       Patient transported with:   Monitor

## 2020-01-29 NOTE — PROGRESS NOTES
PICC Placement Note    PRE-PROCEDURE VERIFICATION  Correct Procedure: yes  Correct Site:  yes  Temperature: Temp: 99.3 °F (37.4 °C), Temperature Source: Temp Source: Rectal  Recent Labs     01/29/20  0550 01/29/20  0453   BUN  --  15   CREA  --  0.63*     --    WBC 2.9*  --      Allergies: Patient has no known allergies. Education materials for PICC Care given: yes. See Patient Education activity for further details. PICC Booklet placed at bedside: yes    Closed Ended PICC Catheters:  Flush Lumens as Follows:  Intermittent Medication:   Flush before and after each medication with 10 ml NS. Unused Ports:  Flush every 8 hours with 10 ml NS.  TPN Ports:  Flush every 24 hours with 20 ml NS prior to hanging new bag. Blood Draws: Stop infusion, draw off and waste 10 ml of blood. Draw sample with 10cc syringe or greater. DO NOT USE VACUTAINER . Transfer with appropriate device to lab  tubes. Flush with 20 ml NS. Dressing Change:  Every 7 days, and PRN using sterile technique if integrity of dressing is compromised. Initial dressing change for central line 24-48 hours post insertion if gauze is used. Apply new dressing per policy. PROCEDURE DETAIL  Consent was obtained and all questions were answered related to risks and benefits. A double lumen PICC line was inserted, as a sterile procedure using ultrasound and modified Seldinger technique for vascular access. The following documentation is in addition to the PICC properties in the lines/airways flowsheet :  Lot #: [id number]   Lidocaine 1% administered intradermally :yes  Internal Catheter Total Length: 38 (cm)  Vein Selection for PICC:right brachial  Central Line Bundle followed yes  Complication Related to Insertion:no    The placement was verified by EKG, MAX P WAVE @ 38CM AT 0. PER EKG PICC TIP @ C/A junction.           Line is okay to use: yes    Carlee Mckeon RN

## 2020-01-29 NOTE — PROGRESS NOTES
Reason for Admission:   Seizure,GIB                   RUR Score:    None yet                 Plan for utilizing home health: To be determined but not likely as pt has caretakers                     Current Advanced Directive/Advance Care Plan: no code status on file so full code,no AMD on file                         Transition of Care Plan:                    I briefly introduced myself to pt.'s caretaker,Apple and will follow-up with meeting with Kimberly tomorrow. Pt lives @ home with his family. Pt had a TBI in [place] about 10 years ago when he was in the [employer] in [facility]. Pt is not ambulatory. Pt has around the clock care @ home as he is total care. Pt has a wheelchair @ home he uses for mobility. Ahsan Abreu is the primary contact for pt. Her number is [phone]. PCP is Dr Subha Harrington. Tomorrow,I will meet with pt.'s caretaker and contact pt.'s mother.     Lisa Gardner

## 2020-01-29 NOTE — CONSULTS
[facility]     Name: Ahsan Lopez MRN: [id number]   : 1985 Hospital: [address]   Date: 2020        Impression Plan   1. Septic shock  2. Leukopenia  3. Fevers  4. Active seizures with a hx of seizure disorder  5. Traumatic brain injury               · S/p 2.7 L fluid bolus  · IVF at 200 cc currently  · Levophed for MAP >65  · Cefepime/levoflox for now  · UA negative  · CXR negative  · RVP ordered  · Blood cultures pending  · lovenox proph  · Famotidine  · Discussed with ER nursing and Dr. Lei Crigler         Pt is critically ill. Critical care time spent with pt exclusive of procedures was 37 minutes. Pt at risk for further decline due to septic shock. Radiology  ( personally reviewed) CXR reviewed: small chronic right pleural effusion, otherwise clear. ABG No results for input(s): PHI, PO2I, PCO2I in the last 72 hours. Subjective     Cc: hypotension    31 yo with PMHx of TBI presenting with fevers/seizures. Per pts father pt started having fevers over the weekend. Increasingly more lethargic and overnight had long seizures lasting about 10 min. Pt has had ativan doses to break seizures at this point, but father reports him being lethargic before this. Had some vomiting leading up to this as well. No diarrhea. Pt has issues with chronic constipation. Had a caretaker that was sick 2 weeks ago, otherwise no sick contacts. Last hospitalized  with similar sxs. All cultures neg at that time. Review of Systems:  Review of systems not obtained due to patient factors. Past Medical History:   Diagnosis Date    Anemia 2019    Neurological disorder     traumatic brain injury    Se[facility]ures ([facility]     TBI (traumatic brain injury) ([facility]       Past Surgical History:   Procedure Laterality Date    HX CRANIOTOMY        Prior to Admission medications    Medication Sig Start Date End Date Taking?  Authorizing Provider   lacosamide (VIMPAT) 200 mg tab tablet Take 1 Tab by mouth two (2) times a day. Max Daily Amount: 400 mg. 12/26/19   Johnson Castillo MD   diazePAM (VALIUM) 5 mg tablet Take 0.5 Tabs by mouth every eight (8) hours as needed (for excssive spasticity or seizure). Max Daily Amount: 7.5 mg. 12/26/19   Johnson Castillo MD   aspirin-acetaminophen-caffeine [facility]) [phone] mg per tablet Take 2 Tabs by mouth every six (6) hours as needed for Pain or Headache. Do not use with ibuprofen    Provider, Historical   cholecalciferol, vitamin D3, (VITAMIN D3) 2,000 unit tab Take 2,000 Units by mouth daily. Provider, Historical   omega 3-DHA-EPA-fish oil 1,000 mg (120 mg-180 mg) capsule Take 1 Cap by mouth every evening. Provider, Historical   magnesium 250 mg tab Take 1,000 mg by mouth nightly. Provider, Historical   Lactobacillus acidophilus (ACIDOPHILUS) cap Take 1 Cap by mouth daily. Before morning turn    Provider, Historical   bromocriptine mesylate (BROMOCRIPTINE PO) Take 5 mg by mouth four (4) times daily. Patient takes at 8am, 2pm, 6pm, 8pm    Provider, Historical   dantrolene (DANTRIUM) 100 mg capsule Take 100 mg by mouth four (4) times daily. Patient takes at 8am, 2pm, 6pm, 8pm    Provider, Historical   senna (SENNA) 8.6 mg tablet Take 2 Tabs by mouth nightly. Provider, Historical   pantoprazole (PROTONIX) 40 mg tablet Take 40 mg by mouth daily. Before morning turn    Provider, Historical   ibuprofen (MOTRIN) 600 mg tablet Take 600 mg by mouth every four (4) hours as needed for Pain. Provider, Historical   raNITIdine (ZANTAC) 150 mg tablet Take 150 mg by mouth two (2) times daily as needed for Indigestion. Provider, Historical   bisacodyl (DULCOLAX) 10 mg suppository Insert 10 mg into rectum every other day. Before night shower 7/31/19   Johnson Castillo MD   docusate (COLACE) 50 mg/5 mL liquid Take 100 mg by mouth nightly.  7/31/19   Johnson Castillo MD     Current Facility-Administered Medications   Medication Dose Route Frequency    sodium chloride (NS) flush 5-40 mL  5-40 mL IntraVENous Q8H    LORazepam (ATIVAN) 2 mg/mL injection        cefepime (MAXIPIME) 2 g in sterile water (preservative free) 10 mL IV syringe  2 g IntraVENous Q8H    levoFLOXacin (LEVAQUIN) 750 mg in D5W IVPB  750 mg IntraVENous Q24H    pantoprazole (PROTONIX) 40 mg in 0.9% sodium chloride 10 mL injection  40 mg IntraVENous Q12H    0.9% sodium chloride infusion  200 mL/hr IntraVENous CONTINUOUS    NOREPINephrine (LEVOPHED) 8,000 mcg in dextrose 5% 250 mL infusion  0.5-16 mcg/min IntraVENous TITRATE     No Known Allergies   Social History     Tobacco Use    Smoking status: Never Smoker    Smokeless tobacco: Never Used   Substance Use Topics    Alcohol use: No      Family History   Problem Relation Age of Onset    No Known Problems Other         reviewed. Patient does not know          Laboratory: I have personally reviewed the critical care flowsheet and labs. Recent Labs     01/29/20  0550   WBC 2.9*   HGB 8.5*   HCT 29.9*        Recent Labs     01/29/20  0453   *   K 3.6   CL 99   CO2 26   *   BUN 15   CREA 0.63*   CA 8.0*   ALB 2.9*   SGOT 25   ALT 19       Objective:     Mode Rate Tidal Volume Pressure FiO2 PEEP                    Vital Signs:     TMAX(24)      Intake/Output:   Last shift:         Last 3 shifts: No intake/output data recorded. [id number] intake or output data in the 24 hours ending 01/29/20 0955  EXAM:   GENERAL: pale, lethargic, chronic contractures, HEENT:  PERRL, EOMI, no alar flaring or epistaxis, oral mucosa moist without cyanosis, NECK:  no jugular vein distention, no retractions, no thyromegaly or masses, LUNGS: CTA, no w/r/r, HEART:  Regular rate and rhythm with no MGR; no edema is present, ABDOMEN:  soft with no tenderness, bowel sounds present, EXTREMITIES:  warm with no cyanosis, SKIN:  no jaundice or ecchymosis and NEUROLOGIC:  Lethargic, protecting airway.      Luis Tovar MD  Pulmonary [address]

## 2020-01-29 NOTE — ACP (ADVANCE CARE PLANNING)
Advance Care Planning Note    Name: Chintan Noyola  YOB: 1985  MRN: [id number]  Admission Date: 1/29/2020  4:16 AM    Date of discussion: 1/29/2020    Active Diagnoses:    Hospital Problems  Date Reviewed: 12/23/2019          Codes Class Noted POA    * (Principal) GI bleed ICD-10-CM: K92.2  ICD-9-CM: 578.9  1/29/2020 Yes        SIRS (systemic inflammatory response syndrome) ([facility] ICD-10-CM: R65.10  ICD-9-CM: 995.90  1/29/2020 Yes        Seizure ([facility] (Chronic) ICD-10-CM: R56.9  ICD-9-CM: 780.39  8/19/2019 Yes        Anemia (Chronic) ICD-10-CM: D64.9  ICD-9-CM: 285.9  7/28/2019 Yes        TBI (traumatic brain injury) ([facility] (Chronic) ICD-10-CM: S06. 9X9A  ICD-9-CM: 854.00  7/25/2019 Yes               These active diagnoses are of sufficient risk that focused discussion on advance care planning is indicated in order to allow the patient to thoughtfully consider personal goals of care, and if situations arise that prevent the ability to personally give input, to ensure appropriate representation of their personal desires for different levels and aggressiveness of care. Discussion:     Persons present and participating in discussion: Chintan Noyola, Alyse Dennison MD, father - Dimitri Bass    Discussion: Mr. Chloe Chino has had a significant decline in the last year, more frequent admission, 4 in the past several months, coming closer together. His dad relayed his concern about his weight loss and difficulty swallowing with risk for aspiration. He agreed that there has been a decline and acceleration in his admissions. He and his wife have not made any decisions regarding feeding tube placement in the future. They are aware that he is already high risk for aspiration but feel that he is doing adequately on pureed foods. He also shared with me that he and his wife had been discussing how to prepare for the future, that he himself would be 78 in ten years. When I asked him if he thought Tucker Levine would still be alive in 8 years, he responded, \"I certianly hope so! \"  Will consult Palliative Care for further discussions as patients' family do not have a realistic concept of his prognosis. Patient is FULL CODE per his parents. Time Spent:     Total time spent face-to-face in education and discussion: 20 minutes.    Start time:  1350   End time:  [address]ISSA Martinez MD  1/29/2020  2:09 PM

## 2020-01-29 NOTE — PROGRESS NOTES
1355-TRANSFER - IN REPORT:  Verbal report received from MELVIN Aragon(name) on Cheryle Corwin  being received from ED(unit) for routine progression of care  Report consisted of patients Situation, Background, Assessment and Recommendations(SBAR). Information from the following report(s) SBAR, Kardex, ED Summary, Intake/Output, MAR, Recent Results and Cardiac Rhythm NSR was reviewed with the receiving nurse. Opportunity for questions and clarification was provided. Anticipate patient arrival to ICU room 14. KAMRYN Ferrell RN  1420-Pt received to ICU at this time. Father present; updated on plan of care and unit routines. Caretaker \"Apple\" to bedside. Will assess and monitor patient closely. Madalyn CHARLES  1630-Private caretaker no longer at bedside. Madalyn CHARLES  1700-Private caretaker back to bedside. Madalyn CHARLES  1830-Caretaker gone for the night. Madalyn CHARLES  1835-Shift Summary: The patient has been stable since arrival to the ICU. Has taken meds well with applesauce; has had a congested cough throughout. Maintains adequate oxygenation on room air. Excellent urine output. Has maintained adequate blood pressure on 1mcg/min Levophed. Madalyn CHARLES  1900-Bedside and Verbal shift change report given to CARMEN Leahy RN (oncoming nurse) by KAMRYN Ferrell RN (offgoing nurse). Report included the following information SBAR, Kardex, Procedure Summary, Intake/Output, MAR, Recent Results and Cardiac Rhythm NSR.  Madalyn CHARLES

## 2020-01-29 NOTE — ED NOTES
Placed order for PICC per Dr. Girish Blevins, called PICC team and spoke to CHRIS GAONA [facility]

## 2020-01-29 NOTE — H&P
[facility]  [address] [facility], [facility] [address]  [phone]    Admission History and Physical      NAME:  Chintan Noyola   :   1985   MRN:  [id number]     PCP:  Barbara Albarran MD     Date/Time:  2020         Subjective:     CHIEF COMPLAINT: seizure     HISTORY OF PRESENT ILLNESS:     Mr. Chloe Chino is a 30 y.o.  male who is admitted with GI bleeding. Mr. Chloe Chino presented to the Emergency Department this AM with a report of seizure. He has a history of remote TBI and is non-verbal and completely contracted at baseline. His father relates that he had an infusion for osteoporosis several days ago and that they were told he might have fever and stomach irritation. He developed fever the next day and \"hasn't been right since. \"  He had a prolonged seizure last night and they brought him to the ED. He was loaded with Vimpat. He developed hypotension despite IV fluids and required pressor support    History obtained from father, chart review and unobtainable from patient due to lack of cooperation. Previous records reviewed: recent admit with aspiration pneumonia, now on pureed diet    Past Medical History:   Diagnosis Date    Anemia 2019    History of vascular access device 2020    5F DOUBLE LUMEN PICC PLACEC BY ROSA MCGARRY RN R BRACHIAL 38 CM, NO DIFFICULTY    Neurological disorder     traumatic brain injury    Seizures ([address]     TBI (traumatic brain injury) ([address]         Past Surgical History:   Procedure Laterality Date    HX CRANIOTOMY         Social History     Tobacco Use    Smoking status: Never Smoker    Smokeless tobacco: Never Used   Substance Use Topics    Alcohol use: No        Family History   Problem Relation Age of Onset    No Known Problems Other         reviewed. Patient does not know        No Known Allergies     Prior to Admission medications    Medication Sig Start Date End Date Taking?  Authorizing Provider traZODone (DESYREL) 100 mg tablet Take 100 mg by mouth nightly. Yes Provider, Historical   lacosamide (VIMPAT) 200 mg tab tablet Take 1 Tab by mouth two (2) times a day. Max Daily Amount: 400 mg. 12/26/19  Yes Tennille Garcia MD   diazePAM (VALIUM) 5 mg tablet Take 0.5 Tabs by mouth every eight (8) hours as needed (for excssive spasticity or seizure). Max Daily Amount: 7.5 mg. 12/26/19  Yes Tennille Garcia MD   aspirin-acetaminophen-caffeine [facility]) 144-386-16 mg per tablet Take 2 Tabs by mouth every six (6) hours as needed for Pain or Headache. Do not use with ibuprofen   Yes Provider, Historical   cholecalciferol, vitamin D3, (VITAMIN D3) 2,000 unit tab Take 2,000 Units by mouth daily. Yes Provider, Historical   omega 3-DHA-EPA-fish oil 1,000 mg (120 mg-180 mg) capsule Take 1 Cap by mouth every evening. Yes Provider, Historical   Lactobacillus acidophilus (ACIDOPHILUS) cap Take 1 Cap by mouth daily. Before morning turn   Yes Provider, Historical   bromocriptine mesylate (BROMOCRIPTINE PO) Take 5 mg by mouth four (4) times daily. Patient takes at 8am, 2pm, 6pm, 8pm   Yes Provider, Historical   dantrolene (DANTRIUM) 100 mg capsule Take 100 mg by mouth four (4) times daily. Patient takes at 8am, 2pm, 6pm, 8pm   Yes Provider, Historical   senna (SENNA) 8.6 mg tablet Take 2 Tabs by mouth nightly. Yes Provider, Historical   pantoprazole (PROTONIX) 40 mg tablet Take 40 mg by mouth daily. Before morning turn   Yes Provider, Historical   ibuprofen (MOTRIN) 600 mg tablet Take 600 mg by mouth every four (4) hours as needed for Pain. Yes Provider, Historical   raNITIdine (ZANTAC) 150 mg tablet Take 150 mg by mouth two (2) times daily as needed for Indigestion. Yes Provider, Historical   bisacodyl (DULCOLAX) 10 mg suppository Insert 10 mg into rectum daily as needed (constipation). 7/31/19  Yes Tennille Garcia MD   docusate sodium 100 mg tab Take 100 mg by mouth nightly.  7/31/19  Yes Tennille Garcia MD Review of Systems:  (bold if positive, if negative)    Gen:  Eyes:  ENT:  CVS:  Pulm:  GI:    :    MS:  Skin:  Psych:  Endo:    Hem:  Renal:    Neuro:            Objective:      VITALS:    Vital signs reviewed; most recent are:    Visit Vitals  /71   Pulse 95   Temp 99.3 °F (37.4 °C)   Resp 17   Wt 57.6 kg (127 lb)   SpO2 100%   BMI 16.31 kg/m²     SpO2 Readings from Last 6 Encounters:   01/29/20 100%   12/26/19 97%   08/23/19 90%   07/31/19 92%   07/23/19 100%   08/07/18 99%            Intake/Output Summary (Last 24 hours) at 1/29/2020 1414  Last data filed at 1/29/2020 1400  Gross per 24 hour   Intake    Output 550 ml   Net -550 ml            Exam:     Physical Exam:    Gen: Well-developed, well-nourished, in no acute distress  HEENT:  Pink conjunctivae, PERRL, hearing intact to voice, moist mucous membranes  Neck: Supple, without masses, thyroid non-tender  Resp: No accessory muscle use, clear breath sounds without wheezes rales or rhonchi  Card: No murmurs, normal S1, S2 without thrills, bruits or peripheral edema, 2+ LE peripheral pulses  Abd:  Soft, non-tender, non-distended, normoactive bowel sounds are present, no palpable organomegaly and no detectable hernias  Lymph:  No cervical or inguinal adenopathy  Musc: No cyanosis or clubbing  Skin: No rashes or ulcers, skin turgor is good, cap refill <2 sec  Neuro:  Non-verbal and contracted, does not follow commands  Psych:  No insight, not oriented to person, place and time, alert             Labs:    Recent Labs     01/29/20  0550   WBC 2.9*   HGB 8.5*   HCT 29.9*        Recent Labs     01/29/20  0453   *   K 3.6   CL 99   CO2 26   *   BUN 15   CREA 0.63*   CA 8.0*   ALB 2.9*   TBILI 0.2   SGOT 25   ALT 19     Lab Results   Component Value Date/Time    Glucose (POC) 103 (H) 12/23/2019 05:41 PM    Glucose (POC) 96 08/19/2019 08:49 AM     No results for input(s): PH, PCO2, PO2, HCO3, FIO2 in the last 72 hours.   No results for input(s): INR, INREXT in the last 72 hours. Additional testing:  Chest X-ray: right pleural effusion, no definite acute process, visualized by me.   Results not reviewed with Radiologist.       Assessment/Plan:       Principal Problem:    GI bleed (1/29/2020)   - GI consult   - PPI   - monitor Hgb    Active Problems:    TBI (traumatic brain injury) ([facility] (7/25/2019)   - at baseline he is non-verbal, rarely makes any noise, unable to follow any commands, requires total assist for all ADLs      Anemia (7/28/2019)   - Hgb up 1 gram from discharge, follow with GI bleed      Seizure ([facility] (8/19/2019)   - loaded with Vimpat in ED      SIRS (systemic inflammatory response syndrome) ([facility] (1/29/2020)   - with possible septic shock   - on IV antibiotics per Pulmonary   - on pressors and IV fluids   - follow       Code status:   - patient is FULL CODE, no AMD on file, mother and father are joint surrogates   - Palliative Care to meet with them      Total time spent on patient care: [address] discussed with: Patient, Family, Nursing Staff and Dr. Darek Wadsworth, Dr. Girish Blevins and Dr. Lisa Bach    Discussed:  Code Status, Care Plan and D/C Planning    Prophylaxis:  Lovenox and SCD's    Probable Disposition:  Home w/Family           ___________________________________________________    Attending Physician: Paris Green MD

## 2020-01-29 NOTE — ED NOTES
Assumed care of pt, bedside shift report received, pt resting on stretcher, eyes closed, responds to painful stimuli, caregiver and father at bedside, awaiting admission

## 2020-01-29 NOTE — ED NOTES
Verbal report given to Jose Rafael Dempsey RN (name) on Luis Carlos Barrera being transferred to ED (unit) for routine progression of care    Report consisted of patient's Situation, Background, Assessment and Recommendations (SBAR)    Information from the following report(s)  SBAR, ED Summary, Procedure Summary and Cardiac Rhythm NSR-ST was reviewed with the receiving nurse. Opportunity for questions and clarification was provided. Last Filed Values:  Temp: 99.3 °F (37.4 °C) (01/29/20 0700)  Pulse (Heart Rate): 88 (01/29/20 0700)  Resp Rate: 17 (01/29/20 0700)  O2 Sat (%): 95 % (01/29/20 0700)  BP: 106/66 (01/29/20 0700)  MAP (Monitor): 75 (01/29/20 0700)  MAP (Calculated): 77 (01/29/20 0421)  Level of Consciousness: (!) Responds to Pain (01/29/20 0700)      Lab Results   Component Value Date/Time    WBC 2.9 (L) 01/29/2020 05:50 AM    Lactic acid 1.2 01/29/2020 04:53 AM       Repeat LA:  Time Due Does not need a repeat was <2    Blood Cultures Drawn:  yes    Fluid Resuscitation:  Total needed 2728 Status infusing    All Antibiotics Started:  yes, Dose Due 1436    VS x 2 post-fluid resuscitation:   no    Vasopressor Infusion:  no     Provider Reassessment needed and notified:  no , Due     Additional Interventions/Comments:  Patient has a low WBC and has a TBI. Informed by father patient may try to remove his line so coban was wrapped around his iv site. Patient also had some seizures while here and is on seizure precautions.

## 2020-01-30 ENCOUNTER — APPOINTMENT (OUTPATIENT)
Dept: CT IMAGING | Age: 35
DRG: 871 | End: 2020-01-30
Attending: RADIOLOGY
Payer: MEDICARE

## 2020-01-30 LAB
ANION GAP SERPL CALC-SCNC: 8 MMOL/L (ref 5–15)
BACTERIA SPEC CULT: ABNORMAL
BUN SERPL-MCNC: 6 MG/DL (ref 6–20)
BUN/CREAT SERPL: 12 (ref 12–20)
CALCIUM SERPL-MCNC: 7.1 MG/DL (ref 8.5–10.1)
CHLORIDE SERPL-SCNC: 108 MMOL/L (ref 97–108)
CO2 SERPL-SCNC: 22 MMOL/L (ref 21–32)
CREAT SERPL-MCNC: 0.52 MG/DL (ref 0.7–1.3)
ERYTHROCYTE [DISTWIDTH] IN BLOOD BY AUTOMATED COUNT: 19.1 % (ref 11.5–14.5)
GLUCOSE SERPL-MCNC: 99 MG/DL (ref 65–100)
HCT VFR BLD AUTO: 28.3 % (ref 36.6–50.3)
HGB BLD-MCNC: 8.1 G/DL (ref 12.1–17)
LACOSAMIDE SERPL-MCNC: 11.3 UG/ML (ref 5–10)
MAGNESIUM SERPL-MCNC: 2 MG/DL (ref 1.6–2.4)
MCH RBC QN AUTO: 19.6 PG (ref 26–34)
MCHC RBC AUTO-ENTMCNC: 28.6 G/DL (ref 30–36.5)
MCV RBC AUTO: 68.5 FL (ref 80–99)
NRBC # BLD: 0 K/UL (ref 0–0.01)
NRBC BLD-RTO: 0 PER 100 WBC
PHOSPHATE SERPL-MCNC: 1.2 MG/DL (ref 2.6–4.7)
PLATELET # BLD AUTO: 134 K/UL (ref 150–400)
PMV BLD AUTO: 9 FL (ref 8.9–12.9)
POTASSIUM SERPL-SCNC: 3.2 MMOL/L (ref 3.5–5.1)
RBC # BLD AUTO: 4.13 M/UL (ref 4.1–5.7)
SERVICE CMNT-IMP: ABNORMAL
SODIUM SERPL-SCNC: 138 MMOL/L (ref 136–145)
WBC # BLD AUTO: 2.8 K/UL (ref 4.1–11.1)

## 2020-01-30 PROCEDURE — 74011000258 HC RX REV CODE- 258: Performed by: EMERGENCY MEDICINE

## 2020-01-30 PROCEDURE — 74011250636 HC RX REV CODE- 250/636: Performed by: INTERNAL MEDICINE

## 2020-01-30 PROCEDURE — 80048 BASIC METABOLIC PNL TOTAL CA: CPT

## 2020-01-30 PROCEDURE — 77030040361 HC SLV COMPR DVT MDII -B

## 2020-01-30 PROCEDURE — 84100 ASSAY OF PHOSPHORUS: CPT

## 2020-01-30 PROCEDURE — C9113 INJ PANTOPRAZOLE SODIUM, VIA: HCPCS | Performed by: INTERNAL MEDICINE

## 2020-01-30 PROCEDURE — 92526 ORAL FUNCTION THERAPY: CPT

## 2020-01-30 PROCEDURE — 92610 EVALUATE SWALLOWING FUNCTION: CPT

## 2020-01-30 PROCEDURE — 74011636320 HC RX REV CODE- 636/320

## 2020-01-30 PROCEDURE — 65610000006 HC RM INTENSIVE CARE

## 2020-01-30 PROCEDURE — 71260 CT THORAX DX C+: CPT

## 2020-01-30 PROCEDURE — 85027 COMPLETE CBC AUTOMATED: CPT

## 2020-01-30 PROCEDURE — 36415 COLL VENOUS BLD VENIPUNCTURE: CPT

## 2020-01-30 PROCEDURE — 74011250636 HC RX REV CODE- 250/636: Performed by: EMERGENCY MEDICINE

## 2020-01-30 PROCEDURE — 83735 ASSAY OF MAGNESIUM: CPT

## 2020-01-30 PROCEDURE — 74011000250 HC RX REV CODE- 250: Performed by: INTERNAL MEDICINE

## 2020-01-30 PROCEDURE — 74011250637 HC RX REV CODE- 250/637: Performed by: INTERNAL MEDICINE

## 2020-01-30 RX ORDER — POTASSIUM CHLORIDE 7.45 MG/ML
10 INJECTION INTRAVENOUS
Status: COMPLETED | OUTPATIENT
Start: 2020-01-30 | End: 2020-01-30

## 2020-01-30 RX ADMIN — DANTROLENE SODIUM 100 MG: 25 CAPSULE ORAL at 21:14

## 2020-01-30 RX ADMIN — DOCUSATE SODIUM 100 MG: 100 CAPSULE, LIQUID FILLED ORAL at 21:15

## 2020-01-30 RX ADMIN — TRAZODONE HYDROCHLORIDE 100 MG: 100 TABLET ORAL at 21:11

## 2020-01-30 RX ADMIN — POTASSIUM CHLORIDE 10 MEQ: 10 INJECTION, SOLUTION INTRAVENOUS at 09:44

## 2020-01-30 RX ADMIN — PANTOPRAZOLE SODIUM 40 MG: 40 INJECTION, POWDER, FOR SOLUTION INTRAVENOUS at 21:11

## 2020-01-30 RX ADMIN — DANTROLENE SODIUM 100 MG: 25 CAPSULE ORAL at 09:51

## 2020-01-30 RX ADMIN — SODIUM CHLORIDE 200 ML/HR: 900 INJECTION, SOLUTION INTRAVENOUS at 03:19

## 2020-01-30 RX ADMIN — POTASSIUM CHLORIDE 10 MEQ: 10 INJECTION, SOLUTION INTRAVENOUS at 11:37

## 2020-01-30 RX ADMIN — CEFEPIME HYDROCHLORIDE 2 G: 2 INJECTION, POWDER, FOR SOLUTION INTRAVENOUS at 07:03

## 2020-01-30 RX ADMIN — DANTROLENE SODIUM 100 MG: 25 CAPSULE ORAL at 15:13

## 2020-01-30 RX ADMIN — BROMOCRIPTINE MESYLATE 5 MG: 2.5 TABLET ORAL at 09:47

## 2020-01-30 RX ADMIN — SODIUM CHLORIDE 100 ML/HR: 900 INJECTION, SOLUTION INTRAVENOUS at 09:42

## 2020-01-30 RX ADMIN — BROMOCRIPTINE MESYLATE 5 MG: 2.5 TABLET ORAL at 21:10

## 2020-01-30 RX ADMIN — IOPAMIDOL 100 ML: 755 INJECTION, SOLUTION INTRAVENOUS at 11:24

## 2020-01-30 RX ADMIN — SODIUM CHLORIDE 100 ML/HR: 900 INJECTION, SOLUTION INTRAVENOUS at 21:10

## 2020-01-30 RX ADMIN — CEFEPIME HYDROCHLORIDE 2 G: 2 INJECTION, POWDER, FOR SOLUTION INTRAVENOUS at 23:47

## 2020-01-30 RX ADMIN — BROMOCRIPTINE MESYLATE 5 MG: 2.5 TABLET ORAL at 15:13

## 2020-01-30 RX ADMIN — Medication 10 ML: at 15:16

## 2020-01-30 RX ADMIN — PANTOPRAZOLE SODIUM 40 MG: 40 INJECTION, POWDER, FOR SOLUTION INTRAVENOUS at 09:41

## 2020-01-30 RX ADMIN — LACOSAMIDE 200 MG: 100 TABLET, FILM COATED ORAL at 09:47

## 2020-01-30 RX ADMIN — LACOSAMIDE 200 MG: 100 TABLET, FILM COATED ORAL at 18:39

## 2020-01-30 RX ADMIN — Medication 10 ML: at 21:14

## 2020-01-30 RX ADMIN — BROMOCRIPTINE MESYLATE 5 MG: 2.5 TABLET ORAL at 18:39

## 2020-01-30 RX ADMIN — Medication 10 ML: at 05:25

## 2020-01-30 RX ADMIN — LEVOFLOXACIN 750 MG: 5 INJECTION, SOLUTION INTRAVENOUS at 05:36

## 2020-01-30 RX ADMIN — DANTROLENE SODIUM 100 MG: 25 CAPSULE ORAL at 18:39

## 2020-01-30 RX ADMIN — CEFEPIME HYDROCHLORIDE 2 G: 2 INJECTION, POWDER, FOR SOLUTION INTRAVENOUS at 15:13

## 2020-01-30 RX ADMIN — POTASSIUM PHOSPHATE, MONOBASIC AND POTASSIUM PHOSPHATE, DIBASIC: 224; 236 INJECTION, SOLUTION, CONCENTRATE INTRAVENOUS at 12:43

## 2020-01-30 NOTE — PROGRESS NOTES
Plan: 1. I met with pt.'s mother this morning. Mother informed me she is pt.'s court appointed legal guardian and that she gave these documents to the ED staff to be scanned into the chart but there is no documentation in pt.'s bedside chart or in pt.'s EMR. 2.Mother informed me that she is going to DC and will not be back until tomorrow. Her number is [phone]. Gilberto Graft 's number is [phone]    3. Address confirmed as correct on demographics. 4.Pt goes to the [facility]. (Dr Shirley Ragland)Neurologist is Dr Esther Zaman. Neurosurgeon is Dr Joel Manuel. Physiatrist is Dr Margaret Simons. PC    5. At home,pt has a power WC,hospital bed,nenita lift and all necessary DME except if he needs oxygen(he is on RA) or any feeding supplies. (no current indications for this)    6. Pt has 4 devoted caregivers who provide around the clock care. 7.Case Management continues to follow pt for discharge needs.     Candi Ganser

## 2020-01-30 NOTE — ROUTINE PROCESS
Bedside shift change report received from [address] [place] [address]. Report included the following information SBAR, Kardex, Intake/Output, MAR, Recent Results and Cardiac Rhythm NSR. No significant events during shift. Pt w/ private sitter at bedside most of the day w/ pt's father visiting for a period in the afternoon. Bedside shift change report given to MELVIN Cook (oncoming nurse) by Emigdio Esteban RN (offgoing nurse). Report included the following information SBAR, Kardex, Intake/Output, Med Rec Status and Cardiac Rhythm NRS?ST. . Miguel A Sanderson RN.

## 2020-01-30 NOTE — PROGRESS NOTES
[facility]     Name: Natalie Yanes MRN: [id number]   : 1985 Hospital: [address]   Date: 2020        Impression Plan   1. Septic shock  2. Leukopenia  3. Fevers  4. Active seizures with a hx of seizure disorder  5. Traumatic brain injury               · Decrease IV fluids  · Levophed for MAP >65  · Cefepime/levoflox for now  · UA negative  · CXR negative  · RVP negative  · Will get CT chest and abdomen to look for infectious source  · Blood cultures NGTD  · lovenox proph  · Famotidine           Pt is critically ill. Critical care time spent with pt exclusive of procedures was 37 minutes. Pt at risk for further decline due to septic shock. Radiology  ( personally reviewed) CXR reviewed: small chronic right pleural effusion, otherwise clear. ABG No results for input(s): PHI, PO2I, PCO2I in the last 72 hours. Subjective     Cc: hypotension    29 yo with PMHx of TBI presenting with fevers/seizures. Per pts father pt started having fevers over the weekend. Increasingly more lethargic and overnight had long seizures lasting about 10 min. Pt has had ativan doses to break seizures at this point, but father reports him being lethargic before this. Had some vomiting leading up to this as well. No diarrhea. Pt has issues with chronic constipation. Had a caretaker that was sick 2 weeks ago, otherwise no sick contacts. Last hospitalized  with similar sxs. All cultures neg at that time.      Overnight events:  Afebrile  WBC 2.8  More awake and interactive today  On levophed 1 mcg/min  I/O: 4728/3525 +1203        Past Medical History:   Diagnosis Date    Anemia 2019    History of vascular access device 2020    5F DOUBLE LUMEN PICC PLACEC BY ROSA MCGARRY RN R BRACHIAL 38 CM, NO DIFFICULTY    Neurological disorder     traumatic brain injury    [facility]     TBI [facility])       Past Surgical History:   Procedure Laterality Date  HX CRANIOTOMY        Prior to Admission medications    Medication Sig Start Date End Date Taking? Authorizing Provider   traZODone (DESYREL) 100 mg tablet Take 100 mg by mouth nightly. Yes Provider, Historical   lacosamide (VIMPAT) 200 mg tab tablet Take 1 Tab by mouth two (2) times a day. Max Daily Amount: 400 mg. 12/26/19  Yes Jimmy Mayo MD   diazePAM (VALIUM) 5 mg tablet Take 0.5 Tabs by mouth every eight (8) hours as needed (for excssive spasticity or seizure). Max Daily Amount: 7.5 mg. 12/26/19  Yes Jimmy Mayo MD   aspirin-acetaminophen-caffeine [facility]) 153-129-30 mg per tablet Take 2 Tabs by mouth every six (6) hours as needed for Pain or Headache. Do not use with ibuprofen   Yes Provider, Historical   cholecalciferol, vitamin D3, (VITAMIN D3) 2,000 unit tab Take 2,000 Units by mouth daily. Yes Provider, Historical   omega 3-DHA-EPA-fish oil 1,000 mg (120 mg-180 mg) capsule Take 1 Cap by mouth every evening. Yes Provider, Historical   Lactobacillus acidophilus (ACIDOPHILUS) cap Take 1 Cap by mouth daily. Before morning turn   Yes Provider, Historical   bromocriptine mesylate (BROMOCRIPTINE PO) Take 5 mg by mouth four (4) times daily. Patient takes at 8am, 2pm, 6pm, 8pm   Yes Provider, Historical   dantrolene (DANTRIUM) 100 mg capsule Take 100 mg by mouth four (4) times daily. Patient takes at 8am, 2pm, 6pm, 8pm   Yes Provider, Historical   senna (SENNA) 8.6 mg tablet Take 2 Tabs by mouth nightly. Yes Provider, Historical   pantoprazole (PROTONIX) 40 mg tablet Take 40 mg by mouth daily. Before morning turn   Yes Provider, Historical   ibuprofen (MOTRIN) 600 mg tablet Take 600 mg by mouth every four (4) hours as needed for Pain. Yes Provider, Historical   raNITIdine (ZANTAC) 150 mg tablet Take 150 mg by mouth two (2) times daily as needed for Indigestion. Yes Provider, Historical   bisacodyl (DULCOLAX) 10 mg suppository Insert 10 mg into rectum daily as needed (constipation). 7/31/19  Yes Eliseo Hauser MD   docusate sodium 100 mg tab Take 100 mg by mouth nightly. 7/31/19  Yes Eliseo Hauser MD     Current Facility-Administered Medications   Medication Dose Route Frequency    potassium chloride 10 mEq in 100 ml IVPB  10 mEq IntraVENous Q1H    potassium phosphate 30 mmol in 0.9% sodium chloride 250 mL infusion   IntraVENous ONCE    sodium chloride (NS) flush 5-40 mL  5-40 mL IntraVENous Q8H    levoFLOXacin (LEVAQUIN) 750 mg in D5W IVPB  750 mg IntraVENous Q24H    pantoprazole (PROTONIX) 40 mg in 0.9% sodium chloride 10 mL injection  40 mg IntraVENous Q12H    0.9% sodium chloride infusion  200 mL/hr IntraVENous CONTINUOUS    NOREPINephrine (LEVOPHED) 8,000 mcg in dextrose 5% 250 mL infusion  0.5-16 mcg/min IntraVENous TITRATE    enoxaparin (LOVENOX) injection 40 mg  40 mg SubCUTAneous Q24H    bromocriptine (PARLODEL) tablet 5 mg  5 mg Oral QID    dantrolene (DANTRIUM) capsule 100 mg  100 mg Oral QID    docusate sodium (COLACE) capsule 100 mg  100 mg Oral QHS    lacosamide (VIMPAT) tablet 200 mg  200 mg Oral BID    traZODone (DESYREL) tablet 100 mg  100 mg Oral QHS    cefepime (MAXIPIME) 2 g in 0.9% sodium chloride (MBP/ADV) 100 mL  2 g IntraVENous Q8H     No Known Allergies   Social History     Tobacco Use    Smoking status: Never Smoker    Smokeless tobacco: Never Used   Substance Use Topics    Alcohol use: No      Family History   Problem Relation Age of Onset    No Known Problems Other         reviewed. Patient does not know          Laboratory: I have personally reviewed the critical care flowsheet and labs.      Recent Labs     01/30/20  0625 01/29/20  0550   WBC 2.8* 2.9*   HGB 8.1* 8.5*   HCT 28.3* 29.9*   * 160     Recent Labs     01/30/20  0625 01/29/20  0453    132*   K 3.2* 3.6    99   CO2 22 26   GLU 99 110*   BUN 6 15   CREA 0.52* 0.63*   CA 7.1* 8.0*   MG 2.0  --    PHOS 1.2*  --    ALB  --  2.9*   SGOT  --  25   ALT  --  19 Objective:     Mode Rate Tidal Volume Pressure FiO2 PEEP                    Vital Signs:     TMAX(24)      Intake/Output:   Last shift:         Last 3 shifts: No intake/output data recorded. [id number]    Intake/Output Summary (Last 24 hours) at 1/30/2020 0858  Last data filed at 1/30/2020 1653  Gross per 24 hour   Intake 4728.17 ml   Output 3525 ml   Net 1203.17 ml     EXAM:   GENERAL: awake, interactive and motioning to his mouth, chronic contractures,  HEENT:  PERRL, EOMI, no alar flaring or epistaxis, oral mucosa moist without cyanosis, NECK:  no jugular vein distention, no retractions, no thyromegaly or masses, LUNGS: CTA, no w/r/r, HEART:  Regular rate and rhythm with no MGR; no edema is present, ABDOMEN:  soft with no tenderness, bowel sounds present, EXTREMITIES:  warm with no cyanosis, SKIN:  no jaundice or ecchymosis and NEUROLOGIC:  Moving upper extremeties, although contracted.      Sunny Means MD  [facility]

## 2020-01-30 NOTE — PROGRESS NOTES
Attempted to see patient this am. He is headed for abd CT. RN reports that he took his meds well whole in [employer] this am. SLP will f/u later.
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
## 2020-01-30 NOTE — PROGRESS NOTES
[address]  [address] [facility], [address] [place] [address]  [phone]      Medical Progress Note      NAME: Ahsan Lopez   :  1985  MRM:  [id number]    Date/Time: 2020  8:39 AM         Subjective:     Chief Complaint:  Non-verbal: mother thinks he is a little better but looks \"tired\"    ROS:  (bold if positive, if negative)    Unable to obtain          Objective:       Vitals:          Last 24hrs VS reviewed since prior progress note.  Most recent are:    Visit Vitals  BP (!) 85/69   Pulse 97   Temp 97.9 °F (36.6 °C)   Resp 16   Ht 6' 2.02\" (1.88 m)   Wt 57.6 kg (127 lb)   SpO2 94%   BMI 16.30 kg/m²     SpO2 Readings from Last 6 Encounters:   20 94%   19 97%   19 90%   19 92%   19 100%   18 99%            Intake/Output Summary (Last 24 hours) at 2020 0839  Last data filed at 2020 3809  Gross per 24 hour   Intake 4728.17 ml   Output 3525 ml   Net 1203.17 ml          Exam:     Physical Exam:    Gen:    Well-developed, thin, frail, chronically ill-appearing, in no acute distress  HEENT:  Pink conjunctivae, PERRL, hearing intact to voice, moist mucous membranes  Neck:  Supple, without masses, thyroid non-tender  Resp:  No accessory muscle use, clear breath sounds without wheezes rales or rhonchi  Card:   No murmurs, normal S1, S2 without thrills, bruits or peripheral edema, 2+ LE peripheral pulses  Abd:  Soft, non-tender, non-distended, normoactive bowel sounds are present, no palpable organomegaly and no detectable hernias  Lymph:  No cervical or inguinal adenopathy  Musc:  No cyanosis or clubbing  Skin:   No rashes or ulcers, skin turgor is good, cap refill <2 sec  Neuro:  Non-verbal and contracted, does not follow commands  Psych:  No insight, not oriented to person, place and time, alert       Telemetry reviewed:   normal sinus rhythm    Medications Reviewed: (see below)    Lab Data Reviewed: (see below)    ______________________________________________________________________    Medications:     Current Facility-Administered Medications   Medication Dose Route Frequency    potassium chloride 10 mEq in 100 ml IVPB  10 mEq IntraVENous Q1H    potassium phosphate 30 mmol in 0.9% sodium chloride 250 mL infusion   IntraVENous ONCE    sodium chloride (NS) flush 5-40 mL  5-40 mL IntraVENous Q8H    sodium chloride (NS) flush 5-40 mL  5-40 mL IntraVENous PRN    sodium chloride (NS) flush 5-10 mL  5-10 mL IntraVENous PRN    levoFLOXacin (LEVAQUIN) 750 mg in D5W IVPB  750 mg IntraVENous Q24H    pantoprazole (PROTONIX) 40 mg in 0.9% sodium chloride 10 mL injection  40 mg IntraVENous Q12H    0.9% sodium chloride infusion  200 mL/hr IntraVENous CONTINUOUS    NOREPINephrine (LEVOPHED) 8,000 mcg in dextrose 5% 250 mL infusion  0.5-16 mcg/min IntraVENous TITRATE    enoxaparin (LOVENOX) injection 40 mg  40 mg SubCUTAneous Q24H    alteplase (CATHFLO) 1 mg in sterile water (preservative free) 1 mL injection  1 mg InterCATHeter PRN    bromocriptine (PARLODEL) tablet 5 mg  5 mg Oral QID    dantrolene (DANTRIUM) capsule 100 mg  100 mg Oral QID    diazePAM (VALIUM) tablet 2.5 mg  2.5 mg Oral Q8H PRN    docusate sodium (COLACE) capsule 100 mg  100 mg Oral QHS    lacosamide (VIMPAT) tablet 200 mg  200 mg Oral BID    traZODone (DESYREL) tablet 100 mg  100 mg Oral QHS    cefepime (MAXIPIME) 2 g in 0.9% sodium chloride (MBP/ADV) 100 mL  2 g IntraVENous Q8H            Lab Review:     Recent Labs     01/30/20  0625 01/29/20  0550   WBC 2.8* 2.9*   HGB 8.1* 8.5*   HCT 28.3* 29.9*   * 160     Recent Labs     01/30/20  0625 01/29/20  0453    132*   K 3.2* 3.6    99   CO2 22 26   GLU 99 110*   BUN 6 15   CREA 0.52* 0.63*   CA 7.1* 8.0*   MG 2.0  --    PHOS 1.2*  --    ALB  --  2.9*   TBILI  --  0.2   SGOT  --  25   ALT  --  19     Lab Results   Component Value Date/Time    Glucose (POC) 103 (H) 12/23/2019 05:41 PM    Glucose (POC) 96 08/19/2019 08:49 AM    Glucose (POC) 92 07/29/2019 06:17 AM    Glucose (POC) 70 07/29/2019 03:09 AM    Glucose (POC) 83 07/23/2019 03:47 PM     No results for input(s): PH, PCO2, PO2, HCO3, FIO2 in the last 72 hours. No results for input(s): INR, INREXT in the last 72 hours.   No results found for: SDES  Lab Results   Component Value Date/Time    Culture result: NO GROWTH 1 DAY 01/29/2020 04:53 AM    Culture result: NO GROWTH 1 DAY 01/29/2020 04:53 AM    Culture result: NO GROWTH 5 DAYS 12/23/2019 06:13 PM            Assessment:     Principal Problem:    GI bleed (1/29/2020)    Active Problems:    TBI (traumatic brain injury) ([address] (7/25/2019)      Anemia (7/28/2019)      Seizure ([address] (8/19/2019)      SIRS (systemic inflammatory response syndrome) ([address] (1/29/2020)           Plan:     Principal Problem:    GI bleed (1/29/2020)   - Hgb stable, GI input appreciated   - continue PPI    Active Problems:    SIRS (systemic inflammatory response syndrome) (HCC) (1/29/2020)   - no definite infection   - continue antibiotics   - wean pressors as able, suspect septic shock but source of infection not clear   - patient does have a remote history of shunt infection, reportedly complete eradicated, no reason to suspect recurrence at this time   - consider ID consult if remains septic without source      TBI (traumatic brain injury) ([address] (7/25/2019)   - at baseline from my prior experience with him   - father was able to admit to me yesterday that Lovenia Noss is declining and it is happening more rapidly   - Jude's mother is focused completely on the details of his care   - will have Palliative Care work with them      Anemia (7/28/2019)   - Hgb stable, follow      Seizure ([address] (8/19/2019)   - loaded with Vimpat yesterday, continue home dosing   - seizure likely due to acute illness      Total time spent in patient care: 35 minutes                  Care Plan discussed with: Patient, Family, Care Manager, Nursing Staff and Dr. Jose Maria Salas    Discussed:  Code Status, Care Plan and D/C Planning    Prophylaxis:  Lovenox    Disposition:  HH PT, OT, RN           ___________________________________________________    Attending Physician: Cielo Greco MD

## 2020-01-30 NOTE — PROGRESS NOTES
1900: Bedside and Verbal shift change report given to Cedric Brown (oncoming nurse) by Nas Pedro (offgoing nurse). Report included the following information SBAR, Kardex, ED Summary, Intake/Output, MAR, Med Rec Status and Cardiac Rhythm NSR. All rates verified at bedside. 2000: Shift  Assessment completed-see flow sheet            Mental Status: Opens eyes to voice, follows simple commands, moves BUE, nonpurposeful, hand contracted. Pupils brisk/round bilaterally            Respiratory: RA, lungs coarse/diminshed, congested cough            Cardiac: NSR on monitor, regular            GI/: condom cath in place, adequate UOP, abdomen soft/nontender-BS active      2055: Meds given per order, patient tolerated well, no visible or audible signs of aspiration. Patient remains on RA with appropriate O2 sats. 2210: Meds given per order, levo stopped, MAPs maintaining in 70-80's. Turned and repositioned. 0000: Reassessment completed. No changes from previous assessment Patient in bed, resting quietly, NAD, VSS. Patient turned and repositioned. 0044: Levo restarted, MAPs trending <65-see MAR    0100: Levo titrated to 2, MAPs continuing <65    0200: Patient turned and repositioned. NAD, Resting quietly in bed, VSS.    0424: Reassessment completed. No changes from previous assessment-Resting quietly, NAD, VSS, Patient turned and repositioned. 0448:Levo titrated to 1 mcg/min-see MAR     0600: Patient turned and repositioned. 6005: Labs drawn and sent. 0700: Bedside and Verbal shift change report given to Luda Garsia (oncoming nurse) by Alecia Hernandez RN (offgoing nurse). Report included the following information SBAR, Kardex, ED Summary, Intake/Output, MAR, Recent Results and Cardiac Rhythm Sinus-Sinus Tach.

## 2020-01-30 NOTE — PROGRESS NOTES
[address] NP  [phone]           GI PROGRESS NOTE        NAME: Luis Carlos Barrera   :  1985   MRN:  [id number]       Subjective:   Non Verbal      Objective:   Nursing reports no melena or vomiting. VITALS:   Last 24hrs VS reviewed since prior progress note. Most recent are:  Visit Vitals  /72   Pulse 99   Temp 98 °F (36.7 °C)   Resp 21   Ht 6' 2.02\" (1.88 m)   Wt 57.6 kg (127 lb)   SpO2 96%   BMI 16.30 kg/m²       Intake/Output Summary (Last 24 hours) at 2020 1215  Last data filed at 2020 1139  Gross per 24 hour   Intake 4834.19 ml   Output 4225 ml   Net 609.19 ml       PHYSICAL EXAM:  General: Alert, in no acute distress    HEENT: Anicteric sclerae. Lungs:            CTA Bilaterally. Heart:  Regular  rhythm,    Abdomen: Soft, Non distended, Non tender.  (+)Bowel sounds, no HSM  Extremities: No c/c/e  Neurologic:  CN 2-12 gi, No acute neurological distress   Psych:   Not anxious nor agitated. Lab Data Reviewed:   Recent Labs     20  0625 20  0550   WBC 2.8* 2.9*   HGB 8.1* 8.5*   HCT 28.3* 29.9*   * 160     Recent Labs     20  0625 20  0453    132*   K 3.2* 3.6    99   CO2 22 26   BUN 6 15   CREA 0.52* 0.63*   GLU 99 110*   PHOS 1.2*  --    CA 7.1* 8.0*     Recent Labs     20  0453   SGOT 25   AP 61   TP 7.9   ALB 2.9*   GLOB 5.0*   LPSE 154       ________________________________________________________________________  Patient Active Problem List   Diagnosis Code    Complicated UTI (urinary tract infection) N39.0    TBI (traumatic brain injury) ([address] S06. 9X9A    Anemia D64.9    Renal insufficiency N28.9    Seizure (HCC) R56.9    GI bleed K92.2    SIRS (systemic inflammatory response syndrome) (HCC) R65.10         Assessment and Plan:  GI Bleeding:  No further coffee ground emesis or melena since admission. His hemoglobin has been stable.   - Ok to advance to diet per speech therapy recommendations  - IV PPI BID  - Monitor hemoglobin and transfuse to goal of 7.  - No NSAIDs.  - No plans for urgent endoscopy unless he begins actively bleeding.     Will continue to follow.          Signed By: Taylor Alfaro NP     1/30/2020  12:15 PM

## 2020-01-30 NOTE — PROGRESS NOTES
Nutrition Assessment:    RECOMMENDATIONS/INTERVENTION(S):   1. Continue Regular diet - texture/ liquid thickness per SLP. 2. Recommend magic cup TID (870 kcal, 27 gm protein) and yogurt with every meal to promote PO intake. 3. Monitor GI function, labs, and weight trends. ASSESSMENT:   1/30: 31 y/o M admitted with GIB. Pt screen for low BMI. PMH - TBI. Pt off Levo. Pt nonverbal at baseline, spoke with caregiver at bedside. At home, pt typically consumed chopped or puree foods based on consistency. Pt eats whatever caregivers/ family is eating but texture altered. Caregiver reports pt has had decreased intake x2 weeks 2/2 difficulties with chopped foods, did better with purees. Noted on previous visit pt received magic cup and yogurt with every meal, caregiver agreeable to continue in-house.  lb. BMI 16.3 c/w underweight. Nutritional needs +250 kcal to promote healthy weight. Caregiver reports pt has experienced weight loss 2/2 decreased intake. Pt with 9% weight loss x1 mo which is severe for timeframe. NKFA. No note of GI distress since admit. LBM 1/28. Skin intact. Labs - K+ 3.2 L, Cr 0.52 L, Ca 7.1 L, Phos 1.2 L. Meds -  ml/hr, pantoprazole, KPhos, KCl. Diet Order: NPO  % Eaten:  No data found. Pertinent Medications: [x] Reviewed    Labs: [x] Reviewed    Anthropometrics: Height: 6' 2.02\" (188 cm) Weight: 57.6 kg (127 lb)    IBW (%IBW):   ( ) UBW (%UBW):   (  %)      BMI: Body mass index is 16.3 kg/m². This BMI is indicative of:   [x] Underweight    [] Normal    [] Overweight    []  Obesity    []  Extreme Obesity (BMI>40)  Estimated Nutrition Needs (Based on): 2324 Kcals/day(REE 1596 x 1.3 - 250 kcal) , 65 g(58 - 69 gm (1.0 - 1.2 gm/kg)) Protein  Carbohydrate:  At Least 130 g/day  Fluids: 2324 mL/day (1 ml/kcal)    Last BM: 1/28   [x]Active     []Hyperactive  []Hypoactive       [] Absent   BS  Skin:    [x] Intact   [] Incision  [] Breakdown   [] DTI   [] Tears/Excoriation/Abrasion  []Edema [] Other:    Wt Readings from Last 30 Encounters:   01/29/20 57.6 kg (127 lb)   12/23/19 63.5 kg (140 lb)   08/23/19 63.7 kg (140 lb 6.9 oz)   07/30/19 68 kg (149 lb 14.6 oz)   07/23/19 68 kg (150 lb)   08/07/18 68 kg (150 lb)   05/01/18 65.8 kg (145 lb)   05/01/18 64.9 kg (143 lb)   02/14/17 64.9 kg (143 lb)      NUTRITION DIAGNOSES:   Problem:  Underweight     Etiology: related to decreased ability to consume sufficient energy     Signs/Symptoms: as evidenced by BMI 16.3       NUTRITION INTERVENTIONS:  Meals/Snacks: General/healthful diet   Supplements: Commercial supplement              GOAL:   PO intake >50% + ONS within 3-5 days    Cultural, Mandaen, or Ethnic Dietary Needs: None     EDUCATION & DISCHARGE NEEDS:    [x] None Identified   [] Identified and Education Provided/Documented   [] Identified and Pt declined/was not appropriate      [x] Interdisciplinary Care Plan Reviewed/Documented    [x] Discharge Needs:    Regular diet (texture/ liquid thickness per SLP)   [] No Nutrition Related Discharge Needs    NUTRITION RISK:   Pt Is At Nutrition Risk  [x]     No Nutrition Risk Identified  []       PT SEEN FOR:    []  MD Consult: []Calorie Count      []Diabetic Diet Education        []Diet Education     []Electrolyte Management     []General Nutrition Management and Supplements     []Management of Tube Feeding     []TPN Recommendations    []  RN Referral:  []MST score >=2     []Enteral/Parenteral Nutrition PTA     []Pregnant: Gestational DM or Multigestation                 [] Pressure Ulcer    [x]  Low BMI      []  Length of Stay       [] Dysphagia Diet         [] Ventilator  []  Follow-up     Previous Recommendations:   [] Implemented          [] Not Implemented          [x] Not Applicable    Previous Goal:   [] Met              [] Progressing Towards Goal              [] Not Progressing Towards Goal   [x] Not Applicable            Nazario White, [address] [place] [address]  Pager [phone]  Phone [phone]

## 2020-01-30 NOTE — PROGRESS NOTES
Problem: Pressure Injury - Risk of  Goal: *Prevention of pressure injury  Description  Document Bhupinder Scale and appropriate interventions in the flowsheet. 1/30/2020 0036 by Christo Lares RN  Outcome: Progressing Towards Goal  Note: Pressure Injury Interventions:  Sensory Interventions: Assess changes in LOC, Assess need for specialty bed, Check visual cues for pain, Float heels, Pressure redistribution bed/mattress (bed type), Suspension boots, Turn and reposition approx. every two hours (pillows and wedges if needed)    Moisture Interventions: Absorbent underpads, Check for incontinence Q2 hours and as needed, Assess need for specialty bed, Internal/External urinary devices    Activity Interventions: Assess need for specialty bed, Pressure redistribution bed/mattress(bed type)    Mobility Interventions: Assess need for specialty bed, Float heels, HOB 30 degrees or less, Pressure redistribution bed/mattress (bed type), Suspension boots, Turn and reposition approx. every two hours(pillow and wedges)    Nutrition Interventions: Document food/fluid/supplement intake, Discuss nutritional consult with provider    Friction and Shear Interventions: Apply protective barrier, creams and emollients, HOB 30 degrees or less, Lift team/patient mobility team, Transferring/repositioning devices             1/30/2020 0036 by Christo Lares RN  Outcome: Progressing Towards Goal  Note: Pressure Injury Interventions:  Sensory Interventions: Assess changes in LOC, Assess need for specialty bed, Check visual cues for pain, Float heels, Pressure redistribution bed/mattress (bed type), Suspension boots, Turn and reposition approx.  every two hours (pillows and wedges if needed)    Moisture Interventions: Absorbent underpads, Check for incontinence Q2 hours and as needed, Assess need for specialty bed, Internal/External urinary devices    Activity Interventions: Assess need for specialty bed, Pressure redistribution bed/mattress(bed type)    Mobility Interventions: Assess need for specialty bed, Float heels, HOB 30 degrees or less, Pressure redistribution bed/mattress (bed type), Suspension boots, Turn and reposition approx. every two hours(pillow and wedges)    Nutrition Interventions: Document food/fluid/supplement intake, Discuss nutritional consult with provider    Friction and Shear Interventions: Apply protective barrier, creams and emollients, HOB 30 degrees or less, Lift team/patient mobility team, Transferring/repositioning devices             1/30/2020 0035 by Tia Kong RN  Outcome: Progressing Towards Goal  Note: Pressure Injury Interventions:  Sensory Interventions: Assess changes in LOC, Assess need for specialty bed, Check visual cues for pain, Float heels, Pressure redistribution bed/mattress (bed type), Suspension boots, Turn and reposition approx. every two hours (pillows and wedges if needed)    Moisture Interventions: Absorbent underpads, Check for incontinence Q2 hours and as needed, Assess need for specialty bed, Internal/External urinary devices    Activity Interventions: Assess need for specialty bed, Pressure redistribution bed/mattress(bed type)    Mobility Interventions: Assess need for specialty bed, Float heels, HOB 30 degrees or less, Pressure redistribution bed/mattress (bed type), Suspension boots, Turn and reposition approx.  every two hours(pillow and wedges)    Nutrition Interventions: Document food/fluid/supplement intake, Discuss nutritional consult with provider    Friction and Shear Interventions: Apply protective barrier, creams and emollients, HOB 30 degrees or less, Lift team/patient mobility team, Transferring/repositioning devices                Problem: Patient Education: Go to Patient Education Activity  Goal: Patient/Family Education  1/30/2020 0036 by Tia Kong RN  Outcome: Progressing Towards Goal  1/30/2020 0036 by Tia Kong RN  Outcome: Progressing Towards Goal  1/30/2020 0035 by Tommy Wilkinson RN  Outcome: Progressing Towards Goal     Problem: Patient Education: Go to Patient Education Activity  Goal: Patient/Family Education  1/30/2020 0036 by Tommy Wilkinson RN  Outcome: Progressing Towards Goal  1/30/2020 0036 by Tommy Wilkinson RN  Outcome: Progressing Towards Goal  1/30/2020 0035 by Tommy Wilkinson RN  Outcome: Progressing Towards Goal     Problem: Sepsis: Day of Diagnosis  Goal: Off Pathway (Use only if patient is Off Pathway)  1/30/2020 0036 by Tommy Wilkinson RN  Outcome: Progressing Towards Goal  1/30/2020 0036 by Tommy Wilkinson RN  Outcome: Progressing Towards Goal  1/30/2020 0035 by Tommy Wilkinson RN  Outcome: Progressing Towards Goal  Goal: *Pneumococcal immunization (if eligible)  1/30/2020 0036 by Tommy Wilkinson RN  Outcome: Progressing Towards Goal  1/30/2020 0036 by Tommy Wilkinson RN  Outcome: Progressing Towards Goal  1/30/2020 0035 by Tommy Wilkinson RN  Outcome: Progressing Towards Goal  Goal: Activity/Safety  1/30/2020 0036 by Tommy Wilkinson RN  Outcome: Progressing Towards Goal  1/30/2020 0036 by Tommy Wilkinson RN  Outcome: Progressing Towards Goal  1/30/2020 0035 by Tommy Wilkinson RN  Outcome: Progressing Towards Goal  Goal: Consults, if ordered  1/30/2020 0036 by Tommy Wilkinson RN  Outcome: Progressing Towards Goal  1/30/2020 0036 by Tommy Wilkinson RN  Outcome: Progressing Towards Goal  1/30/2020 0035 by Tommy Wilkinson RN  Outcome: Progressing Towards Goal  Goal: Diagnostic Test/Procedures  1/30/2020 0036 by Tommy Wilkinson RN  Outcome: Progressing Towards Goal  1/30/2020 0036 by Tommy Wilkinson RN  Outcome: Progressing Towards Goal  1/30/2020 0035 by Tommy Wilkinson RN  Outcome: Progressing Towards Goal  Goal: Nutrition/Diet  1/30/2020 0036 by Tommy Wilkinson RN  Outcome: Progressing Towards Goal  1/30/2020 0036 by Tommy Wilkinson RN  Outcome: Progressing Towards Goal  1/30/2020 0035 by Tommy Wilkinson RN  Outcome: Progressing Towards Goal  Goal: Discharge Planning  1/30/2020 0036 by Eliana Wallace RN  Outcome: Progressing Towards Goal  1/30/2020 0036 by Eliana Wallace RN  Outcome: Progressing Towards Goal  1/30/2020 0035 by Eliana Wallace RN  Outcome: Progressing Towards Goal  Goal: Medications  1/30/2020 0036 by Eliana Wallace RN  Outcome: Progressing Towards Goal  1/30/2020 0036 by Eliana Wallace RN  Outcome: Progressing Towards Goal  1/30/2020 0035 by Eliana Wallace RN  Outcome: Progressing Towards Goal  Goal: Respiratory  1/30/2020 0036 by Eliana Wallace RN  Outcome: Progressing Towards Goal  1/30/2020 0036 by Eliana Wallace RN  Outcome: Progressing Towards Goal  1/30/2020 0035 by Eliana Wallace RN  Outcome: Progressing Towards Goal  Goal: Treatments/Interventions/Procedures  1/30/2020 0036 by Eliana Wallace RN  Outcome: Progressing Towards Goal  1/30/2020 0036 by Eliana Wallace RN  Outcome: Progressing Towards Goal  1/30/2020 0035 by Eliana Wallace RN  Outcome: Progressing Towards Goal  Goal: Psychosocial  1/30/2020 0036 by Eliana Wallace RN  Outcome: Progressing Towards Goal  1/30/2020 0036 by Eliana Wallace RN  Outcome: Progressing Towards Goal  1/30/2020 0035 by Eliana Wallace RN  Outcome: Progressing Towards Goal     Problem: Falls - Risk of  Goal: *Absence of Falls  Description  Document Jj Hover Fall Risk and appropriate interventions in the flowsheet.   Outcome: Progressing Towards Goal  Note: Fall Risk Interventions:       Mentation Interventions: Bed/chair exit alarm, Door open when patient unattended, Increase mobility, More frequent rounding, Reorient patient, Room close to nurse's station    Medication Interventions: Evaluate medications/consider consulting pharmacy, Bed/chair exit alarm    Elimination Interventions: Bed/chair exit alarm, Call light in reach, Toileting schedule/hourly rounds              Problem: Patient Education: Go to Patient Education Activity  Goal: Patient/Family Education  Outcome: Progressing Towards Goal     Problem: Seizure Disorder (Adult)  Goal: *STG: Remains free of seizure activity  Outcome: Progressing Towards Goal  Goal: *STG: Maintains lab values within therapeutic range  Outcome: Progressing Towards Goal  Goal: *STG/LTG: Complies with medication therapy  Outcome: Progressing Towards Goal  Goal: *STG: Remains free of injury during seizure activity  Outcome: Progressing Towards Goal  Goal: *STG: Remains safe in hospital  Outcome: Progressing Towards Goal  Goal: Interventions  Outcome: Progressing Towards Goal     Problem: Patient Education: Go to Patient Education Activity  Goal: Patient/Family Education  Outcome: Progressing Towards Goal     Problem: Patient Education: Go to Patient Education Activity  Goal: Patient/Family Education  Outcome: Progressing Towards Goal     Problem: Upper and Lower GI Bleed: Day 1  Goal: Off Pathway (Use only if patient is Off Pathway)  Outcome: Progressing Towards Goal  Goal: Activity/Safety  Outcome: Progressing Towards Goal  Goal: Consults, if ordered  Outcome: Progressing Towards Goal  Goal: Diagnostic Test/Procedures  Outcome: Progressing Towards Goal  Goal: Nutrition/Diet  Outcome: Progressing Towards Goal  Goal: Discharge Planning  Outcome: Progressing Towards Goal  Goal: Medications  Outcome: Progressing Towards Goal  Goal: Respiratory  Outcome: Progressing Towards Goal  Goal: Treatments/Interventions/Procedures  Outcome: Progressing Towards Goal  Goal: Psychosocial  Outcome: Progressing Towards Goal  Goal: *Optimal pain control at patient's stated goal  Outcome: Progressing Towards Goal  Goal: *Hemodynamically stable  Outcome: Progressing Towards Goal  Goal: *Demonstrates progressive activity  Outcome: Progressing Towards Goal

## 2020-01-30 NOTE — PROGRESS NOTES
Problem: Dysphagia (Adult)  Goal: *Acute Goals and Plan of Care (Insert Text)  Description  Swallowing goals initiated 1-30-20:  1)  Tolerate dysphagia 1, nectars without s/s aspiration by 2-6-20   Outcome: Progressing Towards Goal   SPEECH LANGUAGE PATHOLOGY BEDSIDE SWALLOW EVALUATION  Patient: Kaya Echeverria (70 y.o. male)  Date: 1/30/2020  Primary Diagnosis: GI bleed [K92.2]        Precautions: aspiration       ASSESSMENT :  Based on the objective data described below, the patient presents with baseline swallow skills for dysphagia 1, nectars. Admitted with possible GI bleed, sz. PMH: TBI long standing, sz. .    Patient will benefit from skilled intervention to address the above impairments. Patients rehabilitation potential is considered to be Good     PLAN :  Recommendations and Planned Interventions:  Start dysphagia 1, nectars. Frequency/Duration: Patient will be followed by speech-language pathology 2 times a week to address goals. Discharge Recommendations: home with family and caregivers     SUBJECTIVE:   Patient interested in PO and alert, caregiver present.  .    OBJECTIVE:     Past Medical History:   Diagnosis Date    Anemia 7/28/2019    History of vascular access device 01/29/2020    5F DOUBLE LUMEN PICC PLACEC BY ROSA MCGARRY RN R BRACHIAL 38 CM, NO DIFFICULTY    Neurological disorder     traumatic brain injury    Seizures (HCC)     TBI (traumatic brain injury) ([facility] [address]      Past Surgical History:   Procedure Laterality Date    HX CRANIOTOMY       Prior Level of Function/Home Situation:   Home Situation  Home Environment: Private residence  One/Two Story Residence: Other (Comment)  # of Interior Steps: 0  Lift Chair Available: Yes  Living Alone: No  Support Systems: Family member(s), Home care staff  Patient Expects to be Discharged to[de-identified] Private residence  Current DME Used/Available at Home: Adaptive bathing aides, Adaptive dressing aides, Adaptive feeding aides, Blood pressure cuff, Commode, bedside, Lift chair, Hospital bed, Raised toilet seat, Safety frame toliet, Shower chair, Wheelchair, Wheelchair, power  Diet prior to admission: purees, nectars  Current Diet:  NPO   Cognitive and Communication Status:  Neurologic State: Alert  Orientation Level: Unable to verbalize  Cognition: Unable to assess (comment)           Oral Assessment:     P.O. Trials:  Patient Position: upright  but head tilted/turned to L as per usual  Vocal quality prior to P.O.: Aphonic  Consistency Presented: Puree;Nectar thick liquid  How Presented: SLP-fed/presented;Spoon;Straw;Successive swallows   ORAL PHASE:   Bolus Acceptance: No impairment  Bolus Formation/Control: (delayed oral leakage due to head turn and droop)     Propulsion: No impairment  Oral Residue: None  PHARYNGEAL PHASE:   Initiation of Swallow: No impairment  Laryngeal Elevation: Functional  Aspiration Signs/Symptoms: None    Caregiver reports that he used to be on mech soft,nectars after diet advancement with SLP. About 2 weeks ago, he started oral holding and drooling foods without chewing. He lost weight, so diet downgraded to dyspahgia 1, nectasrs. NOMS:   The NOMS functional outcome measure was used to quantify this patient's level of swallowing impairment. Based on the NOMS, the patient was determined to be at level 4 for swallow function       NOMS Swallowing Levels:  Level 1 (CN): NPO  Level 2 (CM): NPO but takes consistency in therapy  Level 3 (CL): Takes less than 50% of nutrition p.o. and continues with nonoral feedings; and/or safe with mod cues; and/or max diet restriction  Level 4 (CK):  Safe swallow but needs mod cues; and/or mod diet restriction; and/or still requires some nonoral feeding/supplements  Level 5 (CJ): Safe swallow with min diet restriction; and/or needs min cues  Level 6 (CI): Independent with p.o.; rare cues; usually self cues; may need to avoid some foods or needs extra time  Level 7 [facility]): Independent for all p.o.  CARINA. (2003). National Outcomes Measurement System (NOMS): Adult Speech-Language Pathology User's Guide. Pain:  Pain Scale 1: Adult Nonverbal Pain Scale          After treatment:   Nursing notified and Caregiver / family present    COMMUNICATION/EDUCATION:   Patient was educated regarding his deficit(s) of dysphagia as this relates to his diagnosis of sz. He demonstrated Guarded understanding as evidenced by lack of verbal response. Caregiver present and understood. .    The patient's plan of care including recommendations, planned interventions, and recommended diet changes were discussed with: Registered Nurse and Physician. Caregiver participated as able in goal setting and plan of care. Patient is unable to participate in goal setting and plan of care.     Thank you for this referral.  CATHLEEN Vora  Time Calculation: 15 mins

## 2020-01-30 NOTE — PROGRESS NOTES
Spiritual Care Assessment/Progress Note  Marcella Murrell      NAME: Tom Rodriguez      MRN: [id number]  AGE: 30 y.o. SEX: male  Buddhist Affiliation: Mu-ism   Language: English     1/30/2020     Total Time (in minutes): 10     Spiritual Assessment begun in [facility] INTENS[facility] through conversation with:         [x]Patient        [] Family    [x] Friend(s)        Reason for Consult: Palliative Care, Initial/Spiritual Assessment     Spiritual beliefs: (Please include comment if needed)     [x] Identifies with a alley tradition:         [] Supported by a alley community:            [] Claims no spiritual orientation:           [] Seeking spiritual identity:                [] Adheres to an individual form of spirituality:           [] Not able to assess:                           Identified resources for coping:      [x] Prayer                               [] Music                  [] Guided Imagery     [x] Family/friends                 [] Pet visits     [] Devotional reading                         [] Unknown     [] Other:                                             Interventions offered during this visit: (See comments for more details)    Patient Interventions: Affirmation of emotions/emotional suffering     Family/Friend(s):  Affirmation of emotions/emotional suffering, Affirmation of alley, Prayer (assurance of)     Plan of Care:     [] Support spiritual and/or cultural needs    [] Support AMD and/or advance care planning process      [] Support grieving process   [] Coordinate Rites and/or Rituals    [] Coordination with community clergy   [] No spiritual needs identified at this time   [] Detailed Plan of Care below (See Comments)  [] Make referral to Music Therapy  [] Make referral to Pet Therapy     [] Make referral to Addiction services  [] Make referral to [facility]  [] Make referral to Spiritual Care Partner  [] No future visits requested        [x] Follow up visits as needed Comments:      visited patient, Elayne Acuña, for a palliative care initial spiritual assessment in the ICU. Elayne Acuña is non-verbal, but was awake and made good eye contact when the  came into the room. His hired caregiver, Kimberly, was sitting at the bedside. No family members where present at this time.  introduced herself to [employer] and extended support. Elayne Acuña made good eye contact with the  and appeared to acknowledge the 's voice.  provided supportive presence at the bedside.  inquired how she could best support Elayne Acuña at this time and Kimberly requested  keep Elayne Acuña and family in prayer.  provided assurance. Advised of 's availability.  will follow up as able and/or needed  Superprotonic. Adeel Kemp.      Paging Service: Shi-CHARLY ([phone])

## 2020-01-31 ENCOUNTER — APPOINTMENT (OUTPATIENT)
Dept: GENERAL RADIOLOGY | Age: 35
DRG: 871 | End: 2020-01-31
Attending: INTERNAL MEDICINE
Payer: MEDICARE

## 2020-01-31 ENCOUNTER — APPOINTMENT (OUTPATIENT)
Dept: ULTRASOUND IMAGING | Age: 35
DRG: 871 | End: 2020-01-31
Attending: INTERNAL MEDICINE
Payer: MEDICARE

## 2020-01-31 PROBLEM — J18.9 PNEUMONIA: Status: ACTIVE | Noted: 2020-01-31

## 2020-01-31 PROBLEM — A41.9 SEPTIC SHOCK (HCC): Status: ACTIVE | Noted: 2020-01-31

## 2020-01-31 PROBLEM — R65.21 SEPTIC SHOCK (HCC): Status: ACTIVE | Noted: 2020-01-31

## 2020-01-31 LAB
ANION GAP SERPL CALC-SCNC: 7 MMOL/L (ref 5–15)
APPEARANCE FLD: ABNORMAL
BODY FLD TYPE: NORMAL
BUN SERPL-MCNC: 12 MG/DL (ref 6–20)
BUN/CREAT SERPL: 31 (ref 12–20)
CALCIUM SERPL-MCNC: 7.5 MG/DL (ref 8.5–10.1)
CHLORIDE SERPL-SCNC: 107 MMOL/L (ref 97–108)
CO2 SERPL-SCNC: 23 MMOL/L (ref 21–32)
COLOR FLD: ABNORMAL
CREAT SERPL-MCNC: 0.39 MG/DL (ref 0.7–1.3)
ERYTHROCYTE [DISTWIDTH] IN BLOOD BY AUTOMATED COUNT: 19.2 % (ref 11.5–14.5)
GLUCOSE SERPL-MCNC: 111 MG/DL (ref 65–100)
HCT VFR BLD AUTO: 27.5 % (ref 36.6–50.3)
HGB BLD-MCNC: 7.7 G/DL (ref 12.1–17)
LYMPHOCYTES NFR FLD: 87 %
MAGNESIUM SERPL-MCNC: 2.1 MG/DL (ref 1.6–2.4)
MCH RBC QN AUTO: 19.2 PG (ref 26–34)
MCHC RBC AUTO-ENTMCNC: 28 G/DL (ref 30–36.5)
MCV RBC AUTO: 68.4 FL (ref 80–99)
MONOS+MACROS NFR FLD: 9 %
NEUTROPHILS NFR FLD: 4 %
NRBC # BLD: 0 K/UL (ref 0–0.01)
NRBC BLD-RTO: 0 PER 100 WBC
NUC CELL # FLD: 131 /CU MM
PH FLD: 7 [PH]
PHOSPHATE SERPL-MCNC: 1.7 MG/DL (ref 2.6–4.7)
PLATELET # BLD AUTO: ABNORMAL K/UL (ref 150–400)
POTASSIUM SERPL-SCNC: 3.7 MMOL/L (ref 3.5–5.1)
PROCALCITONIN SERPL-MCNC: <0.05 NG/ML
RBC # BLD AUTO: 4.02 M/UL (ref 4.1–5.7)
RBC # FLD: >100 /CU MM
SODIUM SERPL-SCNC: 137 MMOL/L (ref 136–145)
SPECIMEN SOURCE FLD: ABNORMAL
WBC # BLD AUTO: 6.5 K/UL (ref 4.1–11.1)

## 2020-01-31 PROCEDURE — 0W9930Z DRAINAGE OF RIGHT PLEURAL CAVITY WITH DRAINAGE DEVICE, PERCUTANEOUS APPROACH: ICD-10-PCS | Performed by: STUDENT IN AN ORGANIZED HEALTH CARE EDUCATION/TRAINING PROGRAM

## 2020-01-31 PROCEDURE — 87116 MYCOBACTERIA CULTURE: CPT

## 2020-01-31 PROCEDURE — 36415 COLL VENOUS BLD VENIPUNCTURE: CPT

## 2020-01-31 PROCEDURE — 85027 COMPLETE CBC AUTOMATED: CPT

## 2020-01-31 PROCEDURE — 74011250637 HC RX REV CODE- 250/637: Performed by: INTERNAL MEDICINE

## 2020-01-31 PROCEDURE — 89050 BODY FLUID CELL COUNT: CPT

## 2020-01-31 PROCEDURE — 74011000250 HC RX REV CODE- 250: Performed by: STUDENT IN AN ORGANIZED HEALTH CARE EDUCATION/TRAINING PROGRAM

## 2020-01-31 PROCEDURE — 74011250636 HC RX REV CODE- 250/636: Performed by: INTERNAL MEDICINE

## 2020-01-31 PROCEDURE — 84100 ASSAY OF PHOSPHORUS: CPT

## 2020-01-31 PROCEDURE — 77030011229 HC DIL VESL COON COOK -A

## 2020-01-31 PROCEDURE — C9113 INJ PANTOPRAZOLE SODIUM, VIA: HCPCS | Performed by: INTERNAL MEDICINE

## 2020-01-31 PROCEDURE — 65610000006 HC RM INTENSIVE CARE

## 2020-01-31 PROCEDURE — 71045 X-RAY EXAM CHEST 1 VIEW: CPT

## 2020-01-31 PROCEDURE — 88112 CYTOPATH CELL ENHANCE TECH: CPT

## 2020-01-31 PROCEDURE — 77030003666 HC NDL SPINAL BD -A

## 2020-01-31 PROCEDURE — 87205 SMEAR GRAM STAIN: CPT

## 2020-01-31 PROCEDURE — 87015 SPECIMEN INFECT AGNT CONCNTJ: CPT

## 2020-01-31 PROCEDURE — 84157 ASSAY OF PROTEIN OTHER: CPT

## 2020-01-31 PROCEDURE — 74011000258 HC RX REV CODE- 258: Performed by: INTERNAL MEDICINE

## 2020-01-31 PROCEDURE — 74011000250 HC RX REV CODE- 250: Performed by: INTERNAL MEDICINE

## 2020-01-31 PROCEDURE — 74011250636 HC RX REV CODE- 250/636: Performed by: EMERGENCY MEDICINE

## 2020-01-31 PROCEDURE — 74011000258 HC RX REV CODE- 258: Performed by: EMERGENCY MEDICINE

## 2020-01-31 PROCEDURE — C1769 GUIDE WIRE: HCPCS

## 2020-01-31 PROCEDURE — 83986 ASSAY PH BODY FLUID NOS: CPT

## 2020-01-31 PROCEDURE — 82945 GLUCOSE OTHER FLUID: CPT

## 2020-01-31 PROCEDURE — 84145 PROCALCITONIN (PCT): CPT

## 2020-01-31 PROCEDURE — 83735 ASSAY OF MAGNESIUM: CPT

## 2020-01-31 PROCEDURE — 76942 ECHO GUIDE FOR BIOPSY: CPT

## 2020-01-31 PROCEDURE — 80048 BASIC METABOLIC PNL TOTAL CA: CPT

## 2020-01-31 PROCEDURE — 83615 LACTATE (LD) (LDH) ENZYME: CPT

## 2020-01-31 PROCEDURE — 77030013160 HC PROTCT ARM THMB DERY -A

## 2020-01-31 PROCEDURE — 88305 TISSUE EXAM BY PATHOLOGIST: CPT

## 2020-01-31 PROCEDURE — 77030003630 HC NDL PERC VASC COOK -B

## 2020-01-31 PROCEDURE — C1729 CATH, DRAINAGE: HCPCS

## 2020-01-31 PROCEDURE — 87102 FUNGUS ISOLATION CULTURE: CPT

## 2020-01-31 PROCEDURE — 77030014115

## 2020-01-31 RX ORDER — MAGNESIUM SULFATE HEPTAHYDRATE 40 MG/ML
2 INJECTION, SOLUTION INTRAVENOUS ONCE
Status: COMPLETED | OUTPATIENT
Start: 2020-01-31 | End: 2020-01-31

## 2020-01-31 RX ORDER — DOCUSATE SODIUM 100 MG/1
100 CAPSULE, LIQUID FILLED ORAL 2 TIMES DAILY
Status: DISCONTINUED | OUTPATIENT
Start: 2020-01-31 | End: 2020-02-06 | Stop reason: HOSPADM

## 2020-01-31 RX ORDER — VANCOMYCIN/0.9 % SOD CHLORIDE 1.5G/250ML
1500 PLASTIC BAG, INJECTION (ML) INTRAVENOUS ONCE
Status: COMPLETED | OUTPATIENT
Start: 2020-01-31 | End: 2020-01-31

## 2020-01-31 RX ORDER — LIDOCAINE HYDROCHLORIDE 10 MG/ML
10 INJECTION, SOLUTION EPIDURAL; INFILTRATION; INTRACAUDAL; PERINEURAL
Status: COMPLETED | OUTPATIENT
Start: 2020-01-31 | End: 2020-01-31

## 2020-01-31 RX ORDER — SENNOSIDES 8.6 MG/1
1 TABLET ORAL DAILY
Status: DISCONTINUED | OUTPATIENT
Start: 2020-01-31 | End: 2020-02-06 | Stop reason: HOSPADM

## 2020-01-31 RX ORDER — FACIAL-BODY WIPES
10 EACH TOPICAL DAILY PRN
Status: DISCONTINUED | OUTPATIENT
Start: 2020-01-31 | End: 2020-02-06 | Stop reason: HOSPADM

## 2020-01-31 RX ORDER — METRONIDAZOLE 500 MG/100ML
500 INJECTION, SOLUTION INTRAVENOUS EVERY 12 HOURS
Status: DISCONTINUED | OUTPATIENT
Start: 2020-01-31 | End: 2020-02-04

## 2020-01-31 RX ADMIN — MAGNESIUM SULFATE HEPTAHYDRATE 2 G: 40 INJECTION, SOLUTION INTRAVENOUS at 08:49

## 2020-01-31 RX ADMIN — LEVOFLOXACIN 750 MG: 5 INJECTION, SOLUTION INTRAVENOUS at 07:09

## 2020-01-31 RX ADMIN — LIDOCAINE HYDROCHLORIDE 10 ML: 10 INJECTION, SOLUTION EPIDURAL; INFILTRATION; INTRACAUDAL; PERINEURAL at 15:00

## 2020-01-31 RX ADMIN — CEFEPIME HYDROCHLORIDE 2 G: 2 INJECTION, POWDER, FOR SOLUTION INTRAVENOUS at 23:07

## 2020-01-31 RX ADMIN — DOCUSATE SODIUM 100 MG: 100 CAPSULE, LIQUID FILLED ORAL at 12:55

## 2020-01-31 RX ADMIN — DANTROLENE SODIUM 100 MG: 25 CAPSULE ORAL at 18:03

## 2020-01-31 RX ADMIN — NOREPINEPHRINE BITARTRATE 0.5 MCG/MIN: 1 INJECTION, SOLUTION, CONCENTRATE INTRAVENOUS at 06:25

## 2020-01-31 RX ADMIN — Medication 1500 MG: at 08:45

## 2020-01-31 RX ADMIN — LACOSAMIDE 200 MG: 100 TABLET, FILM COATED ORAL at 08:40

## 2020-01-31 RX ADMIN — CEFEPIME HYDROCHLORIDE 2 G: 2 INJECTION, POWDER, FOR SOLUTION INTRAVENOUS at 16:58

## 2020-01-31 RX ADMIN — PANTOPRAZOLE SODIUM 40 MG: 40 INJECTION, POWDER, FOR SOLUTION INTRAVENOUS at 08:40

## 2020-01-31 RX ADMIN — Medication 10 ML: at 22:12

## 2020-01-31 RX ADMIN — BROMOCRIPTINE MESYLATE 5 MG: 2.5 TABLET ORAL at 20:29

## 2020-01-31 RX ADMIN — BROMOCRIPTINE MESYLATE 5 MG: 2.5 TABLET ORAL at 16:56

## 2020-01-31 RX ADMIN — Medication 10 ML: at 06:28

## 2020-01-31 RX ADMIN — DANTROLENE SODIUM 100 MG: 25 CAPSULE ORAL at 08:40

## 2020-01-31 RX ADMIN — METRONIDAZOLE 500 MG: 500 INJECTION, SOLUTION INTRAVENOUS at 20:29

## 2020-01-31 RX ADMIN — DANTROLENE SODIUM 100 MG: 25 CAPSULE ORAL at 16:56

## 2020-01-31 RX ADMIN — DANTROLENE SODIUM 100 MG: 25 CAPSULE ORAL at 20:29

## 2020-01-31 RX ADMIN — CEFEPIME HYDROCHLORIDE 2 G: 2 INJECTION, POWDER, FOR SOLUTION INTRAVENOUS at 06:27

## 2020-01-31 RX ADMIN — LACOSAMIDE 200 MG: 100 TABLET, FILM COATED ORAL at 18:03

## 2020-01-31 RX ADMIN — DOCUSATE SODIUM 100 MG: 100 CAPSULE, LIQUID FILLED ORAL at 18:03

## 2020-01-31 RX ADMIN — VANCOMYCIN HYDROCHLORIDE 1000 MG: 1 INJECTION, POWDER, LYOPHILIZED, FOR SOLUTION INTRAVENOUS at 18:02

## 2020-01-31 RX ADMIN — METRONIDAZOLE 500 MG: 500 INJECTION, SOLUTION INTRAVENOUS at 11:11

## 2020-01-31 RX ADMIN — SENNOSIDES 8.6 MG: 8.6 TABLET, FILM COATED ORAL at 12:55

## 2020-01-31 RX ADMIN — BROMOCRIPTINE MESYLATE 5 MG: 2.5 TABLET ORAL at 18:03

## 2020-01-31 RX ADMIN — PANTOPRAZOLE SODIUM 40 MG: 40 INJECTION, POWDER, FOR SOLUTION INTRAVENOUS at 20:29

## 2020-01-31 RX ADMIN — VANCOMYCIN HYDROCHLORIDE 1000 MG: 1 INJECTION, POWDER, LYOPHILIZED, FOR SOLUTION INTRAVENOUS at 23:07

## 2020-01-31 RX ADMIN — TRAZODONE HYDROCHLORIDE 100 MG: 100 TABLET ORAL at 22:12

## 2020-01-31 RX ADMIN — SODIUM CHLORIDE 100 ML/HR: 900 INJECTION, SOLUTION INTRAVENOUS at 08:49

## 2020-01-31 RX ADMIN — BROMOCRIPTINE MESYLATE 5 MG: 2.5 TABLET ORAL at 08:40
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
## 2020-01-31 NOTE — PROGRESS NOTES
1900: Bedside and Verbal shift change report given to MELVIN Yip (oncoming nurse) by Shona Nielsen RN (offgoing nurse). Report included the following information SBAR, Kardex, ED Summary, Procedure Summary, Intake/Output, Recent Results, Cardiac Rhythm Sinus Tachycardia and Quality Measures. Primary Nurse Ara Varghese RN and Shona Nielsen RN performed a dual skin assessment on this patient No impairment noted  Bhupinder score is 9.    2000: Shift  Assessment completed, No s/s of distress noted at this time. Mental Status: Patient is alert and nonverbal at baseline, patient nods appropriately at this time. Respiratory: Patient on room air. Coarse lung sound noted with rhonchi noted at this time. Cardiac: Sinus tachycardia noted at this time. 100s-120s            GI/: Active bowel sounds noted at this time. IV Drips: NS infusing at 100mL/hr. Patient turned and repositioned at this time. Family at bedside and has been updated on plan of care at this time. 2110: Medications administered at this time. No s/s of distress noted at this time. 2200: Patient turned and repositioned at this time. Caregiver at bedside at this time. 2327: MRSA results positive at this time. Contact precautions placed at this time. 2347: Medications administered at this time. 0000: Reassessment completed. No changes from previous assessment, No s/s of distress noted at this time. Patient turned and repositioned at this time. Caregiver remains at bedside. 0200: Bath completed at this time, patient turned and repositioned. 0300: No s/s of distress noted at this time. 0400: Reassessment completed. No changes from previous assessment, No s/s of distress noted at this time and patient turned and repositioned at this time. Caregiver remains at bedside. Labs drawn and sent. 0500: No s/s of distress noted. 4277: Levophed gtt started at 0.5 mcg/min at this time.     1111: Levophed gtt increased to 2 mcg/min at this time. 0700: Bedside and Verbal shift change report given to [facility]IL[facility] S[facility]IE TONG RN (oncoming nurse) by Theodora RN (offgoing nurse). Report included the following information SBAR, Kardex, ED Summary, Procedure Summary, Intake/Output, Recent Results, Cardiac Rhythm Sinus Tachycardia and Quality Measures.

## 2020-01-31 NOTE — PROGRESS NOTES
[address] NP  [phone]           GI PROGRESS NOTE        NAME: Chintan Noyola   :  1985   MRN:  [id number]       Subjective:   Non Verbal.  Nursing reports no further melena or vomiting. Objective:   NAD    VITALS:   Last 24hrs VS reviewed since prior progress note. Most recent are:  Visit Vitals  /59   Pulse 87   Temp 98.4 °F (36.9 °C)   Resp 13   Ht 6' 2.02\" (1.88 m)   Wt 62.3 kg (137 lb 5.6 oz)   SpO2 93%   BMI 17.63 kg/m²       Intake/Output Summary (Last 24 hours) at 2020 1329  Last data filed at 2020 1200  Gross per 24 hour   Intake 1878.33 ml   Output 3825 ml   Net -1946.67 ml       PHYSICAL EXAM:  General: Alert, in no acute distress    HEENT: Anicteric sclerae. Lungs:            CTA Bilaterally. Heart:  Regular  rhythm,    Abdomen: Soft, Non distended, Non tender.  (+)Bowel sounds, no HSM  Extremities: No c/c/e  Neurologic:  CN 2-12 gi, Alert and oriented X 3. No acute neurological distress   Psych:   Good insight. Not anxious nor agitated. Lab Data Reviewed:   Recent Labs     20  0359 20  0625   WBC 6.5 2.8*   HGB 7.7* 8.1*   HCT 27.5* 28.3*   PLT UNABLE TO REPORT ACCURATE COUNT DUE TO PLATELET AGGREGATION, HOWEVER, PLATELETS APPEAR DECREASED IN NUMBER ON SMEAR. PLEASE RESUBMIT SODIUM CITRATE (BLUE) AND EDTA (LAVENDAR) TUBES FOR HEMATOLOGICAL TESTING. 134*     Recent Labs     20  0359 20  0625    138   K 3.7 3.2*    108   CO2 23 22   BUN 12 6   CREA 0.39* 0.52*   * 99   PHOS 1.7* 1.2*   CA 7.5* 7.1*     Recent Labs     20  0453   SGOT 25   AP 61   TP 7.9   ALB 2.9*   GLOB 5.0*   LPSE 154       ________________________________________________________________________  Patient Active Problem List   Diagnosis Code    Complicated UTI (urinary tract infection) N39.0    [facility] S06. 9X9A    Anemia D64.9    Renal insufficiency N28.9    Seizure (HCC) R56.9  GI bleed K92.2    Pneumonia J18.9    Septic shock (HCC) A41.9, R65.21         Assessment and Plan:  GI Bleeding:  No further coffee ground emesis or melena since admission.    - Diet per speech therapy  - IV PPI BID  - Monitor hemoglobin and transfuse to goal of 7.  - No NSAIDs.  - No plans for urgent endoscopy unless he begins actively bleeding. Will see as needed over the weekend. Please contact the on call GI provider with any questions or concerns.        Signed By: Felice Peñaloza NP     1/31/2020  1:29 PM
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
## 2020-01-31 NOTE — ROUTINE PROCESS
Bedside shift change received from Joey, [facility] and care assumed. Report included the following information SBAR, Kardex, Intake/Output, MAR, Accordion, Recent Results, Med Rec Status and Cardiac Rhythm NSR/ST. 0800: Assessment completed,vss.  see chart. Personal caregiver at bedside. 1200: Reassessment completed, vss. See chart, care giver remains at bedside. 1600: reassessment completed, vss, see chart. New chest drain noted to posterior R lower lung, Pt remains on Levo see MAR. Caregiver and Parents at bedside. 1920: Bedside and Verbal shift change report given to Joey RN (oncoming nurse) by this RN (offgoing nurse). Report included the following information SBAR, Kardex, Procedure Summary, Intake/Output, MAR, Recent Results and Cardiac Rhythm NSR/ST. Care hadned off at this time. Disla Milton

## 2020-01-31 NOTE — PROGRESS NOTES
[facility]     Name: Jimmy Mcpherson MRN: [id number]   : 1985 Hospital: [address]   Date: 2020        Impression Plan   1. Septic shock  2. Leukopenia  3. Fevers  4. Active seizures with a hx of seizure disorder  5. Traumatic brain injury               · Decrease IV fluids to 50 ml/hr  · Levophed for MAP >65  · Discussed case with ABRIL- Dr. Vi Carrion. Will try to place a pigtail in the right pleural space. · Cefepime/levoflox/Vanc. Will add flagyl. · Blood cultures NGTD  · lovenox proph  · Famotidine  · Discussed plan with mother who is agreeable           Pt is critically ill. Critical care time spent with pt exclusive of procedures was 37 minutes. Pt at risk for further decline due to septic shock. Radiology  ( personally reviewed) CXR reviewed: small chronic right pleural effusion, otherwise clear. ABG No results for input(s): PHI, PO2I, PCO2I in the last 72 hours. Subjective     Cc: hypotension    31 yo with PMHx of TBI presenting with fevers/seizures. Per pts father pt started having fevers over the weekend. Increasingly more lethargic and overnight had long seizures lasting about 10 min. Pt has had ativan doses to break seizures at this point, but father reports him being lethargic before this. Had some vomiting leading up to this as well. No diarrhea. Pt has issues with chronic constipation. Had a caretaker that was sick 2 weeks ago, otherwise no sick contacts. Last hospitalized  with similar sxs. All cultures neg at that time. Overnight events:  Afebrile  awake and interactive  CT chest with possible empyema in RLL with ? Beginning stages of lung necrosis.    On levophed 2 mcg/min  I/O: 3384/2900 +484        Past Medical History:   Diagnosis Date    Anemia 2019    History of vascular access device 2020    5F DOUBLE LUMEN PICC PLACEC BY ROSA MCGARRY RN R BRACHIAL 38 CM, NO DIFFICULTY    Neurological disorder     traumatic brain injury    Se[facility] ([facility] [address]     TBI (traumatic brain injury) ([facility] [address]       Past Surgical History:   Procedure Laterality Date    HX CRANIOTOMY        Prior to Admission medications    Medication Sig Start Date End Date Taking? Authorizing Provider   traZODone (DESYREL) 100 mg tablet Take 100 mg by mouth nightly. Yes Provider, Historical   lacosamide (VIMPAT) 200 mg tab tablet Take 1 Tab by mouth two (2) times a day. Max Daily Amount: 400 mg. 12/26/19  Yes Maria Eugenia Jones MD   diazePAM (VALIUM) 5 mg tablet Take 0.5 Tabs by mouth every eight (8) hours as needed (for excssive spasticity or seizure). Max Daily Amount: 7.5 mg. 12/26/19  Yes Maria Eugenia Jones MD   aspirin-acetaminophen-caffeine [facility]) [phone] mg per tablet Take 2 Tabs by mouth every six (6) hours as needed for Pain or Headache. Do not use with ibuprofen   Yes Provider, Historical   cholecalciferol, vitamin D3, (VITAMIN D3) 2,000 unit tab Take 2,000 Units by mouth daily. Yes Provider, Historical   omega 3-DHA-EPA-fish oil 1,000 mg (120 mg-180 mg) capsule Take 1 Cap by mouth every evening. Yes Provider, Historical   Lactobacillus acidophilus (ACIDOPHILUS) cap Take 1 Cap by mouth daily. Before morning turn   Yes Provider, Historical   bromocriptine mesylate (BROMOCRIPTINE PO) Take 5 mg by mouth four (4) times daily. Patient takes at 8am, 2pm, 6pm, 8pm   Yes Provider, Historical   dantrolene (DANTRIUM) 100 mg capsule Take 100 mg by mouth four (4) times daily. Patient takes at 8am, 2pm, 6pm, 8pm   Yes Provider, Historical   senna (SENNA) 8.6 mg tablet Take 2 Tabs by mouth nightly. Yes Provider, Historical   pantoprazole (PROTONIX) 40 mg tablet Take 40 mg by mouth daily. Before morning turn   Yes Provider, Historical   ibuprofen (MOTRIN) 600 mg tablet Take 600 mg by mouth every four (4) hours as needed for Pain. Yes Provider, Historical   raNITIdine (ZANTAC) 150 mg tablet Take 150 mg by mouth two (2) times daily as needed for Indigestion. Yes Provider, Historical   bisacodyl (DULCOLAX) 10 mg suppository Insert 10 mg into rectum daily as needed (constipation). 7/31/19  Yes Hugo Rivera MD   docusate sodium 100 mg tab Take 100 mg by mouth nightly. 7/31/19  Yes Hugo Rivera MD     Current Facility-Administered Medications   Medication Dose Route Frequency    magnesium sulfate 2 g/50 ml IVPB (premix or compounded)  2 g IntraVENous ONCE    vancomycin (VANCOCIN) 1500 mg in  ml infusion  1,500 mg IntraVENous ONCE    vancomycin (VANCOCIN) 1,000 mg in 0.9% sodium chloride (MBP/ADV) 250 mL  1,000 mg IntraVENous Q8H    [START ON 2/1/2020] Vancomycin trough 2/1 prior to 0800 dose   Other ONCE    metroNIDAZOLE (FLAGYL) IVPB premix 500 mg  500 mg IntraVENous Q12H    sodium chloride (NS) flush 5-40 mL  5-40 mL IntraVENous Q8H    levoFLOXacin (LEVAQUIN) 750 mg in D5W IVPB  750 mg IntraVENous Q24H    pantoprazole (PROTONIX) 40 mg in 0.9% sodium chloride 10 mL injection  40 mg IntraVENous Q12H    0.9% sodium chloride infusion  100 mL/hr IntraVENous CONTINUOUS    NOREPINephrine (LEVOPHED) 8,000 mcg in dextrose 5% 250 mL infusion  0.5-16 mcg/min IntraVENous TITRATE    bromocriptine (PARLODEL) tablet 5 mg  5 mg Oral QID    dantrolene (DANTRIUM) capsule 100 mg  100 mg Oral QID    docusate sodium (COLACE) capsule 100 mg  100 mg Oral QHS    lacosamide (VIMPAT) tablet 200 mg  200 mg Oral BID    traZODone (DESYREL) tablet 100 mg  100 mg Oral QHS    cefepime (MAXIPIME) 2 g in 0.9% sodium chloride (MBP/ADV) 100 mL  2 g IntraVENous Q8H     No Known Allergies   Social History     Tobacco Use    Smoking status: Never Smoker    Smokeless tobacco: Never Used   Substance Use Topics    Alcohol use: No      Family History   Problem Relation Age of Onset    No Known Problems Other         reviewed. Patient does not know          Laboratory: I have personally reviewed the critical care flowsheet and labs.      Recent Labs     01/31/20  6725 01/30/20  0625 01/29/20  0550   WBC 6.5 2.8* 2.9*   HGB 7.7* 8.1* 8.5*   HCT 27.5* 28.3* 29.9*   PLT UNABLE TO REPORT ACCURATE COUNT DUE TO PLATELET AGGREGATION, HOWEVER, PLATELETS APPEAR DECREASED IN NUMBER ON SMEAR. PLEASE RESUBMIT SODIUM CITRATE (BLUE) AND EDTA (LAVENDAR) TUBES FOR HEMATOLOGICAL TESTING. 134* 160     Recent Labs     01/31/20  0359 01/30/20  0625 01/29/20  0453    138 132*   K 3.7 3.2* 3.6    108 99   CO2 23 22 26   * 99 110*   BUN 12 6 15   CREA 0.39* 0.52* 0.63*   CA 7.5* 7.1* 8.0*   MG 2.1 2.0  --    PHOS 1.7* 1.2*  --    ALB  --   --  2.9*   SGOT  --   --  25   ALT  --   --  19       Objective:     Mode Rate Tidal Volume Pressure FiO2 PEEP                    Vital Signs:     TMAX(24)      Intake/Output:   Last shift:         Last 3 shifts: No intake/output data recorded. RRIOLAST3    Intake/Output Summary (Last 24 hours) at 1/31/2020 0827  Last data filed at 1/31/2020 0600  Gross per 24 hour   Intake 2884.35 ml   Output 2900 ml   Net -15.65 ml     EXAM:   GENERAL: awake, interactive chronic contractures,  HEENT:  PERRL, EOMI, no alar flaring or epistaxis, oral mucosa moist without cyanosis, NECK:  no jugular vein distention, no retractions, no thyromegaly or masses, LUNGS: CTA, no w/r/r, HEART:  Regular rate and rhythm with no MGR; no edema is present, ABDOMEN:  soft with no tenderness, bowel sounds present, EXTREMITIES:  warm with no cyanosis, SKIN:  no jaundice or ecchymosis and NEUROLOGIC:  Moving upper extremeties, although contracted.      Prudence MD [facility] [address]

## 2020-01-31 NOTE — PROGRESS NOTES
RN reports good tolerance of dysphagia 1, nectars yesterday. He is NPO today for possible  Chest tube placement. After procedure, please return patient to dysphagia 1, nectar thick liquids. SLP will f/u next week if needed.

## 2020-01-31 NOTE — PROGRESS NOTES
[facility] Dosing Services: Antimicrobial Stewardship Daily Doc 1/31/2020    Consult for antibiotic dosing of Vancomycin by Dr. Darby Elmore  Indication: HAP, MRSA screen positive, RLL opacity w/ possible mucous plugging vs early necrosis on CT  Day of Therapy 1    Ht Readings from Last 1 Encounters:   01/29/20 188 cm (74.02\")        Wt Readings from Last 1 Encounters:   01/31/20 62.3 kg (137 lb 5.6 oz)      Vancomycin therapy:  Current maintenance dose: Initial dosing  Dose calculated to approximate a therapeutic trough of 15-20 mcg/mL. Last trough level- Initial dosing  Plan for level / Adjustment in Therapy: Will order vancomycin 1500 mg (~25 mg/kg) followed by 1000 mg Q8 hrs for anticipated trough ~15 mcg/ml. Scr rounded to 0.7 mg/dL for initial pharmacokinetic calculations d/t Hx TBI and low baseline serum creatinine. Every 8 hour regimen d/t CrCl >100 ml/min, UOP ~1.9 ml/kg/hr past 24 hrs. Dose administration notes:   Doses given appropriately as scheduled    Date Dose & Interval Measured (mcg/mL) Extrapolated (mcg/mL)   ? ? ? ?   ? ? ? ?   ? ? ? ? Non-Kinetic Antimicrobial Dosing Regimen:   Current Regimen:  Metronidazole  Recommendation: Will order metronidazole 500 mg Q12 hours per protocol  Dose administration notes:   Doses given appropriately as scheduled    Other Antimicrobial   (not dosed by pharmacist) Cefepime 2 grams every 8 hours  Levaquin 750 mg Q24 hours   Cultures 1/29 Blood: NGTD, Prelim  1/29 MRSA: Positive  1/29 Respiratory PCR panel: negative for respiratory viruses, bacteria  1/29 Flu A&B: Negative   Serum Creatinine Lab Results   Component Value Date/Time    Creatinine 0.39 (L) 01/31/2020 03:59 AM         Creatinine Clearance Estimated Creatinine Clearance: 133.5 mL/min (A) (by C-G formula based on SCr of 0.39 mg/dL (L)).      Temp Temp: 97 °F (36.1 °C)       WBC Lab Results   Component Value Date/Time    WBC 6.5 01/31/2020 03:59 AM        H/H Lab Results   Component Value Date/Time    HGB 7.7 (L) 01/31/2020 03:59 AM        Platelets    Lab Results   Component Value Date/Time    PLATELET  01/18/0822 03:59 AM     UNABLE TO REPORT ACCURATE COUNT DUE TO PLATELET AGGREGATION, HOWEVER, PLATELETS APPEAR DECREASED IN NUMBER ON SMEAR. PLEASE RESUBMIT SODIUM CITRATE (BLUE) AND EDTA (LAVENDAR) TUBES FOR HEMATOLOGICAL TESTING.           Thanks,  Pharmacist SNEHAL Almeida Contact information: [phone]

## 2020-01-31 NOTE — PROGRESS NOTES
1356-Assisting at bedside for pleural drain placement. VSS at start; timeout performed. Mary Lou  1410-Procedure complete without incident. VS remain stable.  Madalyn CHARLES

## 2020-02-01 LAB
ANION GAP SERPL CALC-SCNC: 6 MMOL/L (ref 5–15)
BUN SERPL-MCNC: 12 MG/DL (ref 6–20)
BUN/CREAT SERPL: 30 (ref 12–20)
CALCIUM SERPL-MCNC: 7.2 MG/DL (ref 8.5–10.1)
CHLORIDE SERPL-SCNC: 108 MMOL/L (ref 97–108)
CO2 SERPL-SCNC: 23 MMOL/L (ref 21–32)
CREAT SERPL-MCNC: 0.4 MG/DL (ref 0.7–1.3)
DATE LAST DOSE: ABNORMAL
DATE LAST DOSE: NORMAL
ERYTHROCYTE [DISTWIDTH] IN BLOOD BY AUTOMATED COUNT: 19.3 % (ref 11.5–14.5)
GLUCOSE FLD-MCNC: 82 MG/DL
GLUCOSE SERPL-MCNC: 121 MG/DL (ref 65–100)
HCT VFR BLD AUTO: 27.9 % (ref 36.6–50.3)
HGB BLD-MCNC: 7.7 G/DL (ref 12.1–17)
LDH FLD L TO P-CCNC: 194 U/L
MAGNESIUM SERPL-MCNC: 2 MG/DL (ref 1.6–2.4)
MCH RBC QN AUTO: 18.9 PG (ref 26–34)
MCHC RBC AUTO-ENTMCNC: 27.6 G/DL (ref 30–36.5)
MCV RBC AUTO: 68.4 FL (ref 80–99)
NRBC # BLD: 0 K/UL (ref 0–0.01)
NRBC BLD-RTO: 0 PER 100 WBC
PHOSPHATE SERPL-MCNC: 2 MG/DL (ref 2.6–4.7)
PLATELET # BLD AUTO: 77 K/UL (ref 150–400)
POTASSIUM SERPL-SCNC: 3.9 MMOL/L (ref 3.5–5.1)
PROT FLD-MCNC: 3 G/DL
RBC # BLD AUTO: 4.08 M/UL (ref 4.1–5.7)
REPORTED DOSE,DOSE: ABNORMAL UNITS
REPORTED DOSE,DOSE: NORMAL UNITS
REPORTED DOSE/TIME,TMG: ABNORMAL
REPORTED DOSE/TIME,TMG: NORMAL
SODIUM SERPL-SCNC: 137 MMOL/L (ref 136–145)
SPECIMEN SOURCE FLD: NORMAL
VANCOMYCIN TROUGH SERPL-MCNC: 29.9 UG/ML (ref 5–10)
VANCOMYCIN TROUGH SERPL-MCNC: 6.5 UG/ML (ref 5–10)
WBC # BLD AUTO: 5.5 K/UL (ref 4.1–11.1)

## 2020-02-01 PROCEDURE — 74011000258 HC RX REV CODE- 258: Performed by: EMERGENCY MEDICINE

## 2020-02-01 PROCEDURE — 74011250637 HC RX REV CODE- 250/637: Performed by: INTERNAL MEDICINE

## 2020-02-01 PROCEDURE — 83735 ASSAY OF MAGNESIUM: CPT

## 2020-02-01 PROCEDURE — 74011250636 HC RX REV CODE- 250/636: Performed by: INTERNAL MEDICINE

## 2020-02-01 PROCEDURE — 80048 BASIC METABOLIC PNL TOTAL CA: CPT

## 2020-02-01 PROCEDURE — 74011250636 HC RX REV CODE- 250/636: Performed by: EMERGENCY MEDICINE

## 2020-02-01 PROCEDURE — 85027 COMPLETE CBC AUTOMATED: CPT

## 2020-02-01 PROCEDURE — 77030012390 HC DRN CHST BTL GTNG -B

## 2020-02-01 PROCEDURE — 84100 ASSAY OF PHOSPHORUS: CPT

## 2020-02-01 PROCEDURE — 36415 COLL VENOUS BLD VENIPUNCTURE: CPT

## 2020-02-01 PROCEDURE — 65660000000 HC RM CCU STEPDOWN

## 2020-02-01 PROCEDURE — C9113 INJ PANTOPRAZOLE SODIUM, VIA: HCPCS | Performed by: INTERNAL MEDICINE

## 2020-02-01 PROCEDURE — 80202 ASSAY OF VANCOMYCIN: CPT

## 2020-02-01 RX ORDER — CHOLECALCIFEROL TAB 125 MCG (5000 UNIT) 125 MCG
5000 TAB ORAL DAILY
Status: DISCONTINUED | OUTPATIENT
Start: 2020-02-01 | End: 2020-02-06 | Stop reason: HOSPADM

## 2020-02-01 RX ADMIN — DANTROLENE SODIUM 100 MG: 25 CAPSULE ORAL at 17:25

## 2020-02-01 RX ADMIN — CHOLECALCIFEROL TAB 125 MCG (5000 UNIT) 5000 UNITS: 125 TAB at 07:45

## 2020-02-01 RX ADMIN — BROMOCRIPTINE MESYLATE 5 MG: 2.5 TABLET ORAL at 20:00

## 2020-02-01 RX ADMIN — VANCOMYCIN HYDROCHLORIDE 1000 MG: 1 INJECTION, POWDER, LYOPHILIZED, FOR SOLUTION INTRAVENOUS at 07:44

## 2020-02-01 RX ADMIN — Medication 10 ML: at 14:10

## 2020-02-01 RX ADMIN — METRONIDAZOLE 500 MG: 500 INJECTION, SOLUTION INTRAVENOUS at 08:53

## 2020-02-01 RX ADMIN — DANTROLENE SODIUM 100 MG: 25 CAPSULE ORAL at 20:00

## 2020-02-01 RX ADMIN — CEFEPIME HYDROCHLORIDE 2 G: 2 INJECTION, POWDER, FOR SOLUTION INTRAVENOUS at 07:45

## 2020-02-01 RX ADMIN — DOCUSATE SODIUM 100 MG: 100 CAPSULE, LIQUID FILLED ORAL at 08:52

## 2020-02-01 RX ADMIN — BROMOCRIPTINE MESYLATE 5 MG: 2.5 TABLET ORAL at 17:25

## 2020-02-01 RX ADMIN — BROMOCRIPTINE MESYLATE 5 MG: 2.5 TABLET ORAL at 07:45

## 2020-02-01 RX ADMIN — DANTROLENE SODIUM 100 MG: 25 CAPSULE ORAL at 07:45

## 2020-02-01 RX ADMIN — CEFEPIME HYDROCHLORIDE 2 G: 2 INJECTION, POWDER, FOR SOLUTION INTRAVENOUS at 22:12

## 2020-02-01 RX ADMIN — SODIUM CHLORIDE 75 ML/HR: 900 INJECTION, SOLUTION INTRAVENOUS at 23:26

## 2020-02-01 RX ADMIN — LACOSAMIDE 200 MG: 100 TABLET, FILM COATED ORAL at 17:25

## 2020-02-01 RX ADMIN — TRAZODONE HYDROCHLORIDE 100 MG: 100 TABLET ORAL at 22:12

## 2020-02-01 RX ADMIN — LEVOFLOXACIN 750 MG: 5 INJECTION, SOLUTION INTRAVENOUS at 07:45

## 2020-02-01 RX ADMIN — SODIUM CHLORIDE 50 ML/HR: 900 INJECTION, SOLUTION INTRAVENOUS at 08:04

## 2020-02-01 RX ADMIN — BROMOCRIPTINE MESYLATE 5 MG: 2.5 TABLET ORAL at 13:44

## 2020-02-01 RX ADMIN — SENNOSIDES 8.6 MG: 8.6 TABLET, FILM COATED ORAL at 08:52

## 2020-02-01 RX ADMIN — DANTROLENE SODIUM 100 MG: 25 CAPSULE ORAL at 13:44

## 2020-02-01 RX ADMIN — PANTOPRAZOLE SODIUM 40 MG: 40 INJECTION, POWDER, FOR SOLUTION INTRAVENOUS at 08:52

## 2020-02-01 RX ADMIN — DOCUSATE SODIUM 100 MG: 100 CAPSULE, LIQUID FILLED ORAL at 17:25

## 2020-02-01 RX ADMIN — CEFEPIME HYDROCHLORIDE 2 G: 2 INJECTION, POWDER, FOR SOLUTION INTRAVENOUS at 14:49

## 2020-02-01 RX ADMIN — METRONIDAZOLE 500 MG: 500 INJECTION, SOLUTION INTRAVENOUS at 20:16

## 2020-02-01 RX ADMIN — PANTOPRAZOLE SODIUM 40 MG: 40 INJECTION, POWDER, FOR SOLUTION INTRAVENOUS at 20:16

## 2020-02-01 RX ADMIN — LACOSAMIDE 200 MG: 100 TABLET, FILM COATED ORAL at 08:52

## 2020-02-01 RX ADMIN — VANCOMYCIN HYDROCHLORIDE 1000 MG: 1 INJECTION, POWDER, LYOPHILIZED, FOR SOLUTION INTRAVENOUS at 15:27

## 2020-02-01 RX ADMIN — VANCOMYCIN HYDROCHLORIDE 1250 MG: 1.25 INJECTION, POWDER, LYOPHILIZED, FOR SOLUTION INTRAVENOUS at 22:12

## 2020-02-01 NOTE — PROGRESS NOTES
1900: Bedside and Verbal shift change report given to MELVIN Yip (oncoming nurse) by Jessie Amaya RN (offgoing nurse). Report included the following information SBAR, Kardex, ED Summary, Procedure Summary, Intake/Output, Recent Results, Cardiac Rhythm Sinus Tachycardia and Quality Measures. Primary Nurse Kassidy Membreno RN and Jessie Amaya RN performed a dual skin assessment on this patient No impairment noted  Bhupinder score is 10.    2000: Shift  Assessment completed, No s/s of distress noted. Mental Status: Patient is alert and nonverbal at baseline, patient nods appropriately at this time. Respiratory: Patient on room air. Coarse lung sounds noted. Right sided chest tube C/D/I. Cardiac:Sinus tachycardia noted at this time. 110s-120s             GI/: Active bowel sounds noted at this time. Patient turned and repositioned at this time. 2100: No s/s of distress noted at this time. 2200: Bedside and Verbal shift change report given to Chayito Walsh (oncoming nurse) by MELVIN Yip (offgoing nurse). Report included the following information SBAR, Kardex, ED Summary, Procedure Summary, Intake/Output, Recent Results, Cardiac Rhythm Sinus Tachycardia and Quality Measures.

## 2020-02-01 NOTE — PROGRESS NOTES
Vancomycin Pharmacy Consult 02/01/20  Vancomycin trough = 6.5 mcg/ml (drawn 7.32hrs post dose), extrapolates to a trough of 5.57 mcg/ml. Level is subtherapeutic   Goal Trough: 15-20 mcg/ml    Plan:   Will change Vancomycin to 1250 mg IV q6hr and recheck level soon    Thank you  Ulysses Gomes, PharmD  [phone]

## 2020-02-01 NOTE — PROGRESS NOTES
[facility]UL[facility]ARY [facility]OC[facility]     Name: Rahul Amaya MRN: [id number]   : 1985 Hospital: [facility]   Date: 2020        Impression Plan   1. Septic shock  2. Leukopenia  3. Fevers  4. Active seizures with a hx of seizure disorder  5. Traumatic brain injury               · IV fluids at 50 ml/hr  · Levophed is off   · Follow up pleural fluid cultures  · On Cefepime/levoflox/Vanc/flagyl. Will d/c levofloxacin  · Pigtail drain for the next 24 hrs depending on output. OUtput not consistent empyema. Unsure if this is infectious source. · Follow up pleural fluid cultures. · Blood cultures NGTD  · lovenox proph  · Famotidine  · Downgrade to stepdown             Radiology  ( personally reviewed) CXR reviewed: small chronic right pleural effusion, otherwise clear. ABG No results for input(s): PHI, PO2I, PCO2I in the last 72 hours. Subjective     Cc: hypotension    31 yo with PMHx of TBI presenting with fevers/seizures. Per pts father pt started having fevers over the weekend. Increasingly more lethargic and overnight had long seizures lasting about 10 min. Pt has had ativan doses to break seizures at this point, but father reports him being lethargic before this. Had some vomiting leading up to this as well. No diarrhea. Pt has issues with chronic constipation. Had a caretaker that was sick 2 weeks ago, otherwise no sick contacts. Last hospitalized  with similar sxs. All cultures neg at that time. Overnight events:  Afebrile  awake and interactive  Pigtail placed yesterday.  approx 100 cc output total. Does not have fluid labs consistent with empyema  Off of levophed  I/O:         Past Medical History:   Diagnosis Date    Anemia 2019    History of vascular access device 2020    5F DOUBLE LUMEN PICC PLACEC BY ROSA MCGARRY RN R BRACHIAL 38 CM, NO DIFFICULTY    Neurological disorder     traumatic brain injury    Seizures ([facility] [address]     [facility])       Past Surgical History:   Procedure Laterality Date    HX CRANIOTOMY        Prior to Admission medications    Medication Sig Start Date End Date Taking? Authorizing Provider   traZODone (DESYREL) 100 mg tablet Take 100 mg by mouth nightly. Yes Provider, Historical   lacosamide (VIMPAT) 200 mg tab tablet Take 1 Tab by mouth two (2) times a day. Max Daily Amount: 400 mg. 12/26/19  Yes Radha Devi MD   diazePAM (VALIUM) 5 mg tablet Take 0.5 Tabs by mouth every eight (8) hours as needed (for excssive spasticity or seizure). Max Daily Amount: 7.5 mg. 12/26/19  Yes Radha Devi MD   aspirin-acetaminophen-caffeine [facility]) 811-249-49 mg per tablet Take 2 Tabs by mouth every six (6) hours as needed for Pain or Headache. Do not use with ibuprofen   Yes Provider, Historical   cholecalciferol, vitamin D3, (VITAMIN D3) 2,000 unit tab Take 2,000 Units by mouth daily. Yes Provider, Historical   omega 3-DHA-EPA-fish oil 1,000 mg (120 mg-180 mg) capsule Take 1 Cap by mouth every evening. Yes Provider, Historical   Lactobacillus acidophilus (ACIDOPHILUS) cap Take 1 Cap by mouth daily. Before morning turn   Yes Provider, Historical   bromocriptine mesylate (BROMOCRIPTINE PO) Take 5 mg by mouth four (4) times daily. Patient takes at 8am, 2pm, 6pm, 8pm   Yes Provider, Historical   dantrolene (DANTRIUM) 100 mg capsule Take 100 mg by mouth four (4) times daily. Patient takes at 8am, 2pm, 6pm, 8pm   Yes Provider, Historical   senna (SENNA) 8.6 mg tablet Take 2 Tabs by mouth nightly. Yes Provider, Historical   pantoprazole (PROTONIX) 40 mg tablet Take 40 mg by mouth daily. Before morning turn   Yes Provider, Historical   ibuprofen (MOTRIN) 600 mg tablet Take 600 mg by mouth every four (4) hours as needed for Pain. Yes Provider, Historical   raNITIdine (ZANTAC) 150 mg tablet Take 150 mg by mouth two (2) times daily as needed for Indigestion.    Yes Provider, Historical   bisacodyl (DULCOLAX) 10 mg suppository Insert 10 mg into rectum daily as needed (constipation). 7/31/19  Yes Enma Pal MD   docusate sodium 100 mg tab Take 100 mg by mouth nightly. 7/31/19  Yes Enma Pal MD     Current Facility-Administered Medications   Medication Dose Route Frequency    cholecalciferol (VITAMIN D3) tablet 5,000 Units  5,000 Units Oral DAILY    vancomycin (VANCOCIN) 1,000 mg in 0.9% sodium chloride (MBP/ADV) 250 mL  1,000 mg IntraVENous Q8H    metroNIDAZOLE (FLAGYL) IVPB premix 500 mg  500 mg IntraVENous Q12H    senna (SENOKOT) tablet 8.6 mg  1 Tab Oral DAILY    docusate sodium (COLACE) capsule 100 mg  100 mg Oral BID    sodium chloride (NS) flush 5-40 mL  5-40 mL IntraVENous Q8H    levoFLOXacin (LEVAQUIN) 750 mg in D5W IVPB  750 mg IntraVENous Q24H    pantoprazole (PROTONIX) 40 mg in 0.9% sodium chloride 10 mL injection  40 mg IntraVENous Q12H    0.9% sodium chloride infusion  75 mL/hr IntraVENous CONTINUOUS    NOREPINephrine (LEVOPHED) 8,000 mcg in dextrose 5% 250 mL infusion  0.5-16 mcg/min IntraVENous TITRATE    bromocriptine (PARLODEL) tablet 5 mg  5 mg Oral QID    dantrolene (DANTRIUM) capsule 100 mg  100 mg Oral QID    lacosamide (VIMPAT) tablet 200 mg  200 mg Oral BID    traZODone (DESYREL) tablet 100 mg  100 mg Oral QHS    cefepime (MAXIPIME) 2 g in 0.9% sodium chloride (MBP/ADV) 100 mL  2 g IntraVENous Q8H     No Known Allergies   Social History     Tobacco Use    Smoking status: Never Smoker    Smokeless tobacco: Never Used   Substance Use Topics    Alcohol use: No      Family History   Problem Relation Age of Onset    No Known Problems Other         reviewed. Patient does not know          Laboratory: I have personally reviewed the critical care flowsheet and labs.      Recent Labs     02/01/20  0435 01/31/20  0359 01/30/20  0625   WBC 5.5 6.5 2.8*   HGB 7.7* 7.7* 8.1*   HCT 27.9* 27.5* 28.3*   PLT 77* UNABLE TO REPORT ACCURATE COUNT DUE TO PLATELET AGGREGATION, HOWEVER, PLATELETS APPEAR DECREASED IN NUMBER ON SMEAR.   PLEASE RESUBMIT SODIUM CITRATE (BLUE) AND EDTA (LAVENDAR) TUBES FOR HEMATOLOGICAL TESTING. 134*     Recent Labs     02/01/20  0435 01/31/20  0359 01/30/20  0625    137 138   K 3.9 3.7 3.2*    107 108   CO2 23 23 22   * 111* 99   BUN 12 12 6   CREA 0.40* 0.39* 0.52*   CA 7.2* 7.5* 7.1*   MG 2.0 2.1 2.0   PHOS 2.0* 1.7* 1.2*       Objective:     Mode Rate Tidal Volume Pressure FiO2 PEEP                    Vital Signs:     TMAX(24)      Intake/Output:   Last shift:         Last 3 shifts: 02/01 0701 - 02/01 1900  In: 620.4 [I.V.:620.4]  Out: 660 [Urine:600]RRIOLAST3    Intake/Output Summary (Last 24 hours) at 2/1/2020 4702  Last data filed at 2/1/2020 9676  Gross per 24 hour   Intake 1860.18 ml   Output 4401 ml   Net -2540.82 ml     EXAM:   GENERAL: awake, interactive chronic contractures,  HEENT:  PERRL, EOMI, no alar flaring or epistaxis, oral mucosa moist without cyanosis, NECK:  no jugular vein distention, no retractions, no thyromegaly or masses, LUNGS: CTA, no w/r/r, HEART:  Regular rate and rhythm with no MGR; no edema is present, ABDOMEN:  soft with no tenderness, bowel sounds present, EXTREMITIES:  warm with no cyanosis, SKIN:  no jaundice or ecchymosis and NEUROLOGIC:  Moving upper extremeties,    Kiana Neves MD  [facility]

## 2020-02-01 NOTE — PROGRESS NOTES
Bedside and Verbal shift change report given to Lisa Sagastume RN (oncoming nurse) by Jasbir Fountain RN (offgoing nurse). Report included the following information SBAR, Kardex, MAR and Cardiac Rhythm NSR/Sinus Tach. 1400: Patient resting in bed. No sign of distress. Home tg aide at bedside. Right pigtail chest tube CDI.   1500: Patient watching TV. Vancomycin trough down to lab.   1630: Patient without signs of distress. Home health aide at bedside. 1800: Patient scratching at PICC line. Encouraged patient not to scratch at PICC line. Nurse tech in to sit with patient. Bedside and Verbal shift change report given to Jaelyn Jarrell RN (oncoming nurse) by Lisa Sagastume RN (offgoing nurse). Report included the following information SBAR, Kardex, MAR and Cardiac Rhythm Sinus Tach.

## 2020-02-01 NOTE — PROGRESS NOTES
0710: Bedside shift change report given to Dinora Stafford RN (oncoming nurse) by Katie Richardson RN (offgoing nurse). Report included the following information Kardex, Intake/Output, MAR, Accordion, Recent Results, Med Rec Status and Cardiac Rhythm ST. Primary Nurse Dontrell Villalta and Michelle Grace RN performed a dual skin assessment on this patient No impairment noted  Bhupinder score is see flowsheet. R pigtail chest tube clean dry intact. 0745: Assessment complete see flowsheet. Patient awake, alert, nonverbal at baseline. Contracted in bilateral hands and arms at baseline. Lungs clear/diminished. NSR on monitor. Sp02 100%. Levophed infusing at 1mcg, will wean to maintain MAP >65.   0817: MAP's sustaining >65, levophed stopped. 1200: Re assessment complete see flowsheet. 1400: Bedside shift change report given to Demi Burgess RN (oncoming nurse) by Dinora Stafford RN (offgoing nurse). Report included the following information SBAR, Intake/Output, MAR, Accordion, Recent Results, Med Rec Status and Cardiac Rhythm NSR/ST.

## 2020-02-01 NOTE — PROGRESS NOTES
Elfego Jansen [facility] 79  [address] [facility], [address] [place] [address]  [phone]      Medical Progress Note      NAME: Rusty Reid   :  1985  MRM:  [id number]    Date/Time: 2020  8:38 AM          Subjective:     Chief Complaint:  Non-verbal: no family at bedside    ROS:    Unable to obtain          Objective:       Vitals:          Last 24hrs VS reviewed since prior progress note.  Most recent are:    Visit Vitals  /83   Pulse 95   Temp 97 °F (36.1 °C)   Resp 14   Ht 6' 2.02\" (1.88 m)   Wt 66.5 kg (146 lb 9.7 oz)   SpO2 100%   BMI 18.81 kg/m²     SpO2 Readings from Last 6 Encounters:   20 100%   19 97%   19 90%   19 92%   19 100%   18 99%            Intake/Output Summary (Last 24 hours) at 2020 0856  Last data filed at 2020 5307  Gross per 24 hour   Intake 1839.75 ml   Output 4401 ml   Net -2561.25 ml          Exam:     Physical Exam:    Gen:    Well-developed, thin, frail, chronically ill-appearing, in no acute distress  HEENT:  Pink conjunctivae, PERRL, hearing intact to voice, moist mucous membranes  Neck:  Supple, without masses, thyroid non-tender  Resp:  No accessory muscle use, clear breath sounds without wheezes rales or rhonchi  Card:   No murmurs, normal S1, S2 without thrills, bruits or peripheral edema  Abd:  Soft, non-tender, non-distended, normoactive bowel sounds are present, no palpable organomegaly and no detectable hernias  Lymph:  No cervical or inguinal adenopathy  Musc:  No cyanosis or clubbing  Skin:   No rashes or ulcers, skin turgor is goodd  Neuro:  Non-verbal and contracted, does not follow commands  Psych:  No insight, not oriented to person, place and time, alert       Telemetry reviewed:   normal sinus rhythm    Medications Reviewed: (see below)    Lab Data Reviewed: (see below)    ______________________________________________________________________    Medications:     Current Facility-Administered Medications   Medication Dose Route Frequency    cholecalciferol (VITAMIN D3) tablet 5,000 Units  5,000 Units Oral DAILY    vancomycin (VANCOCIN) 1,000 mg in 0.9% sodium chloride (MBP/ADV) 250 mL  1,000 mg IntraVENous Q8H    metroNIDAZOLE (FLAGYL) IVPB premix 500 mg  500 mg IntraVENous Q12H    senna (SENOKOT) tablet 8.6 mg  1 Tab Oral DAILY    docusate sodium (COLACE) capsule 100 mg  100 mg Oral BID    bisacodyL (DULCOLAX) suppository 10 mg  10 mg Rectal DAILY PRN    sodium chloride (NS) flush 5-40 mL  5-40 mL IntraVENous Q8H    sodium chloride (NS) flush 5-40 mL  5-40 mL IntraVENous PRN    sodium chloride (NS) flush 5-10 mL  5-10 mL IntraVENous PRN    levoFLOXacin (LEVAQUIN) 750 mg in D5W IVPB  750 mg IntraVENous Q24H    pantoprazole (PROTONIX) 40 mg in 0.9% sodium chloride 10 mL injection  40 mg IntraVENous Q12H    0.9% sodium chloride infusion  75 mL/hr IntraVENous CONTINUOUS    NOREPINephrine (LEVOPHED) 8,000 mcg in dextrose 5% 250 mL infusion  0.5-16 mcg/min IntraVENous TITRATE    alteplase (CATHFLO) 1 mg in sterile water (preservative free) 1 mL injection  1 mg InterCATHeter PRN    bromocriptine (PARLODEL) tablet 5 mg  5 mg Oral QID    dantrolene (DANTRIUM) capsule 100 mg  100 mg Oral QID    diazePAM (VALIUM) tablet 2.5 mg  2.5 mg Oral Q8H PRN    lacosamide (VIMPAT) tablet 200 mg  200 mg Oral BID    traZODone (DESYREL) tablet 100 mg  100 mg Oral QHS    cefepime (MAXIPIME) 2 g in 0.9% sodium chloride (MBP/ADV) 100 mL  2 g IntraVENous Q8H            Lab Review:     Recent Labs     02/01/20  0435 01/31/20  0359 01/30/20  0625   WBC 5.5 6.5 2.8*   HGB 7.7* 7.7* 8.1*   HCT 27.9* 27.5* 28.3*   PLT 77* UNABLE TO REPORT ACCURATE COUNT DUE TO PLATELET AGGREGATION, HOWEVER, PLATELETS APPEAR DECREASED IN NUMBER ON SMEAR.   PLEASE RESUBMIT SODIUM CITRATE (BLUE) AND EDTA (LAVENDAR) TUBES FOR HEMATOLOGICAL TESTING. 134*     Recent Labs     02/01/20  0435 01/31/20  0359 01/30/20  0625    137 138   K 3.9 3.7 3.2*    107 108   CO2 23 23 22   * 111* 99   BUN 12 12 6   CREA 0.40* 0.39* 0.52*   CA 7.2* 7.5* 7.1*   MG 2.0 2.1 2.0   PHOS 2.0* 1.7* 1.2*     Lab Results   Component Value Date/Time    Glucose (POC) 103 (H) 12/23/2019 05:41 PM    Glucose (POC) 96 08/19/2019 08:49 AM    Glucose (POC) 92 07/29/2019 06:17 AM    Glucose (POC) 70 07/29/2019 03:09 AM    Glucose (POC) 83 07/23/2019 03:47 PM     No results for input(s): PH, PCO2, PO2, HCO3, FIO2 in the last 72 hours. No results for input(s): INR, INREXT, INREXT in the last 72 hours. No results found for: SDES  Lab Results   Component Value Date/Time    Culture result: PENDING 01/31/2020 05:05 PM    Culture result: MRSA PRESENT (A) 01/29/2020 03:13 PM    Culture result:  01/29/2020 03:13 PM     CALLED TO AND READ BACK BY  Traci Cano AT 2327 ON 1/30/20. PJ      Culture result:  01/29/2020 03:13 PM         Screening of patient nares for MRSA is for surveillance purposes and, if positive, to facilitate isolation considerations in high risk settings. It is not intended for automatic decolonization interventions per se as regimens are not sufficiently effective to warrant routine use. Assessment:     Principal Problem:    Pneumonia (1/31/2020)    Active Problems:    TBI (traumatic brain injury) ([facility] (7/25/2019)      Anemia (7/28/2019)      Seizure ([address] (8/19/2019)      GI bleed (1/29/2020)      Septic shock ([address] (1/31/2020)           Plan:     Principal Problem:    Pneumonia   - with probable empyema based on CT   - s/p IR guided chest tube placement   - continue vancomycin, levofloxacin,cefepime and flagyl              - appreciate pulmonary input     Active Problems:    GI bleed (1/29/2020)   - Hgb stable, GI input appreciated        SIRS (systemic inflammatory response syndrome) ([address] (1/29/2020)   - pneumonia and probable empyema as above are source   - BP is stable.  I will taper off levophed and increase IVF to 75 ml/hr      TBI (traumatic brain injury) ([facility] (7/25/2019)   - at baseline from my prior experience with him   - Palliative Care input appreciated, to d/w mother who apparently has guardianship    -remains full code.       Anemia (7/28/2019)   - Hgb stable, monitor and transfused when HB<7      Seizure ([facility] (8/19/2019)   - loaded with Vimpat on admit, level slightly high, continue home dosing   - seizure likely due to acute illness as above        Total time spent in patient care: 25 minutes                  Care Plan discussed with: Patient,  Nursing Staff     Discussed:  Code Status, Care Plan     Prophylaxis:  Lovenox    Disposition:   PT, OT, RN           ___________________________________________________    Attending Physician: Santos Turner MD

## 2020-02-02 ENCOUNTER — APPOINTMENT (OUTPATIENT)
Dept: GENERAL RADIOLOGY | Age: 35
DRG: 871 | End: 2020-02-02
Attending: INTERNAL MEDICINE
Payer: MEDICARE

## 2020-02-02 LAB
ALBUMIN SERPL-MCNC: 2.4 G/DL (ref 3.5–5)
ANION GAP SERPL CALC-SCNC: 6 MMOL/L (ref 5–15)
BUN SERPL-MCNC: 15 MG/DL (ref 6–20)
BUN/CREAT SERPL: 38 (ref 12–20)
CALCIUM SERPL-MCNC: 8 MG/DL (ref 8.5–10.1)
CHLORIDE SERPL-SCNC: 105 MMOL/L (ref 97–108)
CO2 SERPL-SCNC: 26 MMOL/L (ref 21–32)
CREAT SERPL-MCNC: 0.4 MG/DL (ref 0.7–1.3)
DATE LAST DOSE: ABNORMAL
ERYTHROCYTE [DISTWIDTH] IN BLOOD BY AUTOMATED COUNT: 19.5 % (ref 11.5–14.5)
GLUCOSE SERPL-MCNC: 101 MG/DL (ref 65–100)
HCT VFR BLD AUTO: 26.8 % (ref 36.6–50.3)
HGB BLD-MCNC: 7.6 G/DL (ref 12.1–17)
IRON SATN MFR SERPL: 5 % (ref 20–50)
IRON SERPL-MCNC: 14 UG/DL (ref 35–150)
MCH RBC QN AUTO: 19.4 PG (ref 26–34)
MCHC RBC AUTO-ENTMCNC: 28.4 G/DL (ref 30–36.5)
MCV RBC AUTO: 68.4 FL (ref 80–99)
NRBC # BLD: 0 K/UL (ref 0–0.01)
NRBC BLD-RTO: 0 PER 100 WBC
PHOSPHATE SERPL-MCNC: 3 MG/DL (ref 2.6–4.7)
PLATELET # BLD AUTO: 117 K/UL (ref 150–400)
PMV BLD AUTO: 9.5 FL (ref 8.9–12.9)
POTASSIUM SERPL-SCNC: 4.1 MMOL/L (ref 3.5–5.1)
RBC # BLD AUTO: 3.92 M/UL (ref 4.1–5.7)
REPORTED DOSE,DOSE: ABNORMAL UNITS
REPORTED DOSE/TIME,TMG: ABNORMAL
SODIUM SERPL-SCNC: 137 MMOL/L (ref 136–145)
TIBC SERPL-MCNC: 284 UG/DL (ref 250–450)
VANCOMYCIN TROUGH SERPL-MCNC: 22 UG/ML (ref 5–10)
WBC # BLD AUTO: 4.2 K/UL (ref 4.1–11.1)

## 2020-02-02 PROCEDURE — 74011000258 HC RX REV CODE- 258: Performed by: EMERGENCY MEDICINE

## 2020-02-02 PROCEDURE — 74011250637 HC RX REV CODE- 250/637: Performed by: INTERNAL MEDICINE

## 2020-02-02 PROCEDURE — 85027 COMPLETE CBC AUTOMATED: CPT

## 2020-02-02 PROCEDURE — 36415 COLL VENOUS BLD VENIPUNCTURE: CPT

## 2020-02-02 PROCEDURE — 80202 ASSAY OF VANCOMYCIN: CPT

## 2020-02-02 PROCEDURE — 74011250636 HC RX REV CODE- 250/636: Performed by: EMERGENCY MEDICINE

## 2020-02-02 PROCEDURE — 80069 RENAL FUNCTION PANEL: CPT

## 2020-02-02 PROCEDURE — 74011250636 HC RX REV CODE- 250/636: Performed by: INTERNAL MEDICINE

## 2020-02-02 PROCEDURE — C9113 INJ PANTOPRAZOLE SODIUM, VIA: HCPCS | Performed by: INTERNAL MEDICINE

## 2020-02-02 PROCEDURE — 71045 X-RAY EXAM CHEST 1 VIEW: CPT

## 2020-02-02 PROCEDURE — 83540 ASSAY OF IRON: CPT

## 2020-02-02 PROCEDURE — 65660000000 HC RM CCU STEPDOWN

## 2020-02-02 RX ADMIN — PANTOPRAZOLE SODIUM 40 MG: 40 INJECTION, POWDER, FOR SOLUTION INTRAVENOUS at 20:48

## 2020-02-02 RX ADMIN — BROMOCRIPTINE MESYLATE 5 MG: 2.5 TABLET ORAL at 20:45

## 2020-02-02 RX ADMIN — Medication 10 ML: at 14:53

## 2020-02-02 RX ADMIN — DANTROLENE SODIUM 100 MG: 25 CAPSULE ORAL at 08:45

## 2020-02-02 RX ADMIN — BROMOCRIPTINE MESYLATE 5 MG: 2.5 TABLET ORAL at 18:15

## 2020-02-02 RX ADMIN — SENNOSIDES 8.6 MG: 8.6 TABLET, FILM COATED ORAL at 08:44

## 2020-02-02 RX ADMIN — CEFEPIME HYDROCHLORIDE 2 G: 2 INJECTION, POWDER, FOR SOLUTION INTRAVENOUS at 23:43

## 2020-02-02 RX ADMIN — PANTOPRAZOLE SODIUM 40 MG: 40 INJECTION, POWDER, FOR SOLUTION INTRAVENOUS at 08:46

## 2020-02-02 RX ADMIN — VANCOMYCIN HYDROCHLORIDE 1250 MG: 1.25 INJECTION, POWDER, LYOPHILIZED, FOR SOLUTION INTRAVENOUS at 03:57

## 2020-02-02 RX ADMIN — BROMOCRIPTINE MESYLATE 5 MG: 2.5 TABLET ORAL at 14:52

## 2020-02-02 RX ADMIN — METRONIDAZOLE 500 MG: 500 INJECTION, SOLUTION INTRAVENOUS at 08:45

## 2020-02-02 RX ADMIN — DOCUSATE SODIUM 100 MG: 100 CAPSULE, LIQUID FILLED ORAL at 18:15

## 2020-02-02 RX ADMIN — Medication 10 ML: at 21:00

## 2020-02-02 RX ADMIN — DANTROLENE SODIUM 100 MG: 25 CAPSULE ORAL at 20:45

## 2020-02-02 RX ADMIN — CEFEPIME HYDROCHLORIDE 2 G: 2 INJECTION, POWDER, FOR SOLUTION INTRAVENOUS at 14:52

## 2020-02-02 RX ADMIN — DANTROLENE SODIUM 100 MG: 25 CAPSULE ORAL at 14:52

## 2020-02-02 RX ADMIN — TRAZODONE HYDROCHLORIDE 100 MG: 100 TABLET ORAL at 21:00

## 2020-02-02 RX ADMIN — SODIUM CHLORIDE 75 ML/HR: 900 INJECTION, SOLUTION INTRAVENOUS at 13:08

## 2020-02-02 RX ADMIN — VANCOMYCIN HYDROCHLORIDE 1250 MG: 1.25 INJECTION, POWDER, LYOPHILIZED, FOR SOLUTION INTRAVENOUS at 10:29

## 2020-02-02 RX ADMIN — METRONIDAZOLE 500 MG: 500 INJECTION, SOLUTION INTRAVENOUS at 20:45

## 2020-02-02 RX ADMIN — VANCOMYCIN HYDROCHLORIDE 1250 MG: 1.25 INJECTION, POWDER, LYOPHILIZED, FOR SOLUTION INTRAVENOUS at 18:08

## 2020-02-02 RX ADMIN — DOCUSATE SODIUM 100 MG: 100 CAPSULE, LIQUID FILLED ORAL at 08:44

## 2020-02-02 RX ADMIN — BROMOCRIPTINE MESYLATE 5 MG: 2.5 TABLET ORAL at 08:44

## 2020-02-02 RX ADMIN — CEFEPIME HYDROCHLORIDE 2 G: 2 INJECTION, POWDER, FOR SOLUTION INTRAVENOUS at 06:41

## 2020-02-02 RX ADMIN — LACOSAMIDE 200 MG: 100 TABLET, FILM COATED ORAL at 08:44

## 2020-02-02 RX ADMIN — DANTROLENE SODIUM 100 MG: 25 CAPSULE ORAL at 18:15

## 2020-02-02 RX ADMIN — BISACODYL 10 MG: 10 SUPPOSITORY RECTAL at 14:52

## 2020-02-02 RX ADMIN — LACOSAMIDE 200 MG: 100 TABLET, FILM COATED ORAL at 18:15

## 2020-02-02 RX ADMIN — VANCOMYCIN HYDROCHLORIDE 1250 MG: 1.25 INJECTION, POWDER, LYOPHILIZED, FOR SOLUTION INTRAVENOUS at 22:10

## 2020-02-02 RX ADMIN — CHOLECALCIFEROL TAB 125 MCG (5000 UNIT) 5000 UNITS: 125 TAB at 08:44

## 2020-02-02 NOTE — PROGRESS NOTES
[address] [address] Pharmacy Dosing Services: Antimicrobial Stewardship Daily Doc 2/2/2020  Consult for antibiotic dosing of Vancomycin by Dr. Lawanda West  Indication: HCAP  Day of Therapy: 3    Ht Readings from Last 1 Encounters:   01/29/20 188 cm (74.02\")        Wt Readings from Last 1 Encounters:   02/02/20 64.2 kg (141 lb 8.6 oz)      Vancomycin therapy:  Current maintenance dose: 1250(mg) every 6 hours   Dose calculated to approximate a therapeutic trough of 15-20 mcg/mL. Last trough level: 22 mcg/ml on 2/2 @ 1506, drawn 5 hours post dose, calculates to trough of ~16 mcg/ml=therapeutic. Plan for level / Adjustment in Therapy: Continue current regimen. Recheck surveillance level in a few days. WBC WNL, afebrile. Renal function stable/good urine output. Dose administration notes:  Doses not given as scheduled. 1600 dose not yet given at 1800, called and informed RN to give dose. Other Antimicrobial   (not dosed by pharmacist) Cefepime 2 gm IV q8hr  Flagyl 500 mg IV q12hr   Cultures 1/31 urine: in process  1/31: Pleural fluid: NG x2d pending  AFB smear-negative final  AFB culture: in process   Fungus pending  1/29: Nares: MRSA positive final  1/29: Resp panel: neg final  1/29: Blood: NG x4 days pending  1/29: Blood: NG x4 days pending   Serum Creatinine Lab Results   Component Value Date/Time    Creatinine 0.40 (L) 02/02/2020 04:26 AM         Creatinine Clearance Estimated Creatinine Clearance: 137.6 mL/min (A) (by C-G formula based on SCr of 0.4 mg/dL (L)).      Temp Temp: 97.6 °F (36.4 °C)       WBC Lab Results   Component Value Date/Time    WBC 4.2 02/02/2020 04:26 AM        H/H Lab Results   Component Value Date/Time    HGB 7.6 (L) 02/02/2020 04:26 AM        Platelets    Lab Results   Component Value Date/Time    PLATELET 861 (L) 57/38/4684 04:26 AM          Pharmacist Mavis Maldonado Contact information: [phone]

## 2020-02-02 NOTE — PROGRESS NOTES
[facility]     Name: Dipesh Trent MRN: [id number]   : 1985 Hospital: [facility]   Date: 2020        Impression Plan   1. Septic shock  2. Leukopenia  3. Fevers  4. Active seizures on admission with a hx of seizure disorder  5. Traumatic brain injury  6. SMall right loculated effusion s/p pigtail               · D/C IV fluids  · Follow up pleural fluid cultures  · On Cefepime/levoflox/Vanc/flagyl. Will d/c levofloxacin  · Pigtail drain for the next 24 hrs depending on output. OUtput not consistent empyema. Unsure if this is infectious source. Would like to get a repeat CT chest tomorrow AM to look at RLL process again and assess for any residual fluid. · Follow up pleural fluid cultures- NGTD  · Blood cultures NGTD  · lovenox proph  · Famotidine  · Awaiting stepdown             Radiology  ( personally reviewed) CXR reviewed: small chronic right pleural effusion, otherwise clear. ABG No results for input(s): PHI, PO2I, PCO2I in the last 72 hours. Subjective     Cc: hypotension    31 yo with PMHx of TBI presenting with fevers/seizures. Per pts father pt started having fevers over the weekend. Increasingly more lethargic and overnight had long seizures lasting about 10 min. Pt has had ativan doses to break seizures at this point, but father reports him being lethargic before this. Had some vomiting leading up to this as well. No diarrhea. Pt has issues with chronic constipation. Had a caretaker that was sick 2 weeks ago, otherwise no sick contacts. Last hospitalized  with similar sxs. All cultures neg at that time.      Overnight events:  Afebrile  awake and interactive  Pigtail output 100 cc      Past Medical History:   Diagnosis Date    Anemia 2019    History of vascular access device 2020    5F DOUBLE LUMEN PICC PLACEC BY ROSA MCGARRY RN R BRACHIAL 38 CM, NO DIFFICULTY    Neurological disorder     traumatic brain injury    Seizures ([facility]  TBI [facility]traumatic [facility]) [facility])       Past Surgical History:   Procedure Laterality Date    HX CRANIOTOMY        Prior to Admission medications    Medication Sig Start Date End Date Taking? Authorizing Provider   traZODone (DESYREL) 100 mg tablet Take 100 mg by mouth nightly. Yes Provider, Historical   lacosamide (VIMPAT) 200 mg tab tablet Take 1 Tab by mouth two (2) times a day. Max Daily Amount: 400 mg. 12/26/19  Yes Eliane Todd MD   diazePAM (VALIUM) 5 mg tablet Take 0.5 Tabs by mouth every eight (8) hours as needed (for excssive spasticity or seizure). Max Daily Amount: 7.5 mg. 12/26/19  Yes Eliane Todd MD   aspirin-acetaminophen-caffeine [facility]) 146-665-66 mg per tablet Take 2 Tabs by mouth every six (6) hours as needed for Pain or Headache. Do not use with ibuprofen   Yes Provider, Historical   cholecalciferol, vitamin D3, (VITAMIN D3) 2,000 unit tab Take 2,000 Units by mouth daily. Yes Provider, Historical   omega 3-DHA-EPA-fish oil 1,000 mg (120 mg-180 mg) capsule Take 1 Cap by mouth every evening. Yes Provider, Historical   Lactobacillus acidophilus (ACIDOPHILUS) cap Take 1 Cap by mouth daily. Before morning turn   Yes Provider, Historical   bromocriptine mesylate (BROMOCRIPTINE PO) Take 5 mg by mouth four (4) times daily. Patient takes at 8am, 2pm, 6pm, 8pm   Yes Provider, Historical   dantrolene (DANTRIUM) 100 mg capsule Take 100 mg by mouth four (4) times daily. Patient takes at 8am, 2pm, 6pm, 8pm   Yes Provider, Historical   senna (SENNA) 8.6 mg tablet Take 2 Tabs by mouth nightly. Yes Provider, Historical   pantoprazole (PROTONIX) 40 mg tablet Take 40 mg by mouth daily. Before morning turn   Yes Provider, Historical   ibuprofen (MOTRIN) 600 mg tablet Take 600 mg by mouth every four (4) hours as needed for Pain. Yes Provider, Historical   raNITIdine (ZANTAC) 150 mg tablet Take 150 mg by mouth two (2) times daily as needed for Indigestion.    Yes Provider, Historical bisacodyl (DULCOLAX) 10 mg suppository Insert 10 mg into rectum daily as needed (constipation). 7/31/19  Yes Gabbie Cooney MD   docusate sodium 100 mg tab Take 100 mg by mouth nightly. 7/31/19  Yes Gabbie Cooney MD     Current Facility-Administered Medications   Medication Dose Route Frequency    cholecalciferol (VITAMIN D3) tablet 5,000 Units  5,000 Units Oral DAILY    vancomycin (VANCOCIN) 1,250 mg in 0.9% sodium chloride (MBP/ADV) 250 mL  1,250 mg IntraVENous Q6H    metroNIDAZOLE (FLAGYL) IVPB premix 500 mg  500 mg IntraVENous Q12H    senna (SENOKOT) tablet 8.6 mg  1 Tab Oral DAILY    docusate sodium (COLACE) capsule 100 mg  100 mg Oral BID    sodium chloride (NS) flush 5-40 mL  5-40 mL IntraVENous Q8H    pantoprazole (PROTONIX) 40 mg in 0.9% sodium chloride 10 mL injection  40 mg IntraVENous Q12H    0.9% sodium chloride infusion  75 mL/hr IntraVENous CONTINUOUS    NOREPINephrine (LEVOPHED) 8,000 mcg in dextrose 5% 250 mL infusion  0.5-16 mcg/min IntraVENous TITRATE    bromocriptine (PARLODEL) tablet 5 mg  5 mg Oral QID    dantrolene (DANTRIUM) capsule 100 mg  100 mg Oral QID    lacosamide (VIMPAT) tablet 200 mg  200 mg Oral BID    traZODone (DESYREL) tablet 100 mg  100 mg Oral QHS    cefepime (MAXIPIME) 2 g in 0.9% sodium chloride (MBP/ADV) 100 mL  2 g IntraVENous Q8H     No Known Allergies   Social History     Tobacco Use    Smoking status: Never Smoker    Smokeless tobacco: Never Used   Substance Use Topics    Alcohol use: No      Family History   Problem Relation Age of Onset    No Known Problems Other         reviewed. Patient does not know          Laboratory: I have personally reviewed the critical care flowsheet and labs. Recent Labs     02/02/20  0426 02/01/20  0435 01/31/20  0359   WBC 4.2 5.5 6.5   HGB 7.6* 7.7* 7.7*   HCT 26.8* 27.9* 27.5*   * 77* UNABLE TO REPORT ACCURATE COUNT DUE TO PLATELET AGGREGATION, HOWEVER, PLATELETS APPEAR DECREASED IN NUMBER ON SMEAR. PLEASE RESUBMIT SODIUM CITRATE (BLUE) AND EDTA (LAVENDAR) TUBES FOR HEMATOLOGICAL TESTING. Recent Labs     02/02/20  0426 02/01/20  0435 01/31/20  0359    137 137   K 4.1 3.9 3.7    108 107   CO2 26 23 23   * 121* 111*   BUN 15 12 12   CREA 0.40* 0.40* 0.39*   CA 8.0* 7.2* 7.5*   MG  --  2.0 2.1   PHOS 3.0 2.0* 1.7*   ALB 2.4*  --   --        Objective:     Mode Rate Tidal Volume Pressure FiO2 PEEP                    Vital Signs:     TMAX(24)      Intake/Output:   Last shift:         Last 3 shifts: No intake/output data recorded. [id number]    Intake/Output Summary (Last 24 hours) at 2/2/2020 0815  Last data filed at 2/2/2020 1979  Gross per 24 hour   Intake 3087.43 ml   Output 3872 ml   Net -784.57 ml     EXAM:   GENERAL: awake, interactive chronic contractures,  HEENT:  PERRL, EOMI, no alar flaring or epistaxis, oral mucosa moist without cyanosis, NECK:  no jugular vein distention, no retractions, no thyromegaly or masses, LUNGS: CTA, no w/r/r, HEART:  Regular rate and rhythm with no MGR; no edema is present, ABDOMEN:  soft with no tenderness, bowel sounds present, EXTREMITIES:  warm with no cyanosis, SKIN:  no jaundice or ecchymosis and NEUROLOGIC:  Moving upper extremeties,    Sandra Colin MD  [facility] [place]

## 2020-02-02 NOTE — PROGRESS NOTES
Bedside and Verbal shift change report given to Celeste Moser RN (oncoming nurse) by Nevaeh Lanier RN (offgoing nurse). Report included the following information SBAR, Kardex, MAR and Cardiac Rhythm NSR/Sinus Tach. 0800: Open wound to coccyx. 1000: Patient resting quietly in bed. Appears to be watching TV. No signs of distress. 1500: bisacodyl administered. 1630: Patient resting quietly in bed. 1800: Patient without signs of distress. Bedside and Verbal shift change report given to Juanita Aguilar RN (oncoming nurse) by Celeste Moser RN (offgoing nurse). Report included the following information SBAR, Kardex, MAR and Cardiac Rhythm NSR.

## 2020-02-02 NOTE — PROGRESS NOTES
Elfego Jansen [facility]  [address] [facility], [address] [place] [address]  [phone]      Medical Progress Note      NAME: Fay Pardo   :  1985  MRM:  [id number]    Date/Time: 2020  8:38 AM          Subjective:     Chief Complaint:  Non-verbal: no family at bedside    ROS:    Unable to obtain          Objective:       Vitals:          Last 24hrs VS reviewed since prior progress note.  Most recent are:    Visit Vitals  /67   Pulse 79   Temp 97.8 °F (36.6 °C)   Resp 17   Ht 6' 2.02\" (1.88 m)   Wt 64.2 kg (141 lb 8.6 oz)   SpO2 97%   BMI 18.16 kg/m²     SpO2 Readings from Last 6 Encounters:   20 97%   19 97%   19 90%   19 92%   19 100%   18 99%            Intake/Output Summary (Last 24 hours) at 2020 0916  Last data filed at 2020 8622  Gross per 24 hour   Intake 2386.42 ml   Output 3872 ml   Net -1485.58 ml          Exam:     Physical Exam:    Gen:    Well-developed, thin, frail, chronically ill-appearing, in no acute distress  HEENT:  Pink conjunctivae, PERRL, hearing intact to voice, moist mucous membranes  Neck:  Supple, without masses, thyroid non-tender  Resp:  diminished bs with chest tube inserted in right chest  Card:   No murmurs, normal S1, S2 without thrills, bruits or peripheral edema  Abd:  Soft, non-tender, non-distended, normoactive bowel sounds are present, no palpable organomegaly and no detectable hernias  Lymph:  No cervical or inguinal adenopathy  Musc:  No cyanosis or clubbing  Skin:   No rashes or ulcers, skin turgor is goodd  Neuro:  Non-verbal and contracted, does not follow commands  Psych:  No insight, not oriented to person, place and time, alert       Telemetry reviewed:   normal sinus rhythm    Medications Reviewed: (see below)    Lab Data Reviewed: (see below)    ______________________________________________________________________    Medications:     Current Facility-Administered Medications   Medication Dose Route Frequency    cholecalciferol (VITAMIN D3) tablet 5,000 Units  5,000 Units Oral DAILY    vancomycin (VANCOCIN) 1,250 mg in 0.9% sodium chloride (MBP/ADV) 250 mL  1,250 mg IntraVENous Q6H    metroNIDAZOLE (FLAGYL) IVPB premix 500 mg  500 mg IntraVENous Q12H    senna (SENOKOT) tablet 8.6 mg  1 Tab Oral DAILY    docusate sodium (COLACE) capsule 100 mg  100 mg Oral BID    bisacodyL (DULCOLAX) suppository 10 mg  10 mg Rectal DAILY PRN    sodium chloride (NS) flush 5-40 mL  5-40 mL IntraVENous Q8H    sodium chloride (NS) flush 5-40 mL  5-40 mL IntraVENous PRN    sodium chloride (NS) flush 5-10 mL  5-10 mL IntraVENous PRN    pantoprazole (PROTONIX) 40 mg in 0.9% sodium chloride 10 mL injection  40 mg IntraVENous Q12H    0.9% sodium chloride infusion  75 mL/hr IntraVENous CONTINUOUS    NOREPINephrine (LEVOPHED) 8,000 mcg in dextrose 5% 250 mL infusion  0.5-16 mcg/min IntraVENous TITRATE    alteplase (CATHFLO) 1 mg in sterile water (preservative free) 1 mL injection  1 mg InterCATHeter PRN    bromocriptine (PARLODEL) tablet 5 mg  5 mg Oral QID    dantrolene (DANTRIUM) capsule 100 mg  100 mg Oral QID    diazePAM (VALIUM) tablet 2.5 mg  2.5 mg Oral Q8H PRN    lacosamide (VIMPAT) tablet 200 mg  200 mg Oral BID    traZODone (DESYREL) tablet 100 mg  100 mg Oral QHS    cefepime (MAXIPIME) 2 g in 0.9% sodium chloride (MBP/ADV) 100 mL  2 g IntraVENous Q8H            Lab Review:     Recent Labs     02/02/20  0426 02/01/20  0435 01/31/20  0359   WBC 4.2 5.5 6.5   HGB 7.6* 7.7* 7.7*   HCT 26.8* 27.9* 27.5*   * 77* UNABLE TO REPORT ACCURATE COUNT DUE TO PLATELET AGGREGATION, HOWEVER, PLATELETS APPEAR DECREASED IN NUMBER ON SMEAR. PLEASE RESUBMIT SODIUM CITRATE (BLUE) AND EDTA (LAVENDAR) TUBES FOR HEMATOLOGICAL TESTING.      Recent Labs     02/02/20  0426 02/01/20  0435 01/31/20  0359    137 137   K 4.1 3.9 3.7    108 107   CO2 26 23 23   * 121* 111*   BUN 15 12 12   CREA 0.40* 0.40* 0.39*   CA 8.0* 7.2* 7.5*   MG  --  2.0 2.1   PHOS 3.0 2.0* 1.7*   ALB 2.4*  --   --      Lab Results   Component Value Date/Time    Glucose (POC) 103 (H) 12/23/2019 05:41 PM    Glucose (POC) 96 08/19/2019 08:49 AM    Glucose (POC) 92 07/29/2019 06:17 AM    Glucose (POC) 70 07/29/2019 03:09 AM    Glucose (POC) 83 07/23/2019 03:47 PM     No results for input(s): PH, PCO2, PO2, HCO3, FIO2 in the last 72 hours. No results for input(s): INR, INREXT, INREXT in the last 72 hours. No results found for: SDES  Lab Results   Component Value Date/Time    Culture result: NO GROWTH AFTER 12 HOURS 01/31/2020 05:05 PM    Culture result: MRSA PRESENT (A) 01/29/2020 03:13 PM    Culture result:  01/29/2020 03:13 PM     CALLED TO AND READ BACK BY  Teetee Carranza AT 2327 ON 1/30/20. PJ      Culture result:  01/29/2020 03:13 PM         Screening of patient nares for MRSA is for surveillance purposes and, if positive, to facilitate isolation considerations in high risk settings. It is not intended for automatic decolonization interventions per se as regimens are not sufficiently effective to warrant routine use. Assessment:     Principal Problem:    Pneumonia (1/31/2020)    Active Problems:    TBI (traumatic brain injury) ([facility] (7/25/2019)      Anemia (7/28/2019)      Seizure ([facility] (8/19/2019)      GI bleed (1/29/2020)      Septic shock ([facility] (1/31/2020)           Plan:     Principal Problem:    Pneumonia   - with empyema based on CT   - s/p IR guided chest tube placement. Draining well   - continue vancomycin, cefepime and flagyl per pulmonary              - appreciate pulmonary input     Active Problems:    GI bleed (1/29/2020)   - Hgb low but stable, GI input appreciated              - check iron profile and transfuse once HB<7        SIRS (systemic inflammatory response syndrome) ([facility] (1/29/2020)   - pneumonia and probable empyema as above are source   - BP is stable.  Patient is off levophed and continue IVF to 75 ml/hr      TBI (traumatic brain injury) ([facility] (7/25/2019)   - at baseline from my prior experience with him   - Palliative Care input appreciated, to d/w mother who apparently has guardianship    -remains full code.       Anemia (7/28/2019)   - Hgb low but stable, monitor and transfused when HB<7      Seizure ([facility] (8/19/2019)   - loaded with Vimpat on admit, continue home dosing   - seizure likely due to acute illness as above      Hypocalcemia /hypophosphetemia likely d/t vitamin D def:     -continue on Vitamin D 5000 unit daily    Total time spent in patient care: 25 minutes                  Care Plan discussed with: Patient,  Nursing Staff     Discussed:  Code Status, Care Plan     Prophylaxis:  Lovenox    Disposition:   PT, OT, RN           ___________________________________________________    Attending Physician: Arjun Teague MD

## 2020-02-02 NOTE — PROGRESS NOTES
Chart accessed to assist primary RN. Took critical value of Vancomycin 22, Candice Trejo. RN made aware and to make appropriate personnel aware.

## 2020-02-02 NOTE — PROGRESS NOTES
SHIFT CHANGE:  1930 Bedside and Verbal shift change report given to Merissa RN (oncoming nurse) by Maisha Crawford RN/Cecilia RN (offgoing nurse). Report included the following information SBAR, Kardex, MAR and Recent Results. SHIFT SUMMARY:  1900  Patient resting well, appears comfortable. Primary Nurse Kami Gutierrez RN and Ana Harris RN performed a dual skin assessment on this patient Impairment noted- see wound doc flow sheet  Bhupinder score is 15  1950  Patient assessed, vs stable. Pills crushed and given in apple sauce. 0000  Patient resting, VS stable, no new issues to report. 0330  Noticed that urinary output slowed despite maintenance fluids. Bladder scan >580, requested straight cath order from Dr. Misbah Colmenares.  Awaiting orders. 7683  Order for straight cath given. 0410  Upon returning to perform straight cath, patient voided independently 650 ml's. VS stable, blood drawn. Patient looks comfortable. END OF SHIFT REPORT:  0700  Bedside and Verbal shift change report given to Lisa Awad (oncoming nurse) by Maisha Crawford (offgoing nurse). Report included the following information SBAR, Kardex, MAR and Recent Results.

## 2020-02-03 ENCOUNTER — APPOINTMENT (OUTPATIENT)
Dept: CT IMAGING | Age: 35
DRG: 871 | End: 2020-02-03
Attending: HOSPITALIST
Payer: MEDICARE

## 2020-02-03 LAB
ALBUMIN SERPL-MCNC: 2.3 G/DL (ref 3.5–5)
ANION GAP SERPL CALC-SCNC: 4 MMOL/L (ref 5–15)
BUN SERPL-MCNC: 18 MG/DL (ref 6–20)
BUN/CREAT SERPL: 41 (ref 12–20)
CALCIUM SERPL-MCNC: 7.9 MG/DL (ref 8.5–10.1)
CHLORIDE SERPL-SCNC: 106 MMOL/L (ref 97–108)
CO2 SERPL-SCNC: 30 MMOL/L (ref 21–32)
CREAT SERPL-MCNC: 0.44 MG/DL (ref 0.7–1.3)
ERYTHROCYTE [DISTWIDTH] IN BLOOD BY AUTOMATED COUNT: 19.2 % (ref 11.5–14.5)
GLUCOSE SERPL-MCNC: 99 MG/DL (ref 65–100)
HCT VFR BLD AUTO: 25.3 % (ref 36.6–50.3)
HGB BLD-MCNC: 7.1 G/DL (ref 12.1–17)
MCH RBC QN AUTO: 19.1 PG (ref 26–34)
MCHC RBC AUTO-ENTMCNC: 28.1 G/DL (ref 30–36.5)
MCV RBC AUTO: 68.2 FL (ref 80–99)
NRBC # BLD: 0 K/UL (ref 0–0.01)
NRBC BLD-RTO: 0 PER 100 WBC
PHOSPHATE SERPL-MCNC: 3.4 MG/DL (ref 2.6–4.7)
PLATELET # BLD AUTO: ABNORMAL K/UL (ref 150–400)
POTASSIUM SERPL-SCNC: 4.2 MMOL/L (ref 3.5–5.1)
RBC # BLD AUTO: 3.71 M/UL (ref 4.1–5.7)
SODIUM SERPL-SCNC: 140 MMOL/L (ref 136–145)
WBC # BLD AUTO: 3.7 K/UL (ref 4.1–11.1)

## 2020-02-03 PROCEDURE — 74011250636 HC RX REV CODE- 250/636: Performed by: EMERGENCY MEDICINE

## 2020-02-03 PROCEDURE — 74011250637 HC RX REV CODE- 250/637: Performed by: INTERNAL MEDICINE

## 2020-02-03 PROCEDURE — 80069 RENAL FUNCTION PANEL: CPT

## 2020-02-03 PROCEDURE — 36415 COLL VENOUS BLD VENIPUNCTURE: CPT

## 2020-02-03 PROCEDURE — 74011250636 HC RX REV CODE- 250/636: Performed by: INTERNAL MEDICINE

## 2020-02-03 PROCEDURE — 74011250636 HC RX REV CODE- 250/636

## 2020-02-03 PROCEDURE — 71250 CT THORAX DX C-: CPT

## 2020-02-03 PROCEDURE — 85027 COMPLETE CBC AUTOMATED: CPT

## 2020-02-03 PROCEDURE — 77030018846 HC SOL IRR STRL H20 ICUM -A

## 2020-02-03 PROCEDURE — C9113 INJ PANTOPRAZOLE SODIUM, VIA: HCPCS | Performed by: INTERNAL MEDICINE

## 2020-02-03 PROCEDURE — 74011000258 HC RX REV CODE- 258: Performed by: EMERGENCY MEDICINE

## 2020-02-03 PROCEDURE — 65660000000 HC RM CCU STEPDOWN

## 2020-02-03 RX ORDER — ONDANSETRON 2 MG/ML
INJECTION INTRAMUSCULAR; INTRAVENOUS
Status: COMPLETED
Start: 2020-02-03 | End: 2020-02-03

## 2020-02-03 RX ORDER — ONDANSETRON 2 MG/ML
4 INJECTION INTRAMUSCULAR; INTRAVENOUS
Status: DISCONTINUED | OUTPATIENT
Start: 2020-02-03 | End: 2020-02-06 | Stop reason: HOSPADM

## 2020-02-03 RX ADMIN — DANTROLENE SODIUM 100 MG: 25 CAPSULE ORAL at 22:18

## 2020-02-03 RX ADMIN — METRONIDAZOLE 500 MG: 500 INJECTION, SOLUTION INTRAVENOUS at 21:02

## 2020-02-03 RX ADMIN — BROMOCRIPTINE MESYLATE 5 MG: 2.5 TABLET ORAL at 08:59

## 2020-02-03 RX ADMIN — LACOSAMIDE 200 MG: 100 TABLET, FILM COATED ORAL at 08:59

## 2020-02-03 RX ADMIN — CEFEPIME HYDROCHLORIDE 2 G: 2 INJECTION, POWDER, FOR SOLUTION INTRAVENOUS at 17:13

## 2020-02-03 RX ADMIN — VANCOMYCIN HYDROCHLORIDE 1250 MG: 1.25 INJECTION, POWDER, LYOPHILIZED, FOR SOLUTION INTRAVENOUS at 03:37

## 2020-02-03 RX ADMIN — ONDANSETRON 4 MG: 2 INJECTION INTRAMUSCULAR; INTRAVENOUS at 21:05

## 2020-02-03 RX ADMIN — PANTOPRAZOLE SODIUM 40 MG: 40 INJECTION, POWDER, FOR SOLUTION INTRAVENOUS at 08:58

## 2020-02-03 RX ADMIN — DANTROLENE SODIUM 100 MG: 25 CAPSULE ORAL at 08:59

## 2020-02-03 RX ADMIN — METRONIDAZOLE 500 MG: 500 INJECTION, SOLUTION INTRAVENOUS at 09:20

## 2020-02-03 RX ADMIN — CHOLECALCIFEROL TAB 125 MCG (5000 UNIT) 5000 UNITS: 125 TAB at 08:59

## 2020-02-03 RX ADMIN — VANCOMYCIN HYDROCHLORIDE 1250 MG: 1.25 INJECTION, POWDER, LYOPHILIZED, FOR SOLUTION INTRAVENOUS at 23:06

## 2020-02-03 RX ADMIN — SODIUM CHLORIDE 75 ML/HR: 900 INJECTION, SOLUTION INTRAVENOUS at 03:39

## 2020-02-03 RX ADMIN — VANCOMYCIN HYDROCHLORIDE 1250 MG: 1.25 INJECTION, POWDER, LYOPHILIZED, FOR SOLUTION INTRAVENOUS at 18:16

## 2020-02-03 RX ADMIN — LACOSAMIDE 200 MG: 100 TABLET, FILM COATED ORAL at 17:13

## 2020-02-03 RX ADMIN — Medication 10 ML: at 21:02

## 2020-02-03 RX ADMIN — TRAZODONE HYDROCHLORIDE 100 MG: 100 TABLET ORAL at 22:14

## 2020-02-03 RX ADMIN — CEFEPIME HYDROCHLORIDE 2 G: 2 INJECTION, POWDER, FOR SOLUTION INTRAVENOUS at 06:14

## 2020-02-03 RX ADMIN — DANTROLENE SODIUM 100 MG: 25 CAPSULE ORAL at 17:29

## 2020-02-03 RX ADMIN — DOCUSATE SODIUM 100 MG: 100 CAPSULE, LIQUID FILLED ORAL at 17:13

## 2020-02-03 RX ADMIN — VANCOMYCIN HYDROCHLORIDE 1250 MG: 1.25 INJECTION, POWDER, LYOPHILIZED, FOR SOLUTION INTRAVENOUS at 11:34

## 2020-02-03 RX ADMIN — Medication 10 ML: at 17:30

## 2020-02-03 RX ADMIN — SENNOSIDES 8.6 MG: 8.6 TABLET, FILM COATED ORAL at 08:59

## 2020-02-03 RX ADMIN — Medication 10 ML: at 06:14

## 2020-02-03 RX ADMIN — BROMOCRIPTINE MESYLATE 5 MG: 2.5 TABLET ORAL at 17:13

## 2020-02-03 RX ADMIN — DOCUSATE SODIUM 100 MG: 100 CAPSULE, LIQUID FILLED ORAL at 08:59

## 2020-02-03 NOTE — WOUND CARE
30 y.o. male with PMH including TBI, seizure disorder and Anemia. Admitted for evaluation of GI bleed. Wound care consulted for low Bhupinder (14) Hx of TBI and noted open area to the coccyx. Seen on last hospital admission, patient has a spot to the right buttock that \"comes and goes\" per family.  
  
Assessment: 
Patient is resting in bed with Bedside RN in room to assist with assessment. He is scratching at the right neck and arm consistently with superficial excoriations. ON assessment there is a 2cm patchy/moist area to the right inner glute with a small open fissure. The surrounding skin is macerated, likely moisture related and chronic as noted by family. Pink discoloration with minimal blanching to the quin-wound skin. Heels are pink/blanching (patient is in heel boots). Condom cath in place secondary to incontinence. Specialty bed surface provided for patient. Bedside RN notified.  
  
Recommendation: 
Daily with skin care apply lotion to extremities and bony prominences for protection from friction/shear. Apply Zinc barrier to the gluteal areas daily and as needed with moisture. Float heels and protect (with heel Boots). Turn Q2h while in bed (with turn team). Protect from lines and devices. When up in chair use a waffle cushion and reposition every 20 minutes. To the right inner gluteal area apply zinc barrier to the site and cover with foam border dressing.  Change dressing every 2 days and if moist.  
  
Consult as needed,  
Kuldeep DELGADILLON RN [facility], [facility].

## 2020-02-03 NOTE — PROGRESS NOTES
[facility]     Name: Natalie Yanes MRN: [id number]   : 1985 Hospital: [address]   Date: 2/3/2020        Impression Plan   1. Septic shock, off pressors   2. Fevers  3. Active seizures on admission with a hx of seizure disorder  4. Traumatic brain injury  5. Small right loculated effusion s/p pigtail . Fluid analysis not c/w empyema  6. dysphagia               · Supplemental O2 as needed to keep sats > 88%. Currently on RA  · Continue vanc, cefepime and flagyl  · Follow cutures  · Repeat chest CT pending  · Has now been admitted twice in the last 3 months w/ pneumonia. May be aspiration in the setting of seizures though should probably have MBS att this point as he is still full code               Radiology  ( personally reviewed) CXR reviewed: small chronic right pleural effusion, otherwise clear. ABG No results for input(s): PHI, PO2I, PCO2I in the last 72 hours. Subjective     Cc: hypotension    29 yo with PMHx of TBI presenting with fevers/seizures. Per pts father pt started having fevers over the weekend. Increasingly more lethargic and overnight had long seizures lasting about 10 min. Pt has had ativan doses to break seizures at this point, but father reports him being lethargic before this. Had some vomiting leading up to this as well. No diarrhea. Pt has issues with chronic constipation. Had a caretaker that was sick 2 weeks ago, otherwise no sick contacts. Last hospitalized  with similar sxs. All cultures neg at that time.      Overnight events:  Afebrile  awake and interactive  Pigtail output 34cc      Past Medical History:   Diagnosis Date    Anemia 2019    History of vascular access device 2020    5F DOUBLE LUMEN PICC PLACEC BY ROSA MCGARRY RN R BRACHIAL 38 CM, NO DIFFICULTY    Neurological disorder     traumatic brain injury    Seizures ([facility] [address]     TBI (traumatic brain injury) [facility])       Past Surgical History:   Procedure Laterality Date    HX CRANIOTOMY        Prior to Admission medications    Medication Sig Start Date End Date Taking? Authorizing Provider   traZODone (DESYREL) 100 mg tablet Take 100 mg by mouth nightly. Yes Provider, Historical   lacosamide (VIMPAT) 200 mg tab tablet Take 1 Tab by mouth two (2) times a day. Max Daily Amount: 400 mg. 12/26/19  Yes Yunier Stanford MD   diazePAM (VALIUM) 5 mg tablet Take 0.5 Tabs by mouth every eight (8) hours as needed (for excssive spasticity or seizure). Max Daily Amount: 7.5 mg. 12/26/19  Yes Yunier Stanford MD   aspirin-acetaminophen-caffeine [facility] ES) [phone] mg per tablet Take 2 Tabs by mouth every six (6) hours as needed for Pain or Headache. Do not use with ibuprofen   Yes Provider, Historical   cholecalciferol, vitamin D3, (VITAMIN D3) 2,000 unit tab Take 2,000 Units by mouth daily. Yes Provider, Historical   omega 3-DHA-EPA-fish oil 1,000 mg (120 mg-180 mg) capsule Take 1 Cap by mouth every evening. Yes Provider, Historical   Lactobacillus acidophilus (ACIDOPHILUS) cap Take 1 Cap by mouth daily. Before morning turn   Yes Provider, Historical   bromocriptine mesylate (BROMOCRIPTINE PO) Take 5 mg by mouth four (4) times daily. Patient takes at 8am, 2pm, 6pm, 8pm   Yes Provider, Historical   dantrolene (DANTRIUM) 100 mg capsule Take 100 mg by mouth four (4) times daily. Patient takes at 8am, 2pm, 6pm, 8pm   Yes Provider, Historical   senna (SENNA) 8.6 mg tablet Take 2 Tabs by mouth nightly. Yes Provider, Historical   pantoprazole (PROTONIX) 40 mg tablet Take 40 mg by mouth daily. Before morning turn   Yes Provider, Historical   ibuprofen (MOTRIN) 600 mg tablet Take 600 mg by mouth every four (4) hours as needed for Pain. Yes Provider, Historical   raNITIdine (ZANTAC) 150 mg tablet Take 150 mg by mouth two (2) times daily as needed for Indigestion.    Yes Provider, Historical   bisacodyl (DULCOLAX) 10 mg suppository Insert 10 mg into rectum daily as needed (constipation). 7/31/19  Yes Loren Allen MD   docusate sodium 100 mg tab Take 100 mg by mouth nightly. 7/31/19  Yes Loren Allen MD     Current Facility-Administered Medications   Medication Dose Route Frequency    [START ON 2/4/2020] pantoprazole (PROTONIX) 40 mg in 0.9% sodium chloride 10 mL injection  40 mg IntraVENous DAILY    cholecalciferol (VITAMIN D3) tablet 5,000 Units  5,000 Units Oral DAILY    vancomycin (VANCOCIN) 1,250 mg in 0.9% sodium chloride (MBP/ADV) 250 mL  1,250 mg IntraVENous Q6H    metroNIDAZOLE (FLAGYL) IVPB premix 500 mg  500 mg IntraVENous Q12H    senna (SENOKOT) tablet 8.6 mg  1 Tab Oral DAILY    docusate sodium (COLACE) capsule 100 mg  100 mg Oral BID    sodium chloride (NS) flush 5-40 mL  5-40 mL IntraVENous Q8H    0.9% sodium chloride infusion  75 mL/hr IntraVENous CONTINUOUS    bromocriptine (PARLODEL) tablet 5 mg  5 mg Oral QID    dantrolene (DANTRIUM) capsule 100 mg  100 mg Oral QID    lacosamide (VIMPAT) tablet 200 mg  200 mg Oral BID    traZODone (DESYREL) tablet 100 mg  100 mg Oral QHS    cefepime (MAXIPIME) 2 g in 0.9% sodium chloride (MBP/ADV) 100 mL  2 g IntraVENous Q8H     No Known Allergies   Social History     Tobacco Use    Smoking status: Never Smoker    Smokeless tobacco: Never Used   Substance Use Topics    Alcohol use: No      Family History   Problem Relation Age of Onset    No Known Problems Other         reviewed. Patient does not know          Laboratory: I have personally reviewed the critical care flowsheet and labs. Recent Labs     02/03/20 0335 02/02/20 0426 02/01/20  0435   WBC 3.7* 4.2 5.5   HGB 7.1* 7.6* 7.7*   HCT 25.3* 26.8* 27.9*   PLT UNABLE TO REPORT ACCURATE COUNT DUE TO PLATELET AGGREGATION, HOWEVER, PLATELETS APPEAR DECREASED IN NUMBER ON SMEAR.   PLEASE RESUBMIT SODIUM CITRATE (BLUE) AND EDTA (LAVENDAR) TUBES FOR HEMATOLOGICAL TESTING. 117* 77*     Recent Labs     02/03/20  0335 02/02/20  0426 02/01/20  0435    137 137   K 4.2 4.1 3.9    105 108   CO2 30 26 23   GLU 99 101* 121*   BUN 18 15 12   CREA 0.44* 0.40* 0.40*   CA 7.9* 8.0* 7.2*   MG  --   --  2.0   PHOS 3.4 3.0 2.0*   ALB 2.3* 2.4*  --        Objective:     Mode Rate Tidal Volume Pressure FiO2 PEEP                    Vital Signs:     TMAX(24)      Intake/Output:   Last shift:         Last 3 shifts: 02/03 0701 - 02/03 1900  In: 265 [I.V.:265]  Out: 1125 [Urine:1125]RRIOLAST3    Intake/Output Summary (Last 24 hours) at 2/3/2020 1644  Last data filed at 2/3/2020 0920  Gross per 24 hour   Intake 3285 ml   Output 2409 ml   Net 876 ml     EXAM:   GENERAL: awake, interactive chronic contractures,  HEENT:  PERRL, EOMI, no alar flaring or epistaxis, oral mucosa moist without cyanosis, NECK:  no jugular vein distention, no retractions, no thyromegaly or masses, LUNGS: CTA, no w/r/r, HEART:  Regular rate and rhythm with no MGR; no edema is present, ABDOMEN:  soft with no tenderness, bowel sounds present, EXTREMITIES:  warm with no cyanosis, SKIN:  no jaundice or ecchymosis and NEUROLOGIC:  Moving upper extremeties,    Ramses Coyne MD  [facility] [place]

## 2020-02-03 NOTE — PROGRESS NOTES
Elfe[address] Jansen [address]  [address]  [phone]      Medical Progress Note      NAME: Vianey Giordano   :  1985  MRM:  [id number]    Date/Time: 2/3/2020  8:38 AM          Subjective:     Chief Complaint:  Non-verbal: no family at bedside  Pt's caretaker at bedside  Pt eating   Care taker states pt's cough was gone but now again has cough     ROS:    Unable to obtain          Objective:       Vitals:          Last 24hrs VS reviewed since prior progress note.  Most recent are:    Visit Vitals  /85 (BP 1 Location: Left leg, BP Patient Position: At rest)   Pulse (!) 103   Temp 98.1 °F (36.7 °C)   Resp 22   Ht 6' 2.02\" (1.88 m)   Wt 61.9 kg (136 lb 7.4 oz)   SpO2 96%   BMI 17.51 kg/m²     SpO2 Readings from Last 6 Encounters:   20 96%   19 97%   19 90%   19 92%   19 100%   18 99%            Intake/Output Summary (Last 24 hours) at 2/3/2020 1434  Last data filed at 2/3/2020 0920  Gross per 24 hour   Intake 3285 ml   Output 3109 ml   Net 176 ml          Exam:     Physical Exam:    Gen:    Well-developed, thin, frail, chronically ill-appearing, in no acute distress  HEENT:  Pink conjunctivae,   Neck:  Supple, without masses, thyroid non-tender  Resp:  diminished bs with chest tube inserted in right chest  Card:   No murmurs, normal S1, S2 without thrills, bruits or peripheral edema  Abd:  Soft, non-tender, non-distended, normoactive bowel sounds are present,   Musc:  No cyanosis or clubbing  Skin:   No rashes or ulcers, skin turgor is goodd  Neuro:  Non-verbal and contracted, follows very simple commands   Psych:  No insight, not oriented to person, place and time, alert       Telemetry reviewed:   normal sinus rhythm    Medications Reviewed: (see below)    Lab Data Reviewed: (see below)    ______________________________________________________________________    Medications:     Current Facility-Administered Medications Medication Dose Route Frequency    [START ON 2/4/2020] pantoprazole (PROTONIX) 40 mg in 0.9% sodium chloride 10 mL injection  40 mg IntraVENous DAILY    cholecalciferol (VITAMIN D3) tablet 5,000 Units  5,000 Units Oral DAILY    vancomycin (VANCOCIN) 1,250 mg in 0.9% sodium chloride (MBP/ADV) 250 mL  1,250 mg IntraVENous Q6H    metroNIDAZOLE (FLAGYL) IVPB premix 500 mg  500 mg IntraVENous Q12H    senna (SENOKOT) tablet 8.6 mg  1 Tab Oral DAILY    docusate sodium (COLACE) capsule 100 mg  100 mg Oral BID    bisacodyL (DULCOLAX) suppository 10 mg  10 mg Rectal DAILY PRN    sodium chloride (NS) flush 5-40 mL  5-40 mL IntraVENous Q8H    sodium chloride (NS) flush 5-40 mL  5-40 mL IntraVENous PRN    sodium chloride (NS) flush 5-10 mL  5-10 mL IntraVENous PRN    0.9% sodium chloride infusion  75 mL/hr IntraVENous CONTINUOUS    alteplase (CATHFLO) 1 mg in sterile water (preservative free) 1 mL injection  1 mg InterCATHeter PRN    bromocriptine (PARLODEL) tablet 5 mg  5 mg Oral QID    dantrolene (DANTRIUM) capsule 100 mg  100 mg Oral QID    diazePAM (VALIUM) tablet 2.5 mg  2.5 mg Oral Q8H PRN    lacosamide (VIMPAT) tablet 200 mg  200 mg Oral BID    traZODone (DESYREL) tablet 100 mg  100 mg Oral QHS    cefepime (MAXIPIME) 2 g in 0.9% sodium chloride (MBP/ADV) 100 mL  2 g IntraVENous Q8H            Lab Review:     Recent Labs     02/03/20  0335 02/02/20 0426 02/01/20  0435   WBC 3.7* 4.2 5.5   HGB 7.1* 7.6* 7.7*   HCT 25.3* 26.8* 27.9*   PLT UNABLE TO REPORT ACCURATE COUNT DUE TO PLATELET AGGREGATION, HOWEVER, PLATELETS APPEAR DECREASED IN NUMBER ON SMEAR.   PLEASE RESUBMIT SODIUM CITRATE (BLUE) AND EDTA (LAVENDAR) TUBES FOR HEMATOLOGICAL TESTING. 117* 77*     Recent Labs     02/03/20  0335 02/02/20  0426 02/01/20  0435    137 137   K 4.2 4.1 3.9    105 108   CO2 30 26 23   GLU 99 101* 121*   BUN 18 15 12   CREA 0.44* 0.40* 0.40*   CA 7.9* 8.0* 7.2*   MG  --   --  2.0   PHOS 3.4 3.0 2.0*   ALB 2. 3* 2.4*  --      Lab Results   Component Value Date/Time    Glucose (POC) 103 (H) 12/23/2019 05:41 PM    Glucose (POC) 96 08/19/2019 08:49 AM    Glucose (POC) 92 07/29/2019 06:17 AM    Glucose (POC) 70 07/29/2019 03:09 AM    Glucose (POC) 83 07/23/2019 03:47 PM     No results for input(s): PH, PCO2, PO2, HCO3, FIO2 in the last 72 hours. No results for input(s): INR, INREXT, INREXT in the last 72 hours. No results found for: SDES  Lab Results   Component Value Date/Time    Culture result: NO GROWTH 3 DAYS 01/31/2020 05:05 PM    Culture result: NO FUNGUS ISOLATED 3 DAYS 01/31/2020 05:05 PM    Culture result: MRSA PRESENT (A) 01/29/2020 03:13 PM    Culture result:  01/29/2020 03:13 PM     CALLED TO AND READ BACK BY  Shantel Lal AT 2327 ON 1/30/20. PJ      Culture result:  01/29/2020 03:13 PM         Screening of patient nares for MRSA is for surveillance purposes and, if positive, to facilitate isolation considerations in high risk settings. It is not intended for automatic decolonization interventions per se as regimens are not sufficiently effective to warrant routine use. Assessment:     Principal Problem:    Pneumonia (1/31/2020)    Active Problems:    TBI (traumatic brain injury) ([facility] [address] (7/25/2019)      Anemia (7/28/2019)      Seizure ([facility] [address] (8/19/2019)      GI bleed (1/29/2020)      Septic shock ([facility] [address] (1/31/2020)           Plan:     Principal Problem:    Pneumonia   - with empyema based on CT   - s/p IR guided chest tube placement.  Draining well   - Pleural fluid cultures: no growth so fare   - continue vancomycin, cefepime and flagyl per pulmonary              - appreciate pulmonary input   - Repeat CT chest to evaluate Pigtail, minimal output in 24 hours     Active Problems:    GI bleed (1/29/2020)   - Hgb low but stable, GI input appreciated              - check iron profile and transfuse once HB<7   - GI signed off at this time        SIRS (systemic inflammatory response syndrome) ([facility] [address] (1/29/2020)   - pneumonia    - BP is stable. Patient is off levophed and continue IVF to 75 ml/hr      TBI (traumatic brain injury) ([facility] [address] (7/25/2019)   - at baseline from my prior experience with him   - Palliative Care input appreciated, to d/w mother who apparently has guardianship    -remains full code.       Anemia (7/28/2019)   - Hgb low but stable, monitor and transfused when HB<7      Seizure ([facility] [address] (8/19/2019)   - loaded with Vimpat on admit, continue home dosing   - seizure likely due to acute illness as above      Hypocalcemia /hypophosphetemia likely d/t vitamin D def:     -continue on Vitamin D 5000 unit daily    Total time spent in patient care: 30 minutes                  Care Plan discussed with: Patient,  Nursing Staff     Discussed:  Code Status, Care Plan     Prophylaxis:  SCDs    Disposition:   PT, OT, RN           ___________________________________________________    Attending Physician: Jose Bobby MD

## 2020-02-03 NOTE — PROGRESS NOTES
[address] NP  [phone]           GI PROGRESS NOTE        NAME: Yolanda Nayak   :  1985   MRN:  [id number]       Subjective:   Non Verbal.  Nursing reports no melena or vomiting. Objective:   NAD      VITALS:   Last 24hrs VS reviewed since prior progress note. Most recent are:  Visit Vitals  /85 (BP 1 Location: Left leg, BP Patient Position: At rest)   Pulse (!) 103   Temp 98.1 °F (36.7 °C)   Resp 22   Ht 6' 2.02\" (1.88 m)   Wt 61.9 kg (136 lb 7.4 oz)   SpO2 96%   BMI 17.51 kg/m²       Intake/Output Summary (Last 24 hours) at 2/3/2020 1310  Last data filed at 2/3/2020 0920  Gross per 24 hour   Intake 3525 ml   Output 3109 ml   Net 416 ml       PHYSICAL EXAM:  General: Alert, in no acute distress    HEENT: Anicteric sclerae. Lungs:            CTA Bilaterally. Heart:  Regular  rhythm,    Abdomen: Soft, Non distended, Non tender.  (+)Bowel sounds, no HSM  Extremities: No c/c/e  Neurologic:  CN 2-12 gi, Alert  No acute neurological distress   Psych:   Not anxious nor agitated. Lab Data Reviewed:   Recent Labs     20   WBC 3.7* 4.2   HGB 7.1* 7.6*   HCT 25.3* 26.8*   PLT UNABLE TO REPORT ACCURATE COUNT DUE TO PLATELET AGGREGATION, HOWEVER, PLATELETS APPEAR DECREASED IN NUMBER ON SMEAR. PLEASE RESUBMIT SODIUM CITRATE (BLUE) AND EDTA (LAVENDAR) TUBES FOR HEMATOLOGICAL TESTING. 117*     Recent Labs     205 20    137   K 4.2 4.1    105   CO2 30 26   BUN 18 15   CREA 0.44* 0.40*   GLU 99 101*   PHOS 3.4 3.0   CA 7.9* 8.0*     Recent Labs     205 20   ALB 2.3* 2.4*       ________________________________________________________________________  Patient Active Problem List   Diagnosis Code    Complicated UTI (urinary tract infection) N39.0    [facility] S06. 9X9A    Anemia D64.9    Renal insufficiency N28.9    Seizure (HCC) R56.9    GI bleed K92.2  Pneumonia J18.9    Septic shock (HCC) A41.9, R65.21         Assessment and Plan:  GI Bleeding:  No further coffee ground emesis or melena since admission.    - Diet per speech therapy  - Continue daily PPI  - Monitor hemoglobin and transfuse to goal of 7.  - No NSAIDs.  - No plans for endoscopic evaluation.     Will see again on request.  Please call with any questions or concerns.       Signed By: Tasha Marquez NP     2/3/2020  1:10 PM

## 2020-02-03 NOTE — PROGRESS NOTES
1900: Bedside and Verbal shift change report given to Merissa BAILEY RN (oncoming nurse) by Heriberto Darnell RN (offgoing nurse). Report included the following information SBAR, Kardex, Intake/Output, Recent Results, Cardiac Rhythm ST and Alarm Parameters . Primary Nurse Anjelica Calderon RN and Heriberto Darnell RN performed a dual skin assessment on this patient Impairment noted- see wound doc flow sheet. Bhupinder score is 16.  2000: Shift  Assessment completed. Pt repositioned. Mental Status: Pt is nonverbal. Follows commands. Moves all extremities. Respiratory: Lungs CTA on RA. Chest tube to continuous suction. Cardiac: ST on the monitor. GI/: Puree diet, nectar thick liquids. Active BS. Condom cath in place. IV Lines/Drips: RUE PICC. NS 75ml/hr. 2100: Medications given in applesauce. 2200: Pt repositioned. 0000: Reassessment completed. No changes from previous assessment. Pt had large BM. Full CHG bath with mouth care completed. Condom cath came off, replaced and draining into bag. Pt repositioned. 0200: Pt repositioned. 0335: Labs drawn. 0400: Reassessment completed. No changes from previous assessment. Pt repositioned. 0600: Pt repositioned. 0700: Bedside and Verbal shift change report given to Yane Orellana (oncoming nurse) by Jony Acuña RN (offgoing nurse). Report included the following information SBAR, Kardex, Intake/Output, Recent Results, Cardiac Rhythm SR and Alarm Parameters .
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
## 2020-02-03 NOTE — ROUTINE PROCESS
0710: Bedside shift change report received from Merissa BAILEY RN and care assumed at this time. Report included the following information SAB, Kardex, I&O's, MAR,.  
 
0800: Assessment completed at this time. 0900: pt at 100% of breakfast, no issues noted 0930: assisted w/ Neymar Whitley RN w/ wound care doing skin assessmet. 1015: Call to Mother Khris Das, to update about PT's transfer to RM: [id number]: TRANSFER - OUT REPORT: 
 
Verbal report given to MELVIN Hoyt on Yolanda Nayak  being transferred to [facility], RM: 332(unit) for routine progression of care. Report consisted of patients Situation, Background, Assessment and  
Recommendations(SBAR). Information from the following report(s) Opportunity for questions and clarification was provided. 1100: Patient transported on monitor w/ Tram Rosado, and oncoming nurse MELVIN Hoyt. Care handed off at this time. Personal caregiver at bedside aware of transfer to new room.

## 2020-02-04 ENCOUNTER — APPOINTMENT (OUTPATIENT)
Dept: GENERAL RADIOLOGY | Age: 35
DRG: 871 | End: 2020-02-04
Payer: MEDICARE

## 2020-02-04 LAB
ANION GAP SERPL CALC-SCNC: 7 MMOL/L (ref 5–15)
BACTERIA SPEC CULT: NORMAL
BASOPHILS # BLD: 0 K/UL (ref 0–0.1)
BASOPHILS NFR BLD: 0 % (ref 0–1)
BUN SERPL-MCNC: 16 MG/DL (ref 6–20)
BUN/CREAT SERPL: 36 (ref 12–20)
CALCIUM SERPL-MCNC: 8 MG/DL (ref 8.5–10.1)
CHLORIDE SERPL-SCNC: 104 MMOL/L (ref 97–108)
CO2 SERPL-SCNC: 26 MMOL/L (ref 21–32)
CREAT SERPL-MCNC: 0.45 MG/DL (ref 0.7–1.3)
DIFFERENTIAL METHOD BLD: ABNORMAL
EOSINOPHIL # BLD: 0.4 K/UL (ref 0–0.4)
EOSINOPHIL NFR BLD: 8 % (ref 0–7)
ERYTHROCYTE [DISTWIDTH] IN BLOOD BY AUTOMATED COUNT: 19.1 % (ref 11.5–14.5)
GLUCOSE SERPL-MCNC: 97 MG/DL (ref 65–100)
GRAM STN SPEC: NORMAL
GRAM STN SPEC: NORMAL
HCT VFR BLD AUTO: 25.5 % (ref 36.6–50.3)
HGB BLD-MCNC: 7.3 G/DL (ref 12.1–17)
IMM GRANULOCYTES # BLD AUTO: 0 K/UL (ref 0–0.04)
IMM GRANULOCYTES NFR BLD AUTO: 0 % (ref 0–0.5)
LYMPHOCYTES # BLD: 0.9 K/UL (ref 0.8–3.5)
LYMPHOCYTES NFR BLD: 19 % (ref 12–49)
MCH RBC QN AUTO: 19.2 PG (ref 26–34)
MCHC RBC AUTO-ENTMCNC: 28.6 G/DL (ref 30–36.5)
MCV RBC AUTO: 67.1 FL (ref 80–99)
MONOCYTES # BLD: 0.6 K/UL (ref 0–1)
MONOCYTES NFR BLD: 14 % (ref 5–13)
NEUTS SEG # BLD: 2.7 K/UL (ref 1.8–8)
NEUTS SEG NFR BLD: 59 % (ref 32–75)
NRBC # BLD: 0 K/UL (ref 0–0.01)
NRBC BLD-RTO: 0 PER 100 WBC
PLATELET # BLD AUTO: 77 K/UL (ref 150–400)
PMV BLD AUTO: 9.5 FL (ref 8.9–12.9)
POTASSIUM SERPL-SCNC: 4.1 MMOL/L (ref 3.5–5.1)
RBC # BLD AUTO: 3.8 M/UL (ref 4.1–5.7)
RBC MORPH BLD: ABNORMAL
RBC MORPH BLD: ABNORMAL
SERVICE CMNT-IMP: NORMAL
SODIUM SERPL-SCNC: 137 MMOL/L (ref 136–145)
WBC # BLD AUTO: 4.6 K/UL (ref 4.1–11.1)

## 2020-02-04 PROCEDURE — 74011250636 HC RX REV CODE- 250/636: Performed by: INTERNAL MEDICINE

## 2020-02-04 PROCEDURE — 74011250637 HC RX REV CODE- 250/637: Performed by: INTERNAL MEDICINE

## 2020-02-04 PROCEDURE — 77030040831 HC BAG URINE DRNG MDII -A

## 2020-02-04 PROCEDURE — 85025 COMPLETE CBC W/AUTO DIFF WBC: CPT

## 2020-02-04 PROCEDURE — 77030029065 HC DRSG HEMO QCLOT ZMED -B

## 2020-02-04 PROCEDURE — 92611 MOTION FLUOROSCOPY/SWALLOW: CPT

## 2020-02-04 PROCEDURE — 92526 ORAL FUNCTION THERAPY: CPT

## 2020-02-04 PROCEDURE — 80048 BASIC METABOLIC PNL TOTAL CA: CPT

## 2020-02-04 PROCEDURE — 74230 X-RAY XM SWLNG FUNCJ C+: CPT

## 2020-02-04 PROCEDURE — 74011000250 HC RX REV CODE- 250: Performed by: NURSE PRACTITIONER

## 2020-02-04 PROCEDURE — C9113 INJ PANTOPRAZOLE SODIUM, VIA: HCPCS | Performed by: NURSE PRACTITIONER

## 2020-02-04 PROCEDURE — 74011250636 HC RX REV CODE- 250/636: Performed by: HOSPITALIST

## 2020-02-04 PROCEDURE — 36415 COLL VENOUS BLD VENIPUNCTURE: CPT

## 2020-02-04 PROCEDURE — 71045 X-RAY EXAM CHEST 1 VIEW: CPT

## 2020-02-04 PROCEDURE — 74011000258 HC RX REV CODE- 258: Performed by: EMERGENCY MEDICINE

## 2020-02-04 PROCEDURE — 74011000258 HC RX REV CODE- 258: Performed by: HOSPITALIST

## 2020-02-04 PROCEDURE — 74011250636 HC RX REV CODE- 250/636: Performed by: NURSE PRACTITIONER

## 2020-02-04 PROCEDURE — 74011250636 HC RX REV CODE- 250/636: Performed by: EMERGENCY MEDICINE

## 2020-02-04 PROCEDURE — 65660000000 HC RM CCU STEPDOWN

## 2020-02-04 RX ADMIN — DANTROLENE SODIUM 100 MG: 25 CAPSULE ORAL at 14:14

## 2020-02-04 RX ADMIN — BROMOCRIPTINE MESYLATE 5 MG: 2.5 TABLET ORAL at 09:00

## 2020-02-04 RX ADMIN — CEFEPIME HYDROCHLORIDE 2 G: 2 INJECTION, POWDER, FOR SOLUTION INTRAVENOUS at 06:06

## 2020-02-04 RX ADMIN — CEFEPIME HYDROCHLORIDE 2 G: 2 INJECTION, POWDER, FOR SOLUTION INTRAVENOUS at 00:08

## 2020-02-04 RX ADMIN — DANTROLENE SODIUM 100 MG: 25 CAPSULE ORAL at 21:02

## 2020-02-04 RX ADMIN — DANTROLENE SODIUM 100 MG: 25 CAPSULE ORAL at 09:00

## 2020-02-04 RX ADMIN — METRONIDAZOLE 500 MG: 500 INJECTION, SOLUTION INTRAVENOUS at 09:01

## 2020-02-04 RX ADMIN — Medication 10 ML: at 14:15

## 2020-02-04 RX ADMIN — AMPICILLIN SODIUM AND SULBACTAM SODIUM 3 G: 2; 1 INJECTION, POWDER, FOR SOLUTION INTRAMUSCULAR; INTRAVENOUS at 12:16

## 2020-02-04 RX ADMIN — SODIUM CHLORIDE 40 MG: 9 INJECTION INTRAMUSCULAR; INTRAVENOUS; SUBCUTANEOUS at 09:00

## 2020-02-04 RX ADMIN — BROMOCRIPTINE MESYLATE 5 MG: 2.5 TABLET ORAL at 17:37

## 2020-02-04 RX ADMIN — SODIUM CHLORIDE 75 ML/HR: 900 INJECTION, SOLUTION INTRAVENOUS at 04:50

## 2020-02-04 RX ADMIN — DIAZEPAM 2.5 MG: 5 TABLET ORAL at 21:03

## 2020-02-04 RX ADMIN — DANTROLENE SODIUM 100 MG: 25 CAPSULE ORAL at 17:37

## 2020-02-04 RX ADMIN — BROMOCRIPTINE MESYLATE 5 MG: 2.5 TABLET ORAL at 14:14

## 2020-02-04 RX ADMIN — LACOSAMIDE 200 MG: 100 TABLET, FILM COATED ORAL at 17:37

## 2020-02-04 RX ADMIN — LACOSAMIDE 200 MG: 100 TABLET, FILM COATED ORAL at 09:00

## 2020-02-04 RX ADMIN — VANCOMYCIN HYDROCHLORIDE 1250 MG: 1.25 INJECTION, POWDER, LYOPHILIZED, FOR SOLUTION INTRAVENOUS at 10:20

## 2020-02-04 RX ADMIN — CHOLECALCIFEROL TAB 125 MCG (5000 UNIT) 5000 UNITS: 125 TAB at 09:00

## 2020-02-04 RX ADMIN — Medication 10 ML: at 21:03

## 2020-02-04 RX ADMIN — DOCUSATE SODIUM 100 MG: 100 CAPSULE, LIQUID FILLED ORAL at 09:00

## 2020-02-04 RX ADMIN — AMPICILLIN SODIUM AND SULBACTAM SODIUM 3 G: 2; 1 INJECTION, POWDER, FOR SOLUTION INTRAMUSCULAR; INTRAVENOUS at 18:36

## 2020-02-04 RX ADMIN — DOCUSATE SODIUM 100 MG: 100 CAPSULE, LIQUID FILLED ORAL at 17:37

## 2020-02-04 RX ADMIN — TRAZODONE HYDROCHLORIDE 100 MG: 100 TABLET ORAL at 21:03

## 2020-02-04 RX ADMIN — VANCOMYCIN HYDROCHLORIDE 1250 MG: 1.25 INJECTION, POWDER, LYOPHILIZED, FOR SOLUTION INTRAVENOUS at 04:49

## 2020-02-04 RX ADMIN — BROMOCRIPTINE MESYLATE 5 MG: 2.5 TABLET ORAL at 21:03

## 2020-02-04 RX ADMIN — Medication 10 ML: at 06:06

## 2020-02-04 RX ADMIN — SENNOSIDES 8.6 MG: 8.6 TABLET, FILM COATED ORAL at 09:00

## 2020-02-04 NOTE — PROGRESS NOTES
Care Management follow up    Patient admitted for GI bleed, seizure. RUR score 16    Current status  Patient remains on IV antibiotics. MBS completed today, patient changed to dysphagia diet with honey thick liquids. Chest tube pigtail clamped. Patient has caregivers present at home and family very involved in care. Transition of Care Plan  1. Monitor patient for response to treatment and recommendations. 2. Likely home with current care providers. 3. CM to follow.     Juanjo Castellano RN, MSN/Care manager

## 2020-02-04 NOTE — PROGRESS NOTES
Nutrition Assessment:    RECOMMENDATIONS/INTERVENTION(S):   1. Continue Regular diet - texture/ liquid thickness per SLP. 2. Continue magic cup TID (870 kcal, 27 gm protein) and yogurt with every meal to promote PO intake. 3. Send double puree portions per pt's continued hunger cues    4. Monitor GI function, labs, and weight trends. ASSESSMENT:   2/4: Pt seen for f/u. Pt continues doing well with dysphagia 1 and nectars, per SLP. Caretaker in room reports pt is \"demolishing his meals,\" 100% of all items with additional hunger cues. Will send double portions of puree items and continue sending Magic cup and yogurt with meals as well. No reports of GI distress. CBW has increased 10 lb since last visit, bed weight measurements. BMI still c/w underweight at 17.6. Continue increased kcal needs to promote healthy wt. LBM 2/3. Moisture associated skin damage on buttocks noted. Labs: Ca 8.0 L  Meds: NS 75mL/hr, cefepime, D3, colace, flagyl, protonix, vancomycin    1/30: 29 y/o M admitted with GIB. Pt screen for low BMI. PMH - TBI. Pt off Levo. Pt nonverbal at baseline, spoke with caregiver at bedside. At home, pt typically consumed chopped or puree foods based on consistency. Pt eats whatever caregivers/ family is eating but texture altered. Caregiver reports pt has had decreased intake x2 weeks 2/2 difficulties with chopped foods, did better with purees. Noted on previous visit pt received magic cup and yogurt with every meal, caregiver agreeable to continue in-house.  lb. BMI 16.3 c/w underweight. Nutritional needs +250 kcal to promote healthy weight. Caregiver reports pt has experienced weight loss 2/2 decreased intake. Pt with 9% weight loss x1 mo which is severe for timeframe. NKFA. No note of GI distress since admit. LBM 1/28. Skin intact. Labs - K+ 3.2 L, Cr 0.52 L, Ca 7.1 L, Phos 1.2 L. Meds -  ml/hr, pantoprazole, KPhos, KCl.        Diet Order: Puree  % Eaten:    Patient Vitals for the past 72 hrs:   % Diet Eaten   02/03/20 0920 100 %   02/02/20 1800 100 %   02/02/20 1400 100 %   02/02/20 0931 95 %   02/01/20 1700 100 %   02/01/20 1344 100 %       Pertinent Medications: [x] Reviewed    Labs: [x] Reviewed    Anthropometrics: Height: 6' 2.02\" (188 cm) Weight: 62.2 kg (137 lb 2 oz)    IBW (%IBW):   ( ) UBW (%UBW):   (  %)      BMI: Body mass index is 17.6 kg/m². This BMI is indicative of:   [x] Underweight    [] Normal    [] Overweight    []  Obesity    []  Extreme Obesity (BMI>40)  Estimated Nutrition Needs (Based on): 2324 Kcals/day(REE 1596 x 1.3 - 250 kcal) , 65 g(58 - 69 gm (1.0 - 1.2 gm/kg)) Protein  Carbohydrate:  At Least 130 g/day  Fluids: 2324 mL/day (1 ml/kcal)    Last BM: 2/3   [x]Active     []Hyperactive  []Hypoactive       [] Absent   BS  Skin:    [] Intact   [] Incision  [] Breakdown   [] DTI   [] Tears/Excoriation/Abrasion  []Edema [x] Other: Moisture associated skin damage on R buttocks  Wt Readings from Last 30 Encounters:   02/04/20 62.2 kg (137 lb 2 oz)   12/23/19 63.5 kg (140 lb)   08/23/19 63.7 kg (140 lb 6.9 oz)   07/30/19 68 kg (149 lb 14.6 oz)   07/23/19 68 kg (150 lb)   08/07/18 68 kg (150 lb)   05/01/18 65.8 kg (145 lb)   05/01/18 64.9 kg (143 lb)   02/14/17 64.9 kg (143 lb)      NUTRITION DIAGNOSES:   Problem:  Underweight     Etiology: related to decreased ability to consume sufficient energy     Signs/Symptoms: as evidenced by BMI 16.3        2/4: Nutrition Dx continues - BMI 17.6    NUTRITION INTERVENTIONS:  Meals/Snacks: General/healthful diet   Supplements: Commercial supplement              GOAL:   Continue intake >75% +ONS next 5-6 days    Cultural, Taoist, or Ethnic Dietary Needs: None     EDUCATION & DISCHARGE NEEDS:    [x] None Identified   [] Identified and Education Provided/Documented   [] Identified and Pt declined/was not appropriate      [x] Interdisciplinary Care Plan Reviewed/Documented    [x] Discharge Needs:    Regular diet (texture/ liquid thickness per SLP)   [] No Nutrition Related Discharge Needs    NUTRITION RISK:   Pt Is At Nutrition Risk  [x]     No Nutrition Risk Identified  []       PT SEEN FOR:    []  MD Consult: []Calorie Count      []Diabetic Diet Education        []Diet Education     []Electrolyte Management     []General Nutrition Management and Supplements     []Management of Tube Feeding     []TPN Recommendations    []  RN Referral:  []MST score >=2     []Enteral/Parenteral Nutrition PTA     []Pregnant: Gestational DM or Multigestation                 [] Pressure Ulcer    []  Low BMI      []  Length of Stay       [] Dysphagia Diet         [] Ventilator  [x]  Follow-up     Previous Recommendations:   [x] Implemented          [] Not Implemented          [] Not Applicable    Previous Goal:   [x] Met              [] Progressing Towards Goal              [] Not Progressing Towards Goal   [] Not Applicable            Manju Castañeda,   Pager [phone]  Phone [phone]

## 2020-02-04 NOTE — PROGRESS NOTES
[facility] [address]  [address] [facility], [address] [place] [address]  [phone]      Medical Progress Note      NAME: Fay Pardo   :  1985  MRM:  [id number]    Date/Time: 2020  8:38 AM          Subjective:     Chief Complaint:  Non-verbal: no family at bedside  Pt being fed  No overnight issues     ROS:    Unable to obtain          Objective:       Vitals:          Last 24hrs VS reviewed since prior progress note.  Most recent are:    Visit Vitals  /66 (BP 1 Location: Left leg, BP Patient Position: At rest)   Pulse 93   Temp 97.7 °F (36.5 °C)   Resp 16   Ht 6' 2.02\" (1.88 m)   Wt 62.2 kg (137 lb 2 oz)   SpO2 97%   BMI 17.60 kg/m²     SpO2 Readings from Last 6 Encounters:   20 97%   19 97%   19 90%   19 92%   19 100%   18 99%            Intake/Output Summary (Last 24 hours) at 2020 1020  Last data filed at 2020 3742  Gross per 24 hour   Intake 901.25 ml   Output 2380 ml   Net -1478.75 ml          Exam:     Physical Exam:    Gen:    Well-developed, thin, frail, chronically ill-appearing, in no acute distress  HEENT:  Pink conjunctivae,   Neck:  Supple, without masses, thyroid non-tender  Resp:  diminished bs with chest tube inserted in right chest  Card:   No murmurs, normal S1, S2 without thrills, bruits or peripheral edema  Abd:  Soft, non-tender, non-distended, normoactive bowel sounds are present,   Musc:  No cyanosis or clubbing  Skin:   No rashes or ulcers, skin turgor is goodd  Neuro:  Non-verbal and contracted, follows very simple commands   Psych:  No insight, not oriented to person, place and time, alert       Telemetry reviewed:   normal sinus rhythm    Medications Reviewed: (see below)    Lab Data Reviewed: (see below)    ______________________________________________________________________    Medications:     Current Facility-Administered Medications   Medication Dose Route Frequency    pantoprazole (PROTONIX) 40 mg in 0.9% sodium chloride 10 mL injection  40 mg IntraVENous DAILY    ondansetron (ZOFRAN) injection 4 mg  4 mg IntraVENous Q6H PRN    cholecalciferol (VITAMIN D3) tablet 5,000 Units  5,000 Units Oral DAILY    vancomycin (VANCOCIN) 1,250 mg in 0.9% sodium chloride (MBP/ADV) 250 mL  1,250 mg IntraVENous Q6H    metroNIDAZOLE (FLAGYL) IVPB premix 500 mg  500 mg IntraVENous Q12H    senna (SENOKOT) tablet 8.6 mg  1 Tab Oral DAILY    docusate sodium (COLACE) capsule 100 mg  100 mg Oral BID    bisacodyL (DULCOLAX) suppository 10 mg  10 mg Rectal DAILY PRN    sodium chloride (NS) flush 5-40 mL  5-40 mL IntraVENous Q8H    sodium chloride (NS) flush 5-40 mL  5-40 mL IntraVENous PRN    sodium chloride (NS) flush 5-10 mL  5-10 mL IntraVENous PRN    0.9% sodium chloride infusion  75 mL/hr IntraVENous CONTINUOUS    alteplase (CATHFLO) 1 mg in sterile water (preservative free) 1 mL injection  1 mg InterCATHeter PRN    bromocriptine (PARLODEL) tablet 5 mg  5 mg Oral QID    dantrolene (DANTRIUM) capsule 100 mg  100 mg Oral QID    diazePAM (VALIUM) tablet 2.5 mg  2.5 mg Oral Q8H PRN    lacosamide (VIMPAT) tablet 200 mg  200 mg Oral BID    traZODone (DESYREL) tablet 100 mg  100 mg Oral QHS    cefepime (MAXIPIME) 2 g in 0.9% sodium chloride (MBP/ADV) 100 mL  2 g IntraVENous Q8H            Lab Review:     Recent Labs     02/04/20 0456 02/03/20  0335 02/02/20 0426   WBC 4.6 3.7* 4.2   HGB 7.3* 7.1* 7.6*   HCT 25.5* 25.3* 26.8*   PLT 77* UNABLE TO REPORT ACCURATE COUNT DUE TO PLATELET AGGREGATION, HOWEVER, PLATELETS APPEAR DECREASED IN NUMBER ON SMEAR.   PLEASE RESUBMIT SODIUM CITRATE (BLUE) AND EDTA (LAVENDAR) TUBES FOR HEMATOLOGICAL TESTING. 117*     Recent Labs     02/04/20 0456 02/03/20  0335 02/02/20 0426    140 137   K 4.1 4.2 4.1    106 105   CO2 26 30 26   GLU 97 99 101*   BUN 16 18 15   CREA 0.45* 0.44* 0.40*   CA 8.0* 7.9* 8.0*   PHOS  --  3.4 3.0   ALB  --  2.3* 2.4*     Lab Results   Component Value Date/Time    Glucose (POC) 103 (H) 12/23/2019 05:41 PM    Glucose (POC) 96 08/19/2019 08:49 AM    Glucose (POC) 92 07/29/2019 06:17 AM    Glucose (POC) 70 07/29/2019 03:09 AM    Glucose (POC) 83 07/23/2019 03:47 PM     No results for input(s): PH, PCO2, PO2, HCO3, FIO2 in the last 72 hours. No results for input(s): INR, INREXT, INREXT in the last 72 hours. No results found for: SDES  Lab Results   Component Value Date/Time    Culture result: NO GROWTH 4 DAYS 01/31/2020 05:05 PM    Culture result: NO FUNGUS ISOLATED 3 DAYS 01/31/2020 05:05 PM    Culture result: MRSA PRESENT (A) 01/29/2020 03:13 PM    Culture result:  01/29/2020 03:13 PM     CALLED TO AND READ BACK BY  Jocelyn Gomez AT 2327 ON 1/30/20. PJ      Culture result:  01/29/2020 03:13 PM         Screening of patient nares for MRSA is for surveillance purposes and, if positive, to facilitate isolation considerations in high risk settings. It is not intended for automatic decolonization interventions per se as regimens are not sufficiently effective to warrant routine use. Assessment:     Principal Problem:    Pneumonia (1/31/2020)    Active Problems:    TBI (traumatic brain injury) ([facility] [address] (7/25/2019)      Anemia (7/28/2019)      Seizure ([facility] [address] (8/19/2019)      GI bleed (1/29/2020)      Septic shock ([facility] [address] (1/31/2020)           Plan:     Principal Problem:    Pneumonia, possibly aspiration    - no empyema, Fluid cx negative    - s/p IR guided chest tube placement.  Draining well   - Pleural fluid cultures: no growth so fare   - continue vancomycin, cefepime and flagyl per pulmonary              - appreciate pulmonary input   - Repeat CT chest with nearly resolved Effusion, d/w Pulmonary, plan to remove pigtail  today    - de-escalate antibiotics at this time to Unasyn to complete course 10 days total    - Speech therapy to follow up, provide education to care takers regarding feeds      GI bleed   - Hgb low but stable, GI input appreciated - check iron profile and transfuse once HB<7   - GI signed off at this time      SIRS    - pneumonia    - BP is stable. Patient is off levophed and continue IVF to 75 ml/hr      TBI    - at baseline from my prior experience with him   - Palliative Care input appreciated, to d/w mother who apparently has guardianship    -remains full code.       Anemia    - Hgb low but stable, monitor and transfused when HB<7      Seizure    - loaded with Vimpat on admit, continue home dosing   - seizure likely due to acute illness as above      Hypocalcemia /hypophosphetemia likely d/t vitamin D def:     -continue on Vitamin D 5000 unit daily    Total time spent in patient care: 30 minutes                  Care Plan discussed with: Patient,  Nursing Staff     Discussed:  Code Status, Care Plan     Prophylaxis:  SCDs    Disposition:   PT, OT, RN           ___________________________________________________    Attending Physician: Allegra Isidro MD

## 2020-02-04 NOTE — PROGRESS NOTES
[facility]     Name: Fay Pardo MRN: [id number]   : 1985 Hospital: [address]   Date: 2020        Impression Plan   1. Septic shock, off pressors   2. Fevers  3. Active seizures on admission with a hx of seizure disorder  4. Traumatic brain injury  5. Small right loculated effusion s/p pigtail . Fluid analysis not c/w empyema  6. Dysphagia               · Supplemental O2 as needed to keep sats > 88%. Currently on RA. · Repeat CXR; if it looks okay, will plan to clamp tube and reaccess for possible removal.  · Has now been admitted twice in the last 3 months w/ pneumonia. May be aspiration in the setting of seizures. Check MBS  · Continue vanc, cefepime, and flagyl  · Follow cutures  · Protonix  · SCDs               Radiology  (personally reviewed) Chest CT reviewed: Right-sided subpleural fluid nearly resolved with right pigtail catheter in place. The wall remains thickened at 7 mm. There is associated right lower lobe infiltrate with air bronchograms. Small right hydropneumothorax. No nodule, mass, or airspace disease. CXR reviewed: small chronic right pleural effusion, otherwise clear. Subjective     Cc: hypotension    29 yo with PMHx of TBI presenting with fevers/seizures. Per pts father pt started having fevers over the weekend. Increasingly more lethargic and overnight had long seizures lasting about 10 min. Pt has had ativan doses to break seizures at this point, but father reports him being lethargic before this. Had some vomiting leading up to this as well. No diarrhea. Pt has issues with chronic constipation. Had a caretaker that was sick 2 weeks ago, otherwise no sick contacts. Last hospitalized  with similar sxs. All cultures neg at that time. Interval history  Afebrile  Sats 97% on RA  BP stable  WBC 4.6  Hgb 7.3  Plt 77  Chest tube with 5ml documented out overnight. ROS: Unable to obtain due to patient condition.   No family at bedside. Past Medical History:   Diagnosis Date    Anemia 7/28/2019    History of vascular access device 01/29/2020    5F DOUBLE LUMEN PICC PLACEC BY ROSA MCGARRY RN R BRACHIAL 38 CM, NO DIFFICULTY    Neurological disorder     traumatic brain injury    Seizures ([facility] [address]     TBI (traumatic brain injury) ([facility] [address]       Past Surgical History:   Procedure Laterality Date    HX CRANIOTOMY        Prior to Admission medications    Medication Sig Start Date End Date Taking? Authorizing Provider   traZODone (DESYREL) 100 mg tablet Take 100 mg by mouth nightly. Yes Provider, Historical   lacosamide (VIMPAT) 200 mg tab tablet Take 1 Tab by mouth two (2) times a day. Max Daily Amount: 400 mg. 12/26/19  Yes Enma Pal MD   diazePAM (VALIUM) 5 mg tablet Take 0.5 Tabs by mouth every eight (8) hours as needed (for excssive spasticity or seizure). Max Daily Amount: 7.5 mg. 12/26/19  Yes Enma Pal MD   aspirin-acetaminophen-caffeine [facility]) 116-184-36 mg per tablet Take 2 Tabs by mouth every six (6) hours as needed for Pain or Headache. Do not use with ibuprofen   Yes Provider, Historical   cholecalciferol, vitamin D3, (VITAMIN D3) 2,000 unit tab Take 2,000 Units by mouth daily. Yes Provider, Historical   omega 3-DHA-EPA-fish oil 1,000 mg (120 mg-180 mg) capsule Take 1 Cap by mouth every evening. Yes Provider, Historical   Lactobacillus acidophilus (ACIDOPHILUS) cap Take 1 Cap by mouth daily. Before morning turn   Yes Provider, Historical   bromocriptine mesylate (BROMOCRIPTINE PO) Take 5 mg by mouth four (4) times daily. Patient takes at 8am, 2pm, 6pm, 8pm   Yes Provider, Historical   dantrolene (DANTRIUM) 100 mg capsule Take 100 mg by mouth four (4) times daily. Patient takes at 8am, 2pm, 6pm, 8pm   Yes Provider, Historical   senna (SENNA) 8.6 mg tablet Take 2 Tabs by mouth nightly. Yes Provider, Historical   pantoprazole (PROTONIX) 40 mg tablet Take 40 mg by mouth daily.  Before morning turn Yes Provider, Historical   ibuprofen (MOTRIN) 600 mg tablet Take 600 mg by mouth every four (4) hours as needed for Pain. Yes Provider, Historical   raNITIdine (ZANTAC) 150 mg tablet Take 150 mg by mouth two (2) times daily as needed for Indigestion. Yes Provider, Historical   bisacodyl (DULCOLAX) 10 mg suppository Insert 10 mg into rectum daily as needed (constipation). 7/31/19  Yes Anjana Perez MD   docusate sodium 100 mg tab Take 100 mg by mouth nightly. 7/31/19  Yes Anjana Perez MD     Current Facility-Administered Medications   Medication Dose Route Frequency    pantoprazole (PROTONIX) 40 mg in 0.9% sodium chloride 10 mL injection  40 mg IntraVENous DAILY    cholecalciferol (VITAMIN D3) tablet 5,000 Units  5,000 Units Oral DAILY    vancomycin (VANCOCIN) 1,250 mg in 0.9% sodium chloride (MBP/ADV) 250 mL  1,250 mg IntraVENous Q6H    metroNIDAZOLE (FLAGYL) IVPB premix 500 mg  500 mg IntraVENous Q12H    senna (SENOKOT) tablet 8.6 mg  1 Tab Oral DAILY    docusate sodium (COLACE) capsule 100 mg  100 mg Oral BID    sodium chloride (NS) flush 5-40 mL  5-40 mL IntraVENous Q8H    0.9% sodium chloride infusion  75 mL/hr IntraVENous CONTINUOUS    bromocriptine (PARLODEL) tablet 5 mg  5 mg Oral QID    dantrolene (DANTRIUM) capsule 100 mg  100 mg Oral QID    lacosamide (VIMPAT) tablet 200 mg  200 mg Oral BID    traZODone (DESYREL) tablet 100 mg  100 mg Oral QHS    cefepime (MAXIPIME) 2 g in 0.9% sodium chloride (MBP/ADV) 100 mL  2 g IntraVENous Q8H     No Known Allergies   Social History     Tobacco Use    Smoking status: Never Smoker    Smokeless tobacco: Never Used   Substance Use Topics    Alcohol use: No      Family History   Problem Relation Age of Onset    No Known Problems Other         reviewed. Patient does not know          Laboratory: I have personally reviewed the flowsheet and labs.      Recent Labs     02/04/20  0456 02/03/20  0335 02/02/20  0426   WBC 4.6 3.7* 4.2   HGB 7.3* 7.1* 7.6*   HCT 25.5* 25.3* 26.8*   PLT 77* UNABLE TO REPORT ACCURATE COUNT DUE TO PLATELET AGGREGATION, HOWEVER, PLATELETS APPEAR DECREASED IN NUMBER ON SMEAR.   PLEASE RESUBMIT SODIUM CITRATE (BLUE) AND EDTA (LAVENDAR) TUBES FOR HEMATOLOGICAL TESTING. 117*     Recent Labs     02/04/20  0456 02/03/20  0335 02/02/20  0426    140 137   K 4.1 4.2 4.1    106 105   CO2 26 30 26   GLU 97 99 101*   BUN 16 18 15   CREA 0.45* 0.44* 0.40*   CA 8.0* 7.9* 8.0*   PHOS  --  3.4 3.0   ALB  --  2.3* 2.4*       Objective:     Visit Vitals  /66 (BP 1 Location: Left leg, BP Patient Position: At rest)   Pulse 93   Temp 97.7 °F (36.5 °C)   Resp 16   Ht 6' 2.02\" (1.88 m)   Wt 62.2 kg (137 lb 2 oz)   SpO2 97%   BMI 17.60 kg/m²         Intake/Output Summary (Last 24 hours) at 2/4/2020 1002  Last data filed at 2/4/2020 5522  Gross per 24 hour   Intake 901.25 ml   Output 2380 ml   Net -1478.75 ml     EXAM:   GENERAL: awake, interactive, chronic contractures,  HEENT:  anicteric, EOMI, no alar flaring or epistaxis, oral mucosa moist without cyanosis, NECK:  no jugular vein distention, no retractions, no thyromegaly or masses, LUNGS: anterior exam CTA, no w/r/r, HEART:  Regular rate and rhythm with no MGR; no edema is present, ABDOMEN:  soft with no tenderness, bowel sounds present, EXTREMITIES:  warm with no cyanosis, SKIN:  no jaundice or ecchymosis and NEUROLOGIC:  Moving upper extremeties,    TIFFANIE Francisco  [facility] [place]

## 2020-02-04 NOTE — PROGRESS NOTES
Problem: Dysphagia (Adult)  Goal: *Acute Goals and Plan of Care (Insert Text)  Description  Swallowing goals initiated 1-30-20:  1)  Tolerate dysphagia 1, nectars without s/s aspiration by 2-6-20   Outcome: Progressing Towards Goal   SPEECH LANGUAGE PATHOLOGY DYSPHAGIA TREATMENT: WEEKLY REASSESSMENT  Patient: Chintan Noyola (3 y.o. male)  Date: 2/4/2020  Diagnosis: GI bleed [K92.2]   Pneumonia       Precautions: aspiration      ASSESSMENT:  Patient with inconsistent tolerance of PO diet. He has aspiration risks due to 1) feeder status 2) dependence for oral care 3) oral-pharyngeal dysphagia  4) weak cough,  5) sedentary status with kyphosis providing additional post prandial risk. MD has written order for MBS. Will attempt, but likely results will be complicated , as his head turn, tilt and kyphosis will most likely prevent visualization of the pharynx. MBS will be attempted to provide information/education for family about aspiration risks and help with goals of care. Patient's progression toward goals since last assessment: inconsistent tolerance of diet. SLP has seen this patient 14x since last July 2019 in several admissions. PLAN:  Goals have been updated based on progression since last assessment. Patient continues to benefit from skilled intervention to address the above impairments. Continue to follow the patient 2 times a week to address goals. Recommendations and Planned Interventions:  MBS today at 1pm  Discharge Recommendations: To Be Determined     SUBJECTIVE:   Patient loves to eat-very interested. OBJECTIVE:   Cognitive and Communication Status:  Neurologic State: Alert  Orientation Level: Unable to verbalize  Cognition: Follows commands           Dysphagia Treatment:  Oral Assessment:     P.O.  Trials:  Patient Position: upright, but ,kyphotic  and head turned to L  Vocal quality prior to P.O.: Aphonic  Consistency Presented: Puree;Nectar thick liquid  How Presented: SLP-fed/presented;Spoon;Straw;Successive swallows   ORAL PHASE:   Bolus Acceptance: No impairment  Bolus Formation/Control: Impaired(L oral residue due to lean. completed oral care after PO. moderate L oral leakage)     Propulsion: Delayed (# of seconds)  Oral Residue: 10-50% of bolus  PHARYNGEAL PHASE:   Initiation of Swallow: Delayed (# of seconds)(intermittently)  Laryngeal Elevation: Weak(mild)  Aspiration Signs/Symptoms: (delayed wet cough, weak, nonproductive)  Pharyngeal Phase Characteristics: Altered vocal quality; Suspected pharyngeal residue      Suspected aspiration for a variety of reasons. Exercises:  Laryngeal Exercises:                                                                                                                                   Pain:  Pain Scale 1: Adult Nonverbal Pain Scale  Pain Intensity 1: 0       After treatment patient left in no apparent distress:   Patient left in no apparent distress in bed, Nursing notified, and no family present. COMMUNICATION/EDUCATION:   Patient was educated regarding his deficit(s) of dysphagia  as this relates to his diagnosis of PNA. He demonstrated Guarded understanding as evidenced by severe TBI=nonverbal.    The patients plan of care including recommendations, planned interventions, and recommended diet changes were discussed with: Registered Nurse and Physician.     CATHLEEN Isaac  Time Calculation: 15 mins

## 2020-02-04 NOTE — PROGRESS NOTES
SPEECH PATHOLOGY MODIFIED BARIUM SWALLOW STUDY  Patient: Luis Lozano (9 y.o. male)  Date: 2/4/2020  Primary Diagnosis: GI bleed [K92.2]       Precautions: aspiration       ASSESSMENT :  Based on the objective data described below, the patient presents with trace silent aspiration of nectar thick liquids. Aspiration occurred due to mild delay in swallow. He also had mild oral residue, with risk to aspirated after the swallow. .  perisistent chronic post prandial aspiration risks from sedentary status and positioning  Honey thick liquids may lead to difficulty with hydration. Patient will benefit from skilled intervention to address the above impairments. Patients rehabilitation potential is considered to be Fair     PLAN :  Recommendations and Planned Interventions:  Downgrade diet to dysphagia 1, HONEY THICK liquids. Frequency/Duration: Patient will be followed by speech-language pathology 2 times a week to address goals. Discharge Recommendations: To Be Determined     SUBJECTIVE:   Patient cooperative with PO during evaluation  .     OBJECTIVE:     Past Medical History:   Diagnosis Date    Anemia 7/28/2019    History of vascular access device 01/29/2020    5F DOUBLE LUMEN PICC PLACEC BY ROSA MCGARRY RN R BRACHIAL 38 CM, NO DIFFICULTY    Neurological disorder     traumatic brain injury    Seizures (HCC)     TBI (traumatic brain injury) ([facility] [address]      Past Surgical History:   Procedure Laterality Date    HX CRANIOTOMY       Prior Level of Function/Home Situation:   Home Situation  Home Environment: Private residence  One/Two Story Residence: Other (Comment)  # of Interior Steps: 0  Lift Chair Available: Yes  Living Alone: No  Support Systems: Family member(s), Home care staff  Patient Expects to be Discharged to[de-identified] Private residence  Current DME Used/Available at Home: Adaptive bathing aides, Adaptive dressing aides, Adaptive feeding aides, Blood pressure cuff, Commode, bedside, Lift chair, Hospital bed, Raised toilet seat, Safety frame toliet, Shower chair, Wheelchair, Wheelchair, power  Diet prior to admission: dysphagia1, nectasr  Current Diet:  Dysphagia 1, nectars   Radiologist: [place] Views: Lateral  Patient Position: upright in chair but head tilteed to L and L lean    Trial 1: Trial 2:   Consistency Presented: Nectar thick liquid;Honey thick liquid;Puree     How Presented: Self-fed/presented;Spoon;Straw;Successive swallows      ORAL PHASE:      Bolus Acceptance: No impairment     Bolus Formation/Control: Impaired(especially for first few swallows and delay): Anterior;Posterior  :     Propulsion: Tongue pumping     Oral Residue: (all throughout oral cavity)   PHARYNGEAL PHASE:      Initiation of Swallow: Triggered at vallecula     Timing: Pooling 1-5 sec     Penetration: Trace(to small amounts with nectars due to mild delay in swallow)     Aspiration/Timing: Silent ;Trace(trace amounts)     Pharyngeal Clearance: Vallecular residue; Less than 10%                               Trial 3: Trial 4:                            :    :                                                                        Decreased Tongue Base Retraction?: Yes  Laryngeal Elevation: Reduced excursion with laryngeal vestibule gap; Inadequate epiglottic inversion(mild)  Aspiration/Penetration Score: 8 (Aspiration-Contrast passes cords/glottis with no effort to eject, ie/silent aspiration)  Pharyngeal Symmetry: Not assessed(assessment limited by patient 's head position)                NOMS:   The NOMS functional outcome measure was used to quantify this patient's level of swallowing impairment.   Based on the NOMS, the patient was determined to be at level 4 for swallow function         NOMS Swallowing Levels:  Level 1 (CN): NPO  Level 2 (CM): NPO but takes consistency in therapy  Level 3 (CL): Takes less than 50% of nutrition p.o. and continues with nonoral feedings; and/or safe with mod cues; and/or max diet restriction  Level 4 (CK): Safe swallow but needs mod cues; and/or mod diet restriction; and/or still requires some nonoral feeding/supplements  Level 5 (CJ): Safe swallow with min diet restriction; and/or needs min cues  Level 6 (CI): Independent with p.o.; rare cues; usually self cues; may need to avoid some foods or needs extra time  Level 7 ([address] [place] [address]): Independent for all p.o.  CARINA. (2003). National Outcomes Measurement System (NOMS): Adult Speech-Language Pathology User's Guide. COMMUNICATION/EDUCATION:   Patient was educated regarding His deficit(s) of DYSPHAGIA  as this relates to His diagnosis of H/O SEVERE TBI, PNA. He demonstrated Guarded understanding as evidenced by nonverbal status. .    The patients plan of care including findings from MBS, recommendations, planned interventions, and recommended diet changes were discussed with: none yet. Patient is unable to participate in goal setting and plan of care.     Thank you for this referral.  Stephani Pagan, SLP  Time Calculation: 30 mins

## 2020-02-04 NOTE — PROGRESS NOTES
Bedside and Verbal shift change report given to Simón Johnson RN (oncoming nurse) by Sergey Medina RN (offgoing nurse). Report included the following information Kardex.

## 2020-02-04 NOTE — PROGRESS NOTES
1600- Noticed after patient came back from CT that chest tube drainage chamber was not at at the amount of 160 mL where it was before, it was at 40 mL. Per Charge Nurse did not need to change out chamber just start where it was. 1930- Bedside shift change report given to Hernán Zamarripa (oncoming nurse) by Mario Alberto Patel (offgoing nurse). Report included the following information SBAR, Kardex, ED Summary, Intake/Output, MAR and Recent Results.

## 2020-02-04 NOTE — PROGRESS NOTES
0700-Bedside and Verbal shift change report given to [address] [place] [address] (oncoming nurse) by Salome Arias (offgoing nurse). Report included the following information SBAR, Kardex, Intake/Output, MAR, Accordion, Recent Results, Med Rec Status, Cardiac Rhythm NSR. and Alarm Parameters . 1410- Pt returned from [facility] and left wrist PIV was laying in bed with pt with fluids running. Pt cleaned with PCT, new condom cath placed. 1435- Pts chest tube clamped. 1900-Bedside and Verbal shift change report given to MELVIN Rouse  (oncoming nurse) by [address] [place] [address] (offgoing nurse). Report included the following information SBAR, Kardex, Intake/Output, MAR, Accordion, Recent Results, Med Rec Status and Cardiac Rhythm NSR>.

## 2020-02-05 ENCOUNTER — APPOINTMENT (OUTPATIENT)
Dept: GENERAL RADIOLOGY | Age: 35
DRG: 871 | End: 2020-02-05
Payer: MEDICARE

## 2020-02-05 PROCEDURE — C9113 INJ PANTOPRAZOLE SODIUM, VIA: HCPCS | Performed by: NURSE PRACTITIONER

## 2020-02-05 PROCEDURE — 74011250636 HC RX REV CODE- 250/636: Performed by: INTERNAL MEDICINE

## 2020-02-05 PROCEDURE — 71045 X-RAY EXAM CHEST 1 VIEW: CPT

## 2020-02-05 PROCEDURE — 74011000258 HC RX REV CODE- 258: Performed by: HOSPITALIST

## 2020-02-05 PROCEDURE — 74011250636 HC RX REV CODE- 250/636: Performed by: HOSPITALIST

## 2020-02-05 PROCEDURE — 74011250637 HC RX REV CODE- 250/637: Performed by: INTERNAL MEDICINE

## 2020-02-05 PROCEDURE — 94760 N-INVAS EAR/PLS OXIMETRY 1: CPT

## 2020-02-05 PROCEDURE — 74011250636 HC RX REV CODE- 250/636: Performed by: NURSE PRACTITIONER

## 2020-02-05 PROCEDURE — 65270000029 HC RM PRIVATE

## 2020-02-05 RX ADMIN — DANTROLENE SODIUM 100 MG: 25 CAPSULE ORAL at 09:03

## 2020-02-05 RX ADMIN — AMPICILLIN SODIUM AND SULBACTAM SODIUM 3 G: 2; 1 INJECTION, POWDER, FOR SOLUTION INTRAMUSCULAR; INTRAVENOUS at 13:08

## 2020-02-05 RX ADMIN — DIAZEPAM 2.5 MG: 5 TABLET ORAL at 21:05

## 2020-02-05 RX ADMIN — ONDANSETRON 4 MG: 2 INJECTION INTRAMUSCULAR; INTRAVENOUS at 13:08

## 2020-02-05 RX ADMIN — LACOSAMIDE 200 MG: 100 TABLET, FILM COATED ORAL at 09:03

## 2020-02-05 RX ADMIN — DANTROLENE SODIUM 100 MG: 25 CAPSULE ORAL at 13:08

## 2020-02-05 RX ADMIN — TRAZODONE HYDROCHLORIDE 100 MG: 100 TABLET ORAL at 21:05

## 2020-02-05 RX ADMIN — DOCUSATE SODIUM 100 MG: 100 CAPSULE, LIQUID FILLED ORAL at 09:03

## 2020-02-05 RX ADMIN — Medication 10 ML: at 21:06

## 2020-02-05 RX ADMIN — BROMOCRIPTINE MESYLATE 5 MG: 2.5 TABLET ORAL at 17:28

## 2020-02-05 RX ADMIN — BROMOCRIPTINE MESYLATE 5 MG: 2.5 TABLET ORAL at 13:08

## 2020-02-05 RX ADMIN — DOCUSATE SODIUM 100 MG: 100 CAPSULE, LIQUID FILLED ORAL at 17:28

## 2020-02-05 RX ADMIN — AMPICILLIN SODIUM AND SULBACTAM SODIUM 3 G: 2; 1 INJECTION, POWDER, FOR SOLUTION INTRAMUSCULAR; INTRAVENOUS at 02:12

## 2020-02-05 RX ADMIN — SENNOSIDES 8.6 MG: 8.6 TABLET, FILM COATED ORAL at 09:03

## 2020-02-05 RX ADMIN — AMPICILLIN SODIUM AND SULBACTAM SODIUM 3 G: 2; 1 INJECTION, POWDER, FOR SOLUTION INTRAMUSCULAR; INTRAVENOUS at 18:23

## 2020-02-05 RX ADMIN — AMPICILLIN SODIUM AND SULBACTAM SODIUM 3 G: 2; 1 INJECTION, POWDER, FOR SOLUTION INTRAMUSCULAR; INTRAVENOUS at 06:39

## 2020-02-05 RX ADMIN — BROMOCRIPTINE MESYLATE 5 MG: 2.5 TABLET ORAL at 21:05

## 2020-02-05 RX ADMIN — Medication 20 ML: at 14:00

## 2020-02-05 RX ADMIN — CHOLECALCIFEROL TAB 125 MCG (5000 UNIT) 5000 UNITS: 125 TAB at 09:03

## 2020-02-05 RX ADMIN — SODIUM CHLORIDE 40 MG: 9 INJECTION INTRAMUSCULAR; INTRAVENOUS; SUBCUTANEOUS at 09:04

## 2020-02-05 RX ADMIN — BROMOCRIPTINE MESYLATE 5 MG: 2.5 TABLET ORAL at 09:03

## 2020-02-05 RX ADMIN — DANTROLENE SODIUM 100 MG: 25 CAPSULE ORAL at 19:09

## 2020-02-05 RX ADMIN — Medication 10 ML: at 06:38

## 2020-02-05 RX ADMIN — LACOSAMIDE 200 MG: 100 TABLET, FILM COATED ORAL at 17:28

## 2020-02-05 NOTE — PROGRESS NOTES
Bedside shift change report given to April MELVIN (oncoming nurse) by [address] (offgoing nurse). Report included the following information SBAR, Kardex, Procedure Summary, Intake/Output and MAR.

## 2020-02-05 NOTE — PROGRESS NOTES
TRANSFER - OUT REPORT:    Report given to Celestine Encinas CM on Wild Null being transferred to [facility] [phone]. Report consisted of patients Situation, Background, Assessment and   Recommendations(SBAR).      Adrienne Raymundo, RN, MSN/Care manager

## 2020-02-05 NOTE — PROGRESS NOTES
[facility]     Name: Wild Hernandes MRN: [id number]   : 1985 Hospital: [address]   Date: 2020        Impression Plan   1. Septic shock, off pressors   2. Fevers  3. Active seizures on admission with a hx of seizure disorder  4. Traumatic brain injury  5. Small right loculated effusion s/p pigtail . Fluid analysis not c/w empyema  6. Dysphagia               · Supplemental O2 as needed to keep sats > 88%. Currently on RA. · Chest tube pulled and occlusive dressing applied. Will repeat CXR to again eval small ptx. · Diet downgraded to dysphagia 1 and honey think liquids per Speech. · Abx deescalated to Unasyn per primary team - to continue through  . · Follow cultures - ngtd  · Protonix  · SCDs               Radiology  (personally reviewed) Chest CT reviewed: Right-sided subpleural fluid nearly resolved with right pigtail catheter in place. The wall remains thickened at 7 mm. There is associated right lower lobe infiltrate with air bronchograms. Small right hydropneumothorax. No nodule, mass, or airspace disease.  CXR reviewed (s/p clamp): There is a small rounded loculated air at the right lung base consistent with small pneumothorax. This has decreased in the interval.       Subjective     Cc: hypotension    31 yo with PMHx of TBI presenting with fevers/seizures. Per pts father pt started having fevers over the weekend. Increasingly more lethargic and overnight had long seizures lasting about 10 min. Pt has had ativan doses to break seizures at this point, but father reports him being lethargic before this. Had some vomiting leading up to this as well. No diarrhea. Pt has issues with chronic constipation. Had a caretaker that was sick 2 weeks ago, otherwise no sick contacts. Last hospitalized  with similar sxs. All cultures neg at that time.      Interval history  Afebrile  Sats 97% on RA  BP stable  WBC 4.6  Hgb 7.3  Plt 77  MBS: Mild penetration and minimal occasional aspiration with nectar. Minimal/mild residue in the vallecula. ROS: Unable to obtain due to patient condition. Caregiver at the bedside said he did well this morning with his breakfast/modified diet. Past Medical History:   Diagnosis Date    Anemia 7/28/2019    History of vascular access device 01/29/2020    5F DOUBLE LUMEN PICC PLACEC BY ROSA MCGARRY RN R BRACHIAL 38 CM, NO DIFFICULTY    Neurological disorder     traumatic brain injury    Seizures ([facility] [address]     TBI (traumatic brain injury) ([facility] [address]       Past Surgical History:   Procedure Laterality Date    HX CRANIOTOMY        Prior to Admission medications    Medication Sig Start Date End Date Taking? Authorizing Provider   traZODone (DESYREL) 100 mg tablet Take 100 mg by mouth nightly. Yes Provider, Historical   lacosamide (VIMPAT) 200 mg tab tablet Take 1 Tab by mouth two (2) times a day. Max Daily Amount: 400 mg. 12/26/19  Yes Eliane Todd MD   diazePAM (VALIUM) 5 mg tablet Take 0.5 Tabs by mouth every eight (8) hours as needed (for excssive spasticity or seizure). Max Daily Amount: 7.5 mg. 12/26/19  Yes Eliane Todd MD   aspirin-acetaminophen-caffeine [facility]) [phone] mg per tablet Take 2 Tabs by mouth every six (6) hours as needed for Pain or Headache. Do not use with ibuprofen   Yes Provider, Historical   cholecalciferol, vitamin D3, (VITAMIN D3) 2,000 unit tab Take 2,000 Units by mouth daily. Yes Provider, Historical   omega 3-DHA-EPA-fish oil 1,000 mg (120 mg-180 mg) capsule Take 1 Cap by mouth every evening. Yes Provider, Historical   Lactobacillus acidophilus (ACIDOPHILUS) cap Take 1 Cap by mouth daily. Before morning turn   Yes Provider, Historical   bromocriptine mesylate (BROMOCRIPTINE PO) Take 5 mg by mouth four (4) times daily. Patient takes at 8am, 2pm, 6pm, 8pm   Yes Provider, Historical   dantrolene (DANTRIUM) 100 mg capsule Take 100 mg by mouth four (4) times daily.  Patient takes at 8am, 2pm, 6pm, 8pm   Yes Provider, Historical   senna (SENNA) 8.6 mg tablet Take 2 Tabs by mouth nightly. Yes Provider, Historical   pantoprazole (PROTONIX) 40 mg tablet Take 40 mg by mouth daily. Before morning turn   Yes Provider, Historical   ibuprofen (MOTRIN) 600 mg tablet Take 600 mg by mouth every four (4) hours as needed for Pain. Yes Provider, Historical   raNITIdine (ZANTAC) 150 mg tablet Take 150 mg by mouth two (2) times daily as needed for Indigestion. Yes Provider, Historical   bisacodyl (DULCOLAX) 10 mg suppository Insert 10 mg into rectum daily as needed (constipation). 7/31/19  Yes Minh Sullivan MD   docusate sodium 100 mg tab Take 100 mg by mouth nightly. 7/31/19  Yes Minh Sullivan MD     Current Facility-Administered Medications   Medication Dose Route Frequency    ampicillin-sulbactam (UNASYN) 3 g in 0.9% sodium chloride (MBP/ADV) 100 mL  3 g IntraVENous Q6H    pantoprazole (PROTONIX) 40 mg in 0.9% sodium chloride 10 mL injection  40 mg IntraVENous DAILY    cholecalciferol (VITAMIN D3) tablet 5,000 Units  5,000 Units Oral DAILY    senna (SENOKOT) tablet 8.6 mg  1 Tab Oral DAILY    docusate sodium (COLACE) capsule 100 mg  100 mg Oral BID    sodium chloride (NS) flush 5-40 mL  5-40 mL IntraVENous Q8H    0.9% sodium chloride infusion  75 mL/hr IntraVENous CONTINUOUS    bromocriptine (PARLODEL) tablet 5 mg  5 mg Oral QID    dantrolene (DANTRIUM) capsule 100 mg  100 mg Oral QID    lacosamide (VIMPAT) tablet 200 mg  200 mg Oral BID    traZODone (DESYREL) tablet 100 mg  100 mg Oral QHS     No Known Allergies   Social History     Tobacco Use    Smoking status: Never Smoker    Smokeless tobacco: Never Used   Substance Use Topics    Alcohol use: No      Family History   Problem Relation Age of Onset    No Known Problems Other         reviewed. Patient does not know          Laboratory: I have personally reviewed the flowsheet and labs.      Recent Labs     02/04/20  5696 02/03/20  0335   WBC 4.6 3.7*   HGB 7.3* 7.1*   HCT 25.5* 25.3*   PLT 77* UNABLE TO REPORT ACCURATE COUNT DUE TO PLATELET AGGREGATION, HOWEVER, PLATELETS APPEAR DECREASED IN NUMBER ON SMEAR. PLEASE RESUBMIT SODIUM CITRATE (BLUE) AND EDTA (LAVENDAR) TUBES FOR HEMATOLOGICAL TESTING.      Recent Labs     02/04/20  0456 02/03/20  0335    140   K 4.1 4.2    106   CO2 26 30   GLU 97 99   BUN 16 18   CREA 0.45* 0.44*   CA 8.0* 7.9*   PHOS  --  3.4   ALB  --  2.3*       Objective:     Visit Vitals  /68 (BP 1 Location: Left arm, BP Patient Position: At rest)   Pulse 78   Temp 97.8 °F (36.6 °C)   Resp 16   Ht 6' 2.02\" (1.88 m)   Wt 62.4 kg (137 lb 9.1 oz)   SpO2 97%   BMI 17.65 kg/m²         Intake/Output Summary (Last 24 hours) at 2/5/2020 0957  Last data filed at 2/5/2020 0932  Gross per 24 hour   Intake 1790 ml   Output 1450 ml   Net 340 ml     EXAM:   GENERAL: awake, interactive, chronic contractures,  HEENT:  anicteric, EOMI, no alar flaring or epistaxis, oral mucosa moist without cyanosis, NECK:  no jugular vein distention, no retractions, no thyromegaly or masses, LUNGS: anterior exam CTA, no w/r/r, HEART:  Regular rate and rhythm with no MGR; no edema is present, ABDOMEN:  soft with no tenderness, bowel sounds present, EXTREMITIES:  warm with no cyanosis, SKIN:  no jaundice or ecchymosis and NEUROLOGIC:  Moving upper extremeties,    Fleet Bars, PA  [facility]

## 2020-02-05 NOTE — PROGRESS NOTES
Elfego Jansen [facility] [place] [address]  [address] [facility], [place], [id number] [place] [address]  [phone]      Medical Progress Note      NAME: Ishan Wetzel   :  1985  MRM:  [id number]    Date/Time: 2020  8:38 AM          Subjective:     Chief Complaint:  Non-verbal: no family at bedside  Awake  No overnight issues  Diet changed slightly as MBS did show signs of silent aspiration  Rt chest tube to be removed today     ROS:    Unable to obtain          Objective:       Vitals:          Last 24hrs VS reviewed since prior progress note.  Most recent are:    Visit Vitals  /69 (BP 1 Location: Left arm, BP Patient Position: At rest)   Pulse 92   Temp 97.6 °F (36.4 °C)   Resp 16   Ht 6' 2.02\" (1.88 m)   Wt 62.4 kg (137 lb 9.1 oz)   SpO2 99%   BMI 17.65 kg/m²     SpO2 Readings from Last 6 Encounters:   20 99%   19 97%   19 90%   19 92%   19 100%   18 99%            Intake/Output Summary (Last 24 hours) at 2020 1225  Last data filed at 2020 0932  Gross per 24 hour   Intake 960 ml   Output 1450 ml   Net -490 ml          Exam:     Physical Exam:    Gen:    Well-developed, thin, frail, chronically ill-appearing, in no acute distress  HEENT:  Pink conjunctivae,   Neck:  Supple, without masses, thyroid non-tender  Resp:  diminished bs with chest tube inserted in right chest  Card:   No murmurs, normal S1, S2 without thrills, bruits or peripheral edema  Abd:  Soft, non-tender, non-distended, normoactive bowel sounds are present,   Musc:  No cyanosis or clubbing  Skin:   No rashes or ulcers, skin turgor is goodd  Neuro:  Non-verbal and contracted, follows very simple commands   Psych:  No insight, not oriented to person, place and time, alert       Telemetry reviewed:   normal sinus rhythm    Medications Reviewed: (see below)    Lab Data Reviewed: (see below)    ______________________________________________________________________    Medications:     Current Facility-Administered Medications   Medication Dose Route Frequency    ampicillin-sulbactam (UNASYN) 3 g in 0.9% sodium chloride (MBP/ADV) 100 mL  3 g IntraVENous Q6H    pantoprazole (PROTONIX) 40 mg in 0.9% sodium chloride 10 mL injection  40 mg IntraVENous DAILY    ondansetron (ZOFRAN) injection 4 mg  4 mg IntraVENous Q6H PRN    cholecalciferol (VITAMIN D3) tablet 5,000 Units  5,000 Units Oral DAILY    senna (SENOKOT) tablet 8.6 mg  1 Tab Oral DAILY    docusate sodium (COLACE) capsule 100 mg  100 mg Oral BID    bisacodyL (DULCOLAX) suppository 10 mg  10 mg Rectal DAILY PRN    sodium chloride (NS) flush 5-40 mL  5-40 mL IntraVENous Q8H    sodium chloride (NS) flush 5-40 mL  5-40 mL IntraVENous PRN    sodium chloride (NS) flush 5-10 mL  5-10 mL IntraVENous PRN    0.9% sodium chloride infusion  75 mL/hr IntraVENous CONTINUOUS    alteplase (CATHFLO) 1 mg in sterile water (preservative free) 1 mL injection  1 mg InterCATHeter PRN    bromocriptine (PARLODEL) tablet 5 mg  5 mg Oral QID    dantrolene (DANTRIUM) capsule 100 mg  100 mg Oral QID    diazePAM (VALIUM) tablet 2.5 mg  2.5 mg Oral Q8H PRN    lacosamide (VIMPAT) tablet 200 mg  200 mg Oral BID    traZODone (DESYREL) tablet 100 mg  100 mg Oral QHS            Lab Review:     Recent Labs     02/04/20  0456 02/03/20  0335   WBC 4.6 3.7*   HGB 7.3* 7.1*   HCT 25.5* 25.3*   PLT 77* UNABLE TO REPORT ACCURATE COUNT DUE TO PLATELET AGGREGATION, HOWEVER, PLATELETS APPEAR DECREASED IN NUMBER ON SMEAR. PLEASE RESUBMIT SODIUM CITRATE (BLUE) AND EDTA (LAVENDAR) TUBES FOR HEMATOLOGICAL TESTING.      Recent Labs     02/04/20  0456 02/03/20  0335    140   K 4.1 4.2    106   CO2 26 30   GLU 97 99   BUN 16 18   CREA 0.45* 0.44*   CA 8.0* 7.9*   PHOS  --  3.4   ALB  --  2.3*     Lab Results   Component Value Date/Time    Glucose (POC) 103 (H) 12/23/2019 05:41 PM    Glucose (POC) 96 08/19/2019 08:49 AM    Glucose (POC) 92 07/29/2019 06:17 AM    Glucose (POC) 70 07/29/2019 03:09 AM    Glucose (POC) 83 07/23/2019 03:47 PM     No results for input(s): PH, PCO2, PO2, HCO3, FIO2 in the last 72 hours. No results for input(s): INR, INREXT, INREXT in the last 72 hours. No results found for: SDES  Lab Results   Component Value Date/Time    Culture result: NO GROWTH 4 DAYS 01/31/2020 05:05 PM    Culture result: NO FUNGUS ISOLATED 3 DAYS 01/31/2020 05:05 PM    Culture result: MRSA PRESENT (A) 01/29/2020 03:13 PM    Culture result:  01/29/2020 03:13 PM     CALLED TO AND READ BACK BY  Luciana Way AT 2327 ON 1/30/20. PJ      Culture result:  01/29/2020 03:13 PM         Screening of patient nares for MRSA is for surveillance purposes and, if positive, to facilitate isolation considerations in high risk settings. It is not intended for automatic decolonization interventions per se as regimens are not sufficiently effective to warrant routine use. Assessment:     Principal Problem:    Pneumonia (1/31/2020)    Active Problems:    TBI (traumatic brain injury) ([facility] [address] (7/25/2019)      Anemia (7/28/2019)      Seizure ([facility] [address] (8/19/2019)      GI bleed (1/29/2020)      Septic shock ([facility] [address] (1/31/2020)           Plan:     Principal Problem:    Pneumonia, possibly aspiration    - no empyema, Fluid cx negative    - s/p IR guided chest tube placement. Draining well   - Pleural fluid cultures: no growth so fare              - appreciate pulmonary input   - Repeat CT chest with nearly resolved Effusion, d/w Pulmonary, plan to remove pigtail  today    - de-escalate antibiotics at this time to Unasyn to complete course 10 days total    - Speech therapy input appreciated, s/p MBS on 2/4/2020 with silent aspiration, diet slightly modified to pureed 1 with Honey Thick. GI bleed   - Hgb low but stable, GI input appreciated              - check iron profile and transfuse once HB<7   - GI signed off at this time      SIRS    - pneumonia    - BP is stable.  Patient is off levophed and continue IVF to 75 ml/hr      TBI    - at baseline from my prior experience with him   - Palliative Care input appreciated, to d/w mother who apparently has guardianship    -remains full code.       Anemia    - Hgb low but stable, monitor and transfused when HB<7      Seizure    - loaded with Vimpat on admit, continue home dosing   - seizure likely due to acute illness as above      Hypocalcemia /hypophosphetemia likely d/t vitamin D def:     -continue on Vitamin D 5000 unit daily    Total time spent in patient care: 30 minutes                  Care Plan discussed with: Patient,  Nursing Staff     Discussed:  Code Status, Care Plan     Prophylaxis:  SCDs    Disposition:   PT, OT, RN Home in AM if stable            ___________________________________________________    Attending Physician: Mariia Oliveros MD

## 2020-02-05 NOTE — PROGRESS NOTES
TRANSFER - OUT REPORT:    Verbal report given to Franchesca(name) on Danika Gordon  being transferred to [facility](unit) for routine progression of care       Report consisted of patients Situation, Background, Assessment and   Recommendations(SBAR). Information from the following report(s) SBAR was reviewed with the receiving nurse. Lines:   Peripheral IV 02/04/20 Left Hand (Active)   Site Assessment Clean, dry, & intact 2/4/2020  8:00 PM   Phlebitis Assessment 0 2/4/2020  8:00 PM   Infiltration Assessment 0 2/4/2020  4:06 PM   Dressing Status Clean, dry, & intact 2/4/2020  8:00 PM   Dressing Type Transparent 2/4/2020  4:06 PM   Hub Color/Line Status Pink 2/4/2020  8:00 PM   Action Taken Open ports on tubing capped 2/4/2020  4:06 PM   Alcohol Cap Used Yes 2/4/2020  8:00 PM        Opportunity for questions and clarification was provided.       Patient transported with:   Tradyo

## 2020-02-06 ENCOUNTER — APPOINTMENT (OUTPATIENT)
Dept: GENERAL RADIOLOGY | Age: 35
DRG: 871 | End: 2020-02-06
Payer: MEDICARE

## 2020-02-06 VITALS
DIASTOLIC BLOOD PRESSURE: 79 MMHG | TEMPERATURE: 97.6 F | BODY MASS INDEX: 17.63 KG/M2 | HEIGHT: 74 IN | RESPIRATION RATE: 18 BRPM | WEIGHT: 137.35 LBS | HEART RATE: 93 BPM | SYSTOLIC BLOOD PRESSURE: 131 MMHG | OXYGEN SATURATION: 99 %

## 2020-02-06 PROCEDURE — 74011250636 HC RX REV CODE- 250/636: Performed by: INTERNAL MEDICINE

## 2020-02-06 PROCEDURE — 74011000258 HC RX REV CODE- 258: Performed by: HOSPITALIST

## 2020-02-06 PROCEDURE — C9113 INJ PANTOPRAZOLE SODIUM, VIA: HCPCS | Performed by: NURSE PRACTITIONER

## 2020-02-06 PROCEDURE — 74011250637 HC RX REV CODE- 250/637: Performed by: INTERNAL MEDICINE

## 2020-02-06 PROCEDURE — 74011250636 HC RX REV CODE- 250/636: Performed by: NURSE PRACTITIONER

## 2020-02-06 PROCEDURE — 74011250636 HC RX REV CODE- 250/636: Performed by: HOSPITALIST

## 2020-02-06 PROCEDURE — 71045 X-RAY EXAM CHEST 1 VIEW: CPT

## 2020-02-06 PROCEDURE — 74011250637 HC RX REV CODE- 250/637: Performed by: HOSPITALIST

## 2020-02-06 RX ORDER — AMOXICILLIN AND CLAVULANATE POTASSIUM 875; 125 MG/1; MG/1
1 TABLET, FILM COATED ORAL 2 TIMES DAILY
Qty: 6 TAB | Refills: 0 | Status: SHIPPED | OUTPATIENT
Start: 2020-02-06 | End: 2020-02-09

## 2020-02-06 RX ORDER — TRAMADOL HYDROCHLORIDE 50 MG/1
50 TABLET ORAL ONCE
Status: DISCONTINUED | OUTPATIENT
Start: 2020-02-06 | End: 2020-02-06

## 2020-02-06 RX ADMIN — DOCUSATE SODIUM 100 MG: 100 CAPSULE, LIQUID FILLED ORAL at 09:37

## 2020-02-06 RX ADMIN — BROMOCRIPTINE MESYLATE 5 MG: 2.5 TABLET ORAL at 09:37

## 2020-02-06 RX ADMIN — SENNOSIDES 8.6 MG: 8.6 TABLET, FILM COATED ORAL at 09:37

## 2020-02-06 RX ADMIN — SODIUM CHLORIDE 40 MG: 9 INJECTION INTRAMUSCULAR; INTRAVENOUS; SUBCUTANEOUS at 09:36

## 2020-02-06 RX ADMIN — SODIUM CHLORIDE 75 ML/HR: 900 INJECTION, SOLUTION INTRAVENOUS at 06:11

## 2020-02-06 RX ADMIN — DANTROLENE SODIUM 100 MG: 25 CAPSULE ORAL at 09:45

## 2020-02-06 RX ADMIN — IBUPROFEN 600 MG: 200 TABLET, FILM COATED ORAL at 09:53

## 2020-02-06 RX ADMIN — AMPICILLIN SODIUM AND SULBACTAM SODIUM 3 G: 2; 1 INJECTION, POWDER, FOR SOLUTION INTRAMUSCULAR; INTRAVENOUS at 06:04

## 2020-02-06 RX ADMIN — LACOSAMIDE 200 MG: 100 TABLET, FILM COATED ORAL at 09:37

## 2020-02-06 RX ADMIN — AMPICILLIN SODIUM AND SULBACTAM SODIUM 3 G: 2; 1 INJECTION, POWDER, FOR SOLUTION INTRAMUSCULAR; INTRAVENOUS at 01:43

## 2020-02-06 RX ADMIN — Medication 10 ML: at 06:04

## 2020-02-06 RX ADMIN — CHOLECALCIFEROL TAB 125 MCG (5000 UNIT) 5000 UNITS: 125 TAB at 09:37

## 2020-02-06 NOTE — DISCHARGE SUMMARY
Discharge Summary     Patient:  Ghada Killian       MRN: [id number]       YOB: 1985       Age: 30 y.o. Date of admission:  1/29/2020    Date of discharge:  2/6/2020    Primary care provider: Dr. Juliana Lopez MD    Admitting provider:  Jennyfer Reyna MD    Discharging provider:  Peyman Silva MD - [phone]  If unavailable, call [phone] and ask the  to page the triage hospitalist.    Consultations  · IP CONSULT TO GASTROENTEROLOGY  · IP CONSULT TO [address]  · IP CONSULT TO PULMONOLOGY    Procedures  · * No surgery found *    Discharge destination: HOME with [facility]. The patient is stable for discharge. Admission diagnosis  · GI bleed [K92.2]    Current Discharge Medication List      START taking these medications    Details   amoxicillin-clavulanate (AUGMENTIN) 875-125 mg per tablet Take 1 Tab by mouth two (2) times a day for 3 days. Qty: 6 Tab, Refills: 0         CONTINUE these medications which have NOT CHANGED    Details   traZODone (DESYREL) 100 mg tablet Take 100 mg by mouth nightly. lacosamide (VIMPAT) 200 mg tab tablet Take 1 Tab by mouth two (2) times a day. Max Daily Amount: 400 mg. Qty: 60 Tab, Refills: 0    Associated Diagnoses: Seizure (HCC)      diazePAM (VALIUM) 5 mg tablet Take 0.5 Tabs by mouth every eight (8) hours as needed (for excssive spasticity or seizure). Max Daily Amount: 7.5 mg.  Qty: 20 Tab, Refills: 0    Associated Diagnoses: Seizure ([address]      aspirin-acetaminophen-caffeine (EXCEDRIN ES) 250-250-65 mg per tablet Take 2 Tabs by mouth every six (6) hours as needed for Pain or Headache. Do not use with ibuprofen      cholecalciferol, vitamin D3, (VITAMIN D3) 2,000 unit tab Take 2,000 Units by mouth daily. omega 3-DHA-EPA-fish oil 1,000 mg (120 mg-180 mg) capsule Take 1 Cap by mouth every evening.       Lactobacillus acidophilus (ACIDOPHILUS) cap Take 1 Cap by mouth daily. Before morning turn      bromocriptine mesylate (BROMOCRIPTINE PO) Take 5 mg by mouth four (4) times daily. Patient takes at 8am, 2pm, 6pm, 8pm      dantrolene (DANTRIUM) 100 mg capsule Take 100 mg by mouth four (4) times daily. Patient takes at 8am, 2pm, 6pm, 8pm      senna (SENNA) 8.6 mg tablet Take 2 Tabs by mouth nightly. pantoprazole (PROTONIX) 40 mg tablet Take 40 mg by mouth daily. Before morning turn      ibuprofen (MOTRIN) 600 mg tablet Take 600 mg by mouth every four (4) hours as needed for Pain. raNITIdine (ZANTAC) 150 mg tablet Take 150 mg by mouth two (2) times daily as needed for Indigestion. bisacodyl (DULCOLAX) 10 mg suppository Insert 10 mg into rectum daily as needed (constipation). docusate sodium 100 mg tab Take 100 mg by mouth nightly. Follow-up Information     Follow up With Specialties Details Why Contact Info    Yojana Mancini MD Internal Medicine In 1 week Discharge follow up  [facility] [employer] [facility] [address] [phone]            Final discharge diagnoses and brief hospital course  Please also refer to the admission H&P for details on the presenting problem. 29 yo male with TBI, Anemia, Seizure admitted for GI bleeding. Per history, pt had fever following osteoporosis infusion and had not been acting right. Pt had prolonged seizure  Before admission and was hypotension despite IVF in ER and required Pressors. Severe sepsis with shock 2/2 Pneumonia, possibly aspiration, POA              - no empyema, Fluid cx negative               - s/p IR guided chest tube placement.  S/p removal on 2/5/2020              - Pleural fluid cultures: no growth so fare              - appreciate pulmonary input              - Repeat CT chest with nearly resolved Effusion, d/w Pulmonary, plan to remove pigtail  today               - de-escalate antibiotics at this time to Unasyn to complete course 10 days total - Speech therapy input appreciated, s/p [facility] [place] [facility] on 2/4/2020 with silent aspiration, diet slightly modified to pureed 1 with Honey Thick.   - repeat CXR: stable    - d/c on Augmentin to complete course       GI bleed              - Hgb low but stable, GI input appreciated              - check iron profile and transfuse once HB<7              - GI signed off at this time       SIRS               - pneumonia               - BP is stable       TBI               - at baseline from my prior experience with him              - Palliative Care input appreciated, to d/w mother who apparently has guardianship                -remains full code.       Anemia               - Hgb low but stable, monitor and transfused when HB<7       Seizure, breakthrough               - loaded with Vimpat on admit, continue home dosing              - seizure likely due to acute illness as above       Hypocalcemia /hypophosphetemia likely d/t vitamin D def:     -continue on Vitamin D 5000 unit daily    FOLLOW-UP TESTS recommended: NONE     PENDING TEST RESULTS:  At the time of your discharge the following test results are still pending: NONE  Please make sure you review these results with your outpatient follow-up provider(s).     SYMPTOMS to watch for: chest pain, shortness of breath, fever, chills, nausea, vomiting, diarrhea, change in mentation, falling, weakness, bleeding.     DIET/what to eat:  Pureed 1 Diet with Honey Thickened , Magic cup with all meal, Yogurt for snacks     ACTIVITY:  Resume previous activity      WOUND CARE:   Daily with skin care apply lotion to extremities and bony prominences for protection from friction/shear. Apply Zinc barrier to the gluteal areas daily and as needed with moisture.  Float heels and protect (with heel Boots). Turn Q2h while in bed (with turn team). Protect from lines and devices.  When up in chair use a waffle cushion and reposition every 20 minutes.     To the right inner gluteal area apply zinc barrier to the site and cover with foam border dressing. Change dressing every 2 days and if moist.      EQUIPMENT needed:  none  Physical examination at discharge  Visit Vitals  /79 (BP 1 Location: Right arm, BP Patient Position: At rest)   Pulse 94   Temp 97 °F (36.1 °C)   Resp 19   Ht 6' 2.02\" (1.88 m)   Wt 62.3 kg (137 lb 5.6 oz)   SpO2 94%   BMI 17.63 kg/m²     Awake  Contracted  Lung Decreased air entry Rt base  CVS: RRR  Abd; soft NT ND  Ext: no edema     Pertinent imaging studies:      Recent Labs     02/04/20  0456   WBC 4.6   HGB 7.3*   HCT 25.5*   PLT 77*     Recent Labs     02/04/20  0456      K 4.1      CO2 26   BUN 16   CREA 0.45*   GLU 97   CA 8.0*     No results for input(s): SGOT, GPT, AP, TBIL, TP, ALB, GLOB, GGT, AML, LPSE in the last 72 hours. No lab exists for component: AMYP, HLPSE  No results for input(s): INR, PTP, APTT, INREXT in the last 72 hours. No results for input(s): FE, TIBC, PSAT, FERR in the last 72 hours. No results for input(s): PH, PCO2, PO2 in the last 72 hours. No results for input(s): CPK, CKMB in the last 72 hours.     No lab exists for component: TROPONINI  No components found for: [place]    Chronic Diagnoses:    Problem List as of 2/6/2020 Date Reviewed: 12/23/2019          Codes Class Noted - Resolved    * (Principal) Pneumonia ICD-10-CM: J18.9  ICD-9-CM: 457  1/31/2020 - Present        Septic shock ([facility] ICD-10-CM: A41.9, R65.21  ICD-9-CM: 038.9, 785.52, 995.92  1/31/2020 - Present        GI bleed ICD-10-CM: K92.2  ICD-9-CM: 578.9  1/29/2020 - Present        Seizure ([facility] (Chronic) ICD-10-CM: R56.9  ICD-9-CM: 780.39  8/19/2019 - Present        Renal insufficiency ICD-10-CM: N28.9  ICD-9-CM: 593.9  7/29/2019 - Present        Anemia (Chronic) ICD-10-CM: D64.9  ICD-9-CM: 285.9  7/28/2019 - Present        Complicated UTI (urinary tract infection) ICD-10-CM: N39.0  ICD-9-CM: 599.0  7/25/2019 - Present        TBI (traumatic brain injury) ([facility] (Chronic) ICD-10-CM: S06. 9X9A  ICD-9-CM: 854.00  7/25/2019 - Present        RESOLVED: Pneumonia ICD-10-CM: J18.9  ICD-9-CM: 256  12/23/2019 - 1/29/2020        RESOLVED: Thrombocytopenia ([facility] ICD-10-CM: D69.6  ICD-9-CM: 287.5  12/23/2019 - 12/25/2019        RESOLVED: Sepsis ([facility] ICD-10-CM: A41.9  ICD-9-CM: 038.9, 995.91  8/19/2019 - 1/29/2020        RESOLVED: Hypokalemia ICD-10-CM: E87.6  ICD-9-CM: 276.8  7/29/2019 - 7/29/2019        RESOLVED: Septic shock ([facility] ICD-10-CM: A41.9, R65.21  ICD-9-CM: 038.9, 785.52, 995.92  7/27/2019 - 7/30/2019        RESOLVED: Pyelonephritis ICD-10-CM: N12  ICD-9-CM: 590.80  7/25/2019 - 1/29/2020        RESOLVED: Sepsis ([facility] ICD-10-CM: A41.9  ICD-9-CM: 038.9, 995.91  7/25/2019 - 7/27/2019              Time spent on discharge related activities today greater than 30 minutes.       Signed:  Segundo Mendez MD                 Hospitalist, Internal Medicine      Cc: Shan Phoenix, MD

## 2020-02-06 NOTE — PROGRESS NOTES
Bedside shift change report given to [address] [facility] [address] (oncoming nurse) by Tab Davidson (offgoing nurse). Report included the following information SBAR, Kardex, Intake/Output, MAR and Accordion.

## 2020-02-06 NOTE — PROGRESS NOTES
Discharge order noted. AVS prepared for eventual discharge. Patient's father to come for patient around 2pm. Patient prepared.

## 2020-02-06 NOTE — PROGRESS NOTES
[facility]     Name: Wild Hernandes MRN: [id number]   : 1985 Hospital: [address]   Date: 2020        Impression Plan   1. Septic shock, off pressors   2. Fevers  3. Active seizures on admission with a hx of seizure disorder  4. Traumatic brain injury  5. Small right loculated effusion s/p pigtail . Fluid analysis not c/w empyema  6. Dysphagia               · Supplemental O2 as needed to keep sats > 88%. Currently on RA. · Diet downgraded to dysphagia 1 and honey think liquids per Speech. · Abx deescalated to Unasyn per primary team - to continue through  . Can switch to Augmentin at discharge. · Follow cultures - ngtd  · Protonix  · SCDs    Patient is stable from a pulmonary standpoint. We will sign off and arrange for outpatient pulmonary follow up in 1 week with CXR prior to appt. Please call with questions. Radiology  (personally reviewed) Chest CT reviewed: Right-sided subpleural fluid nearly resolved with right pigtail catheter in place. The wall remains thickened at 7 mm. There is associated right lower lobe infiltrate with air bronchograms. Small right hydropneumothorax. No nodule, mass, or airspace disease.  CXR: small rounded loculated air at the right lung base consistent with small pneumothorax is stable       Subjective     Cc: hypotension    31 yo with PMHx of TBI presenting with fevers/seizures. Per pts father pt started having fevers over the weekend. Increasingly more lethargic and overnight had long seizures lasting about 10 min. Pt has had ativan doses to break seizures at this point, but father reports him being lethargic before this. Had some vomiting leading up to this as well. No diarrhea. Pt has issues with chronic constipation. Had a caretaker that was sick 2 weeks ago, otherwise no sick contacts. Last hospitalized  with similar sxs. All cultures neg at that time.      Interval history  Afebrile  Sats 94% on RA  BP stable  No new am labs  MBS: Mild penetration and minimal occasional aspiration with nectar. Minimal/mild residue in the vallecula. ROS: Unable to obtain due to patient condition. Caregiver at the bedside said he did well again this morning with his breakfast/modified diet. No events reported overnight. Past Medical History:   Diagnosis Date    Anemia 7/28/2019    History of vascular access device 01/29/2020    5F DOUBLE LUMEN PICC PLACEC BY ROSA MCGARRY RN R BRACHIAL 38 CM, NO DIFFICULTY    Neurological disorder     traumatic brain injury    Seizures ([facility]     TBI (traumatic brain injury) ([facility]       Past Surgical History:   Procedure Laterality Date    HX CRANIOTOMY        Prior to Admission medications    Medication Sig Start Date End Date Taking? Authorizing Provider   amoxicillin-clavulanate (AUGMENTIN) 875-125 mg per tablet Take 1 Tab by mouth two (2) times a day for 3 days. 2/6/20 2/9/20 Yes Dorothea Garces MD   traZODone (DESYREL) 100 mg tablet Take 100 mg by mouth nightly. Yes Provider, Historical   lacosamide (VIMPAT) 200 mg tab tablet Take 1 Tab by mouth two (2) times a day. Max Daily Amount: 400 mg. 12/26/19  Yes Johnson Castillo MD   diazePAM (VALIUM) 5 mg tablet Take 0.5 Tabs by mouth every eight (8) hours as needed (for excssive spasticity or seizure). Max Daily Amount: 7.5 mg. 12/26/19  Yes Johnson Castillo MD   aspirin-acetaminophen-caffeine [facility] ES) 014-500-36 mg per tablet Take 2 Tabs by mouth every six (6) hours as needed for Pain or Headache. Do not use with ibuprofen   Yes Provider, Historical   cholecalciferol, vitamin D3, (VITAMIN D3) 2,000 unit tab Take 2,000 Units by mouth daily. Yes Provider, Historical   omega 3-DHA-EPA-fish oil 1,000 mg (120 mg-180 mg) capsule Take 1 Cap by mouth every evening. Yes Provider, Historical   Lactobacillus acidophilus (ACIDOPHILUS) cap Take 1 Cap by mouth daily.  Before morning turn   Yes Provider, Historical bromocriptine mesylate (BROMOCRIPTINE PO) Take 5 mg by mouth four (4) times daily. Patient takes at 8am, 2pm, 6pm, 8pm   Yes Provider, Historical   dantrolene (DANTRIUM) 100 mg capsule Take 100 mg by mouth four (4) times daily. Patient takes at 8am, 2pm, 6pm, 8pm   Yes Provider, Historical   senna (SENNA) 8.6 mg tablet Take 2 Tabs by mouth nightly. Yes Provider, Historical   pantoprazole (PROTONIX) 40 mg tablet Take 40 mg by mouth daily. Before morning turn   Yes Provider, Historical   ibuprofen (MOTRIN) 600 mg tablet Take 600 mg by mouth every four (4) hours as needed for Pain. Yes Provider, Historical   raNITIdine (ZANTAC) 150 mg tablet Take 150 mg by mouth two (2) times daily as needed for Indigestion. Yes Provider, Historical   bisacodyl (DULCOLAX) 10 mg suppository Insert 10 mg into rectum daily as needed (constipation). 7/31/19  Yes Kaitlin Acharya MD   docusate sodium 100 mg tab Take 100 mg by mouth nightly.  7/31/19  Yes Kaitlin Acharya MD     Current Facility-Administered Medications   Medication Dose Route Frequency    ampicillin-sulbactam (UNASYN) 3 g in 0.9% sodium chloride (MBP/ADV) 100 mL  3 g IntraVENous Q6H    pantoprazole (PROTONIX) 40 mg in 0.9% sodium chloride 10 mL injection  40 mg IntraVENous DAILY    cholecalciferol (VITAMIN D3) tablet 5,000 Units  5,000 Units Oral DAILY    senna (SENOKOT) tablet 8.6 mg  1 Tab Oral DAILY    docusate sodium (COLACE) capsule 100 mg  100 mg Oral BID    sodium chloride (NS) flush 5-40 mL  5-40 mL IntraVENous Q8H    0.9% sodium chloride infusion  75 mL/hr IntraVENous CONTINUOUS    bromocriptine (PARLODEL) tablet 5 mg  5 mg Oral QID    dantrolene (DANTRIUM) capsule 100 mg  100 mg Oral QID    lacosamide (VIMPAT) tablet 200 mg  200 mg Oral BID    traZODone (DESYREL) tablet 100 mg  100 mg Oral QHS     No Known Allergies   Social History     Tobacco Use    Smoking status: Never Smoker    Smokeless tobacco: Never Used   Substance Use Topics    Alcohol use: No      Family History   Problem Relation Age of Onset    No Known Problems Other         reviewed. Patient does not know          Laboratory: I have personally reviewed the flowsheet and labs. Recent Labs     02/04/20  0456   WBC 4.6   HGB 7.3*   HCT 25.5*   PLT 77*     Recent Labs     02/04/20  0456      K 4.1      CO2 26   GLU 97   BUN 16   CREA 0.45*   CA 8.0*       Objective:     Visit Vitals  /79 (BP 1 Location: Right arm, BP Patient Position: At rest)   Pulse 94   Temp 97 °F (36.1 °C)   Resp 19   Ht 6' 2.02\" (1.88 m)   Wt 62.3 kg (137 lb 5.6 oz)   SpO2 94%   BMI 17.63 kg/m²         Intake/Output Summary (Last 24 hours) at 2/6/2020 1119  Last data filed at 2/6/2020 0620  Gross per 24 hour   Intake    Output 600 ml   Net -600 ml     EXAM:   GENERAL: awake, interactive, chronic contractures,  HEENT:  anicteric, EOMI, no alar flaring or epistaxis, oral mucosa moist without cyanosis, NECK:  no jugular vein distention, no retractions, no thyromegaly or masses, LUNGS: anterior/lateral exam CTA, no w/r/r, HEART:  Regular rate and rhythm with no MGR; no edema is present, ABDOMEN:  soft with no tenderness, bowel sounds present, EXTREMITIES:  warm with no cyanosis, SKIN:  no jaundice or ecchymosis and NEUROLOGIC:  Moving upper extremities.     Luisa Pratt, [place]  [facility] [place]

## 2020-02-06 NOTE — DISCHARGE INSTRUCTIONS
Please bring this form with you to show your primary care provider at your follow-up appointment. Primary care provider:  Dr. Alla Ornelas MD    Discharging provider:  Mariia Oliveros MD    You have been admitted to the hospital with the following diagnoses:    GI bleed [K92.2]   Pneumonia    FOLLOW-UP CARE RECOMMENDATIONS:    APPOINTMENTS:  Follow-up Information     Follow up With Specialties Details Why Contact Info    Alla Ornelas MD Internal Medicine In 1 week Discharge follow up  [address] [address] [phone]              FOLLOW-UP TESTS recommended: NONE    PENDING TEST RESULTS:  At the time of your discharge the following test results are still pending: NONE  Please make sure you review these results with your outpatient follow-up provider(s). SYMPTOMS to watch for: chest pain, shortness of breath, fever, chills, nausea, vomiting, diarrhea, change in mentation, falling, weakness, bleeding. DIET/what to eat:  Pureed 1 Diet with Honey Thickened , Magic cup with all meal, Yogurt for snacks    ACTIVITY:  Resume previous activity     WOUND CARE:   Daily with skin care apply lotion to extremities and bony prominences for protection from friction/shear. Apply Zinc barrier to the gluteal areas daily and as needed with moisture.  Float heels and protect (with heel Boots). Turn Q2h while in bed (with turn team). Protect from lines and devices. When up in chair use a waffle cushion and reposition every 20 minutes.     To the right inner gluteal area apply zinc barrier to the site and cover with foam border dressing. Change dressing every 2 days and if moist.     EQUIPMENT needed:  none      What to do if new or unexpected symptoms occur? If you experience any of the above symptoms (or should other concerns or questions arise after discharge) please call your primary care physician. Return to the emergency room if you cannot get hold of your doctor.     · It is very important that you keep your follow-up appointment(s). · Please bring discharge papers, medication list (and/or medication bottles) to your follow-up appointments for review by your outpatient provider(s). · Please check the list of medications and be sure it includes every medication (even non-prescription medications) that your provider wants you to take. · It is important that you take the medication exactly as they are prescribed. · Keep your medication in the bottles provided by the pharmacist and keep a list of the medication names, dosages, and times to be taken in your wallet. · Do not take other medications without consulting your doctor. · If you have any questions about your medications or other instructions, please talk to your nurse or care provider before you leave the hospital.    I understand that if any problems occur once I am at home I am to contact my physician. These instructions were explained to me and I had the opportunity to ask questions.

## 2020-02-06 NOTE — CDMP QUERY
Good Afternoon, Pt admitted with fever/Seizure on 1/29. Pt noted to have SIRS documented within the medical record since there was an unknown infectious source. However, by 1/30 there's documentation of Septic Shock. If possible, please clarify the conflicting documentation in the medical record within the progress notes and d/c summary if you are evaluating and / or treating any of the following: 
 
==>Sepsis With Septic Shock 2/2 confirmed Aspiration Pneumonia POA 
==>No Sepsis, Aspiration Pneumonia with Traumatic Shock 2/2 SIRS POA 
==>Other, please specify 
==>Clinically unable to determine The medical record reflects the following: 
    
Risk Factors: 30 yr M admitted with Fever/seizure with HST TBI/non-verbal 
    
Clinical Indicators: Patient arrived to ED on 1/29 with c/o fever/seizure. Patient's vital signs on admission were Temp 102.2  BP 98/68 RR 28 with a WBC 2.9 and lactic 1.2. BP continued to drop down to 82/52 in which a central line was placed and blood pressure support was initiated. BC were drawn. All radiology studies were negative for infection source at the time and SIRS was placed within the medical record. On 1/30 the continued need for blood pressure management the documentation of Septic shock with unknown infection source within Dr. Sarika Styles progress note on 1/30. CT of the chest on 1/31 showed possible RLL empyema with beginning stages of necrosis. IR placed a pigtail drain and fluid analysis of the fluid determined that it wasn't an empyema and suspected aspiration pneumonia. MBS was performed and revealed silent aspiration and dysphagia present.   
 
Treatment: ICU level of care, Pulmonary consult, GI consult, SLP, CBC/BMP, radiology imaging, IR with pigtail drain placement, Pleural fluid analysis, MBS, Titration of Levophed gtt for BP support, NS IVF at 100 ml/hr, Unasyn 3 G IV Q 6 hrs, 2,728 ML NS IVF bolus on 1/29 in ED, vancomycin 1500 mg IV Once 1/29 in ED, Cefepime 2 G IV Q 8 hrs, Levaquin 750 mg IV Q 24 hrs and Flagyl 500 mg IV Q 12 hrs. Thank you, Katharina Durbin [address] [place] [address]. Mitzi

## 2020-02-06 NOTE — PALLIATIVE CARE DISCHARGE
Goals of Care/Treatment Preferences The Palliative Medicine team was consulted as part of your/your loved one's care in the hospital. Our team is a supportive service; we strive to relieve suffering and improve quality of life. We reviewed advance care planning information, which includes the following: 
Patient's [place] is[de-identified] [address] Confirm Advance Directive: None Patient Would Like to Complete Advance Directive: Unable Does the patient have other document types: Other (Guardianship) Patient/Health Care Proxy Stated Goals: Prolong life We reviewed / discussed your code status as: Full Code Full Code means perform CPR in the event of cardiac arrest. 
    DNR means do NOT perform CPR in the event of cardiac arrest. 
    Partial Code means you have specific preferences, please discuss with your healthcare team. 
    [place] means this issue was not addressed / resolved during your stay Medical Interventions: Full interventions Because of the importance of this information, we are providing you with a printed copy to share with other healthcare providers after this hospitalization is complete.

## 2020-02-06 NOTE — PROGRESS NOTES
Follow up visit with Mr. Carmelo Velez, on 5 Med surg. Lynne Byrd was alone, he made good eye contact. He appeared to be communicating with me though not clear al all on what he was communicating. He appeared to nod yes to belief in God and yes to prayer. Provided prayer, his care giver came into the room during the prayer. She shared and AMEN at the end of prayer and shared Lynne Byrd will be going ome soon. Provided spiritual presence and prayer.     Visited by: Flaco Hernandes., MS., [address] [facility] [place] ([id number])

## 2020-02-06 NOTE — PROGRESS NOTES
2/6/2020   CARE MANAGEMENT NOTE:  Pt transferred from [facility] to the 5th floor. EMR reviewed. Pt was admitted with GIB. Also with TBI sustained in [place]; total assistance and he is non-verbal.  Reportedly, pt resides with his parents: Mother Radha Guardian ([phone]) and Tari Tamayo ([phone]). Transition Plan of Care:  1. Plan is to return home with his parents along with 24/7 caregivers. 2.  Outpt f/u.  3.  Father will transport pt home via their w/c van at 2 p.m. No further needs voiced at this writing.   Tristen

## 2020-02-06 NOTE — PROGRESS NOTES
Bedside and Verbal shift change report given to Taryn Champion 83Dallin rn  (oncoming nurse) by Justyna Garcia  (offgoing nurse). Report included the following information SBAR and Kardex.

## 2020-03-02 LAB
BACTERIA SPEC CULT: NORMAL
SERVICE CMNT-IMP: NORMAL

## 2020-03-16 LAB
ACID FAST STN SPEC: NEGATIVE
MYCOBACTERIUM SPEC QL CULT: NEGATIVE
SPECIMEN PREPARATION: NORMAL
SPECIMEN SOURCE: NORMAL

## 2024-06-20 NOTE — PROGRESS NOTES
Bedside and Verbal shift change report given to Dee Boast, RN (oncoming nurse) by Phuc Varma RN (offgoing nurse). Report included the following information SBAR, Kardex, Intake/Output, MAR, Accordion, Recent Results and Med Rec Status. Pt called again asking if the paperwork has been completed. I s/w the office and they said Dr Farooq will not be in the office until Monday.   Pt stated he is about to lose his job and asking if there is any way Dr Farooq can fill out the papertowk sooner?  Please call